# Patient Record
Sex: FEMALE | Race: BLACK OR AFRICAN AMERICAN | NOT HISPANIC OR LATINO | Employment: UNEMPLOYED | URBAN - METROPOLITAN AREA
[De-identification: names, ages, dates, MRNs, and addresses within clinical notes are randomized per-mention and may not be internally consistent; named-entity substitution may affect disease eponyms.]

---

## 2017-09-13 ENCOUNTER — HOSPITAL ENCOUNTER (OUTPATIENT)
Dept: RADIOLOGY | Facility: HOSPITAL | Age: 67
Discharge: HOME | End: 2017-09-13
Attending: PAIN MEDICINE
Payer: MEDICARE

## 2017-09-13 DIAGNOSIS — M17.9 OSTEOARTHRITIS, KNEE: ICD-10-CM

## 2017-09-13 DIAGNOSIS — M17.9 OSTEOARTHRITIS, KNEE: Primary | ICD-10-CM

## 2017-09-13 PROCEDURE — 73565 X-RAY EXAM OF KNEES: CPT | Mod: TC

## 2017-09-13 PROCEDURE — 73565 X-RAY EXAM OF KNEES: CPT | Mod: 26,,, | Performed by: RADIOLOGY

## 2017-09-20 DIAGNOSIS — M25.561 KNEE PAIN, RIGHT: Primary | ICD-10-CM

## 2017-09-26 ENCOUNTER — HOSPITAL ENCOUNTER (OUTPATIENT)
Dept: RADIOLOGY | Facility: HOSPITAL | Age: 67
Discharge: HOME | End: 2017-09-26
Attending: PAIN MEDICINE
Payer: MEDICARE

## 2017-09-26 DIAGNOSIS — M25.561 KNEE PAIN, RIGHT: ICD-10-CM

## 2017-09-26 PROCEDURE — 73721 MRI JNT OF LWR EXTRE W/O DYE: CPT | Mod: TC,RT

## 2017-09-26 PROCEDURE — 73721 MRI JNT OF LWR EXTRE W/O DYE: CPT | Mod: 26,RT,, | Performed by: RADIOLOGY

## 2018-03-01 ENCOUNTER — HOSPITAL ENCOUNTER (OUTPATIENT)
Dept: RADIOLOGY | Facility: HOSPITAL | Age: 68
Discharge: HOME | End: 2018-03-01
Attending: ORTHOPAEDIC SURGERY
Payer: MEDICARE

## 2018-03-01 ENCOUNTER — HOSPITAL ENCOUNTER (OUTPATIENT)
Dept: PREADMISSION TESTING | Facility: HOSPITAL | Age: 68
Discharge: HOME | End: 2018-03-01
Attending: ORTHOPAEDIC SURGERY
Payer: MEDICARE

## 2018-03-01 VITALS
BODY MASS INDEX: 38.15 KG/M2 | WEIGHT: 229 LBS | DIASTOLIC BLOOD PRESSURE: 73 MMHG | HEART RATE: 70 BPM | OXYGEN SATURATION: 96 % | SYSTOLIC BLOOD PRESSURE: 136 MMHG | HEIGHT: 65 IN | TEMPERATURE: 97 F | RESPIRATION RATE: 18 BRPM

## 2018-03-01 DIAGNOSIS — Z01.818 PRE-OP TESTING: Primary | ICD-10-CM

## 2018-03-01 LAB
ALBUMIN SERPL BCP-MCNC: 3.9 G/DL
ALP SERPL-CCNC: 80 U/L
ALT SERPL W/O P-5'-P-CCNC: 20 U/L
ANION GAP SERPL CALC-SCNC: 8 MMOL/L
APTT BLDCRRT: 29.9 SEC
AST SERPL-CCNC: 16 U/L
BASOPHILS # BLD AUTO: 0.02 K/UL
BASOPHILS NFR BLD: 0.3 %
BILIRUB SERPL-MCNC: 0.4 MG/DL
BILIRUB UR QL STRIP: NEGATIVE
BUN SERPL-MCNC: 14 MG/DL
CALCIUM SERPL-MCNC: 9.3 MG/DL
CHLORIDE SERPL-SCNC: 104 MMOL/L
CLARITY UR: CLEAR
CO2 SERPL-SCNC: 25 MMOL/L
COLOR UR: YELLOW
CREAT SERPL-MCNC: 0.7 MG/DL
DIFFERENTIAL METHOD: ABNORMAL
EOSINOPHIL # BLD AUTO: 0.3 K/UL
EOSINOPHIL NFR BLD: 4.7 %
ERYTHROCYTE [DISTWIDTH] IN BLOOD BY AUTOMATED COUNT: 15 %
EST. GFR  (AFRICAN AMERICAN): >60 ML/MIN/1.73 M^2
EST. GFR  (NON AFRICAN AMERICAN): >60 ML/MIN/1.73 M^2
ESTIMATED AVG GLUCOSE: 111 MG/DL
GLUCOSE SERPL-MCNC: 109 MG/DL
GLUCOSE UR QL STRIP: NEGATIVE
HBA1C MFR BLD HPLC: 5.5 %
HCT VFR BLD AUTO: 36.7 %
HGB BLD-MCNC: 12.5 G/DL
HGB UR QL STRIP: ABNORMAL
INR PPP: 1
KETONES UR QL STRIP: NEGATIVE
LEUKOCYTE ESTERASE UR QL STRIP: NEGATIVE
LYMPHOCYTES # BLD AUTO: 2.7 K/UL
LYMPHOCYTES NFR BLD: 37.1 %
MCH RBC QN AUTO: 28.6 PG
MCHC RBC AUTO-ENTMCNC: 34.1 G/DL
MCV RBC AUTO: 84 FL
MICROSCOPIC COMMENT: NORMAL
MONOCYTES # BLD AUTO: 0.5 K/UL
MONOCYTES NFR BLD: 6.7 %
NEUTROPHILS # BLD AUTO: 3.7 K/UL
NEUTROPHILS NFR BLD: 51.1 %
NITRITE UR QL STRIP: NEGATIVE
PH UR STRIP: 5 [PH] (ref 5–8)
PLATELET # BLD AUTO: 248 K/UL
PMV BLD AUTO: 11.1 FL
POTASSIUM SERPL-SCNC: 4.1 MMOL/L
PROT SERPL-MCNC: 7.7 G/DL
PROT UR QL STRIP: NEGATIVE
PROTHROMBIN TIME: 10.6 SEC
RBC # BLD AUTO: 4.37 M/UL
RBC #/AREA URNS HPF: 0 /HPF (ref 0–4)
SODIUM SERPL-SCNC: 137 MMOL/L
SP GR UR STRIP: 1.01 (ref 1–1.03)
SQUAMOUS #/AREA URNS HPF: NORMAL /HPF
URN SPEC COLLECT METH UR: ABNORMAL
UROBILINOGEN UR STRIP-ACNC: NEGATIVE EU/DL
WBC # BLD AUTO: 7.2 K/UL

## 2018-03-01 PROCEDURE — 93010 ELECTROCARDIOGRAM REPORT: CPT | Mod: ,,, | Performed by: INTERNAL MEDICINE

## 2018-03-01 PROCEDURE — 83036 HEMOGLOBIN GLYCOSYLATED A1C: CPT

## 2018-03-01 PROCEDURE — 93005 ELECTROCARDIOGRAM TRACING: CPT

## 2018-03-01 PROCEDURE — 81000 URINALYSIS NONAUTO W/SCOPE: CPT

## 2018-03-01 PROCEDURE — 85025 COMPLETE CBC W/AUTO DIFF WBC: CPT

## 2018-03-01 PROCEDURE — 80053 COMPREHEN METABOLIC PANEL: CPT

## 2018-03-01 PROCEDURE — 85730 THROMBOPLASTIN TIME PARTIAL: CPT

## 2018-03-01 PROCEDURE — 71046 X-RAY EXAM CHEST 2 VIEWS: CPT | Mod: 26,,, | Performed by: RADIOLOGY

## 2018-03-01 PROCEDURE — 85610 PROTHROMBIN TIME: CPT

## 2018-03-01 PROCEDURE — 36415 COLL VENOUS BLD VENIPUNCTURE: CPT

## 2018-03-01 PROCEDURE — 71046 X-RAY EXAM CHEST 2 VIEWS: CPT | Mod: TC,FY

## 2018-03-01 RX ORDER — ALBUTEROL SULFATE 90 UG/1
2 AEROSOL, METERED RESPIRATORY (INHALATION) EVERY 6 HOURS PRN
COMMUNITY

## 2018-03-01 RX ORDER — LEVOTHYROXINE SODIUM 50 UG/1
50 TABLET ORAL DAILY
COMMUNITY

## 2018-03-01 RX ORDER — ASPIRIN 81 MG/1
81 TABLET ORAL DAILY
Status: ON HOLD | COMMUNITY
End: 2018-03-14 | Stop reason: HOSPADM

## 2018-03-01 RX ORDER — MELOXICAM 15 MG/1
15 TABLET ORAL DAILY
Status: ON HOLD | COMMUNITY
End: 2018-03-14 | Stop reason: HOSPADM

## 2018-03-01 RX ORDER — FAMOTIDINE 40 MG/1
40 TABLET, FILM COATED ORAL NIGHTLY
COMMUNITY

## 2018-03-01 RX ORDER — OXYBUTYNIN CHLORIDE 10 MG/1
10 TABLET, EXTENDED RELEASE ORAL DAILY
COMMUNITY

## 2018-03-01 RX ORDER — QUETIAPINE FUMARATE 300 MG/1
300 TABLET, FILM COATED ORAL NIGHTLY
Status: ON HOLD | COMMUNITY
End: 2023-01-05 | Stop reason: HOSPADM

## 2018-03-01 RX ORDER — AMLODIPINE, VALSARTAN AND HYDROCHLOROTHIAZIDE 10; 160; 25 MG/1; MG/1; MG/1
1 TABLET ORAL DAILY
Status: ON HOLD | COMMUNITY
End: 2023-01-05 | Stop reason: HOSPADM

## 2018-03-01 RX ORDER — CARVEDILOL 12.5 MG/1
12.5 TABLET ORAL 2 TIMES DAILY WITH MEALS
Status: ON HOLD | COMMUNITY
End: 2023-01-05 | Stop reason: HOSPADM

## 2018-03-01 RX ORDER — METOCLOPRAMIDE 10 MG/1
10 TABLET ORAL
COMMUNITY

## 2018-03-01 RX ORDER — FLUOXETINE HYDROCHLORIDE 20 MG/1
20 CAPSULE ORAL DAILY
COMMUNITY

## 2018-03-01 RX ORDER — ATORVASTATIN CALCIUM 10 MG/1
10 TABLET, FILM COATED ORAL DAILY
Status: ON HOLD | COMMUNITY
End: 2023-01-05 | Stop reason: HOSPADM

## 2018-03-01 NOTE — DISCHARGE INSTRUCTIONS
Your surgery is scheduled for___3/12/2018______________.    Call [phone] between 2 pm and 5 pm __3/9/2018__________ to find out your arrival time for the day of surgery.    Report to SAME DAY SURGERY UNIT at _______am on the 2nd floor of the hospital.  Use the front entrance of the hospital before 6 am.  If you need wheelchair assistance, call [phone] from your cell phone,  or call 0 from the courtesy phone in the hospital lobby.    Important instructions:   Do not eat or drink after 12 midnight, including water.  It is okay to brush your teeth.  Do not have gum, candy or mints.     Take only these medications with a small swallow of water on the morning of your surgery.__inhalers, carvedilol, prozac, levothyroxine___________    Do not take any diabetic medication on the morning of surgery unless instructed to do so by your doctor or pre op nurse.    Stop taking Aspirin, Ibuprofen, Motrin and Aleve , Fish oil, and Vitamin E for at least 7 days before your surgery. You may use Tylenol unless otherwise instructed by your doctor.          Return to the hospital lab on __3/10/2018 or 3/11/2018________for additional blood test.         Please shower the night before and the morning of your surgery.        Use Hibiclens soap to your surgery site if instructed by your pre op nurse.   If your surgery is on your abdomen, be sure to wash your naval.  Be sure to rinse off Hibiclens after it is on your skin for several minutes.  Do not use Hibiclens on your face or genitals. Please place clean linens on your bed the night before surgery. Please wear fresh clean clothing after each shower.     No shaving of procedural area at least 4-5 days before surgery due to increased risk of skin irritation and/or possible infection.      You may be asked to take a third shower on arrival to Same Day Surgery depending on the type of surgery you are having.     Female patients may be asked for a urine specimen on the morning of  the surgery.  Please check with your nurse before using the restroom.     Do not wear make- up, including mascara.     You may wear deodorant only.      Do not wear powder, body lotion or cologne.     Do not wear any jewelry or have any metal on your body.     Please bring any documents given to you by your doctor.     If you are going home on the same day of surgery, you must have arrangements for a ride home.  You will not be able to drive home if you were given anesthesia or sedation.     Wear loose fitting clothes allowing for bandages.     Please leave money and valuables home.       You may bring your cell phone.     Call the doctor if fever or illness should occur before your surgery.    Call [phone] to contact us here at [facility] if needed.

## 2018-03-12 ENCOUNTER — HOSPITAL ENCOUNTER (INPATIENT)
Facility: HOSPITAL | Age: 68
LOS: 2 days | Discharge: HOME HEALTH | DRG: 470 | End: 2018-03-14
Attending: ORTHOPAEDIC SURGERY | Admitting: ORTHOPAEDIC SURGERY
Payer: MEDICARE

## 2018-03-12 ENCOUNTER — ANESTHESIA EVENT (OUTPATIENT)
Dept: SURGERY | Facility: HOSPITAL | Age: 68
DRG: 470 | End: 2018-03-12
Payer: MEDICARE

## 2018-03-12 ENCOUNTER — ANESTHESIA (OUTPATIENT)
Dept: SURGERY | Facility: HOSPITAL | Age: 68
DRG: 470 | End: 2018-03-12
Payer: MEDICARE

## 2018-03-12 DIAGNOSIS — M17.11 PRIMARY OSTEOARTHRITIS OF RIGHT KNEE: Primary | ICD-10-CM

## 2018-03-12 PROBLEM — J44.1 COPD EXACERBATION: Status: ACTIVE | Noted: 2018-03-12

## 2018-03-12 LAB
BACTERIA #/AREA URNS HPF: ABNORMAL /HPF
BASOPHILS # BLD AUTO: 0.02 K/UL
BASOPHILS NFR BLD: 0.2 %
BILIRUB UR QL STRIP: NEGATIVE
CLARITY UR: CLEAR
COLOR UR: YELLOW
DIFFERENTIAL METHOD: ABNORMAL
EOSINOPHIL # BLD AUTO: 0.2 K/UL
EOSINOPHIL NFR BLD: 1.5 %
ERYTHROCYTE [DISTWIDTH] IN BLOOD BY AUTOMATED COUNT: 14.9 %
GLUCOSE UR QL STRIP: NEGATIVE
GRAN CASTS #/AREA URNS LPF: 2 /LPF
HCT VFR BLD AUTO: 34.9 %
HGB BLD-MCNC: 11.9 G/DL
HGB UR QL STRIP: NEGATIVE
HYALINE CASTS #/AREA URNS LPF: 1 /LPF
KETONES UR QL STRIP: NEGATIVE
LEUKOCYTE ESTERASE UR QL STRIP: NEGATIVE
LYMPHOCYTES # BLD AUTO: 2.9 K/UL
LYMPHOCYTES NFR BLD: 28.7 %
MCH RBC QN AUTO: 29.4 PG
MCHC RBC AUTO-ENTMCNC: 34.1 G/DL
MCV RBC AUTO: 86 FL
MICROSCOPIC COMMENT: ABNORMAL
MONOCYTES # BLD AUTO: 1 K/UL
MONOCYTES NFR BLD: 9.7 %
NEUTROPHILS # BLD AUTO: 6 K/UL
NEUTROPHILS NFR BLD: 59.3 %
NITRITE UR QL STRIP: NEGATIVE
PH UR STRIP: 5 [PH] (ref 5–8)
PLATELET # BLD AUTO: 211 K/UL
PMV BLD AUTO: 11 FL
POCT GLUCOSE: 122 MG/DL (ref 70–110)
POCT GLUCOSE: 123 MG/DL (ref 70–110)
POCT GLUCOSE: 132 MG/DL (ref 70–110)
POCT GLUCOSE: 139 MG/DL (ref 70–110)
PROT UR QL STRIP: ABNORMAL
RBC # BLD AUTO: 4.05 M/UL
RBC #/AREA URNS HPF: 2 /HPF (ref 0–4)
SP GR UR STRIP: 1.03 (ref 1–1.03)
SQUAMOUS #/AREA URNS HPF: 2 /HPF
URN SPEC COLLECT METH UR: ABNORMAL
UROBILINOGEN UR STRIP-ACNC: NEGATIVE EU/DL
WBC # BLD AUTO: 10.16 K/UL
WBC #/AREA URNS HPF: 6 /HPF (ref 0–5)

## 2018-03-12 PROCEDURE — 36415 COLL VENOUS BLD VENIPUNCTURE: CPT

## 2018-03-12 PROCEDURE — 25000003 PHARM REV CODE 250: Performed by: NURSE ANESTHETIST, CERTIFIED REGISTERED

## 2018-03-12 PROCEDURE — 71000039 HC RECOVERY, EACH ADD'L HOUR: Performed by: ORTHOPAEDIC SURGERY

## 2018-03-12 PROCEDURE — 11000001 HC ACUTE MED/SURG PRIVATE ROOM

## 2018-03-12 PROCEDURE — 25000003 PHARM REV CODE 250: Performed by: ORTHOPAEDIC SURGERY

## 2018-03-12 PROCEDURE — 37000008 HC ANESTHESIA 1ST 15 MINUTES: Performed by: ORTHOPAEDIC SURGERY

## 2018-03-12 PROCEDURE — C1776 JOINT DEVICE (IMPLANTABLE): HCPCS | Performed by: ORTHOPAEDIC SURGERY

## 2018-03-12 PROCEDURE — 81000 URINALYSIS NONAUTO W/SCOPE: CPT

## 2018-03-12 PROCEDURE — 36000710: Performed by: ORTHOPAEDIC SURGERY

## 2018-03-12 PROCEDURE — S0020 INJECTION, BUPIVICAINE HYDRO: HCPCS | Performed by: ORTHOPAEDIC SURGERY

## 2018-03-12 PROCEDURE — 63600175 PHARM REV CODE 636 W HCPCS: Performed by: ORTHOPAEDIC SURGERY

## 2018-03-12 PROCEDURE — C9290 INJ, BUPIVACAINE LIPOSOME: HCPCS | Performed by: ORTHOPAEDIC SURGERY

## 2018-03-12 PROCEDURE — 87086 URINE CULTURE/COLONY COUNT: CPT

## 2018-03-12 PROCEDURE — 27201423 OPTIME MED/SURG SUP & DEVICES STERILE SUPPLY: Performed by: ORTHOPAEDIC SURGERY

## 2018-03-12 PROCEDURE — 25000003 PHARM REV CODE 250: Performed by: ANESTHESIOLOGY

## 2018-03-12 PROCEDURE — 27200688 HC TRAY, SPINAL-HYPER/ ISOBARIC: Performed by: NURSE ANESTHETIST, CERTIFIED REGISTERED

## 2018-03-12 PROCEDURE — C1713 ANCHOR/SCREW BN/BN,TIS/BN: HCPCS | Performed by: ORTHOPAEDIC SURGERY

## 2018-03-12 PROCEDURE — 36000711: Performed by: ORTHOPAEDIC SURGERY

## 2018-03-12 PROCEDURE — 0SRC0J9 REPLACEMENT OF RIGHT KNEE JOINT WITH SYNTHETIC SUBSTITUTE, CEMENTED, OPEN APPROACH: ICD-10-PCS | Performed by: ORTHOPAEDIC SURGERY

## 2018-03-12 PROCEDURE — D9220A PRA ANESTHESIA: Mod: CRNA,,, | Performed by: NURSE ANESTHETIST, CERTIFIED REGISTERED

## 2018-03-12 PROCEDURE — 85025 COMPLETE CBC W/AUTO DIFF WBC: CPT

## 2018-03-12 PROCEDURE — D9220A PRA ANESTHESIA: Mod: ANES,,, | Performed by: ANESTHESIOLOGY

## 2018-03-12 PROCEDURE — 63600175 PHARM REV CODE 636 W HCPCS: Performed by: NURSE ANESTHETIST, CERTIFIED REGISTERED

## 2018-03-12 PROCEDURE — G8978 MOBILITY CURRENT STATUS: HCPCS | Mod: CL

## 2018-03-12 PROCEDURE — G8979 MOBILITY GOAL STATUS: HCPCS | Mod: CJ

## 2018-03-12 PROCEDURE — 71000033 HC RECOVERY, INTIAL HOUR: Performed by: ORTHOPAEDIC SURGERY

## 2018-03-12 PROCEDURE — 97161 PT EVAL LOW COMPLEX 20 MIN: CPT

## 2018-03-12 PROCEDURE — 97530 THERAPEUTIC ACTIVITIES: CPT

## 2018-03-12 PROCEDURE — 37000009 HC ANESTHESIA EA ADD 15 MINS: Performed by: ORTHOPAEDIC SURGERY

## 2018-03-12 DEVICE — PATELLA OVAL DOME 32MM: Type: IMPLANTABLE DEVICE | Site: KNEE | Status: FUNCTIONAL

## 2018-03-12 DEVICE — TRAY TIBIAL CEMENT SZ 3 COBAL: Type: IMPLANTABLE DEVICE | Site: KNEE | Status: FUNCTIONAL

## 2018-03-12 DEVICE — COMPONENT FEM POST SZ 3 RIGHT: Type: IMPLANTABLE DEVICE | Site: KNEE | Status: FUNCTIONAL

## 2018-03-12 DEVICE — CEMENT BONE SMARTSET HV 40 GR.: Type: IMPLANTABLE DEVICE | Site: KNEE | Status: FUNCTIONAL

## 2018-03-12 DEVICE — IMPLANTABLE DEVICE: Type: IMPLANTABLE DEVICE | Site: KNEE | Status: FUNCTIONAL

## 2018-03-12 RX ORDER — MUPIROCIN 20 MG/G
1 OINTMENT TOPICAL 2 TIMES DAILY
Status: DISCONTINUED | OUTPATIENT
Start: 2018-03-12 | End: 2018-03-14 | Stop reason: HOSPADM

## 2018-03-12 RX ORDER — OXYBUTYNIN CHLORIDE 5 MG/1
10 TABLET, EXTENDED RELEASE ORAL DAILY
Status: DISCONTINUED | OUTPATIENT
Start: 2018-03-12 | End: 2018-03-14 | Stop reason: HOSPADM

## 2018-03-12 RX ORDER — DOCUSATE SODIUM 100 MG/1
100 CAPSULE, LIQUID FILLED ORAL EVERY 12 HOURS
Status: DISCONTINUED | OUTPATIENT
Start: 2018-03-12 | End: 2018-03-14 | Stop reason: HOSPADM

## 2018-03-12 RX ORDER — METOCLOPRAMIDE 10 MG/1
10 TABLET ORAL
Status: DISCONTINUED | OUTPATIENT
Start: 2018-03-12 | End: 2018-03-14 | Stop reason: HOSPADM

## 2018-03-12 RX ORDER — FENTANYL CITRATE 50 UG/ML
INJECTION, SOLUTION INTRAMUSCULAR; INTRAVENOUS
Status: DISCONTINUED | OUTPATIENT
Start: 2018-03-12 | End: 2018-03-12

## 2018-03-12 RX ORDER — LEVOTHYROXINE SODIUM 50 UG/1
50 TABLET ORAL DAILY
Status: DISCONTINUED | OUTPATIENT
Start: 2018-03-12 | End: 2018-03-14 | Stop reason: HOSPADM

## 2018-03-12 RX ORDER — FLUOXETINE HYDROCHLORIDE 20 MG/1
20 CAPSULE ORAL DAILY
Status: DISCONTINUED | OUTPATIENT
Start: 2018-03-12 | End: 2018-03-14 | Stop reason: HOSPADM

## 2018-03-12 RX ORDER — HYDROCHLOROTHIAZIDE 25 MG/1
25 TABLET ORAL DAILY
Status: DISCONTINUED | OUTPATIENT
Start: 2018-03-12 | End: 2018-03-14 | Stop reason: HOSPADM

## 2018-03-12 RX ORDER — QUETIAPINE FUMARATE 100 MG/1
300 TABLET, FILM COATED ORAL NIGHTLY
Status: DISCONTINUED | OUTPATIENT
Start: 2018-03-12 | End: 2018-03-14 | Stop reason: HOSPADM

## 2018-03-12 RX ORDER — MORPHINE SULFATE 4 MG/ML
2 INJECTION, SOLUTION INTRAMUSCULAR; INTRAVENOUS
Status: DISCONTINUED | OUTPATIENT
Start: 2018-03-12 | End: 2018-03-14 | Stop reason: HOSPADM

## 2018-03-12 RX ORDER — ASPIRIN 81 MG/1
81 TABLET ORAL 2 TIMES DAILY
Status: DISCONTINUED | OUTPATIENT
Start: 2018-03-12 | End: 2018-03-14 | Stop reason: HOSPADM

## 2018-03-12 RX ORDER — CEFUROXIME SODIUM 750 MG/1
INJECTION, POWDER, FOR SOLUTION INTRAMUSCULAR; INTRAVENOUS
Status: DISCONTINUED | OUTPATIENT
Start: 2018-03-12 | End: 2018-03-12 | Stop reason: HOSPADM

## 2018-03-12 RX ORDER — SODIUM CHLORIDE 0.9 % (FLUSH) 0.9 %
5 SYRINGE (ML) INJECTION
Status: DISCONTINUED | OUTPATIENT
Start: 2018-03-12 | End: 2018-03-14 | Stop reason: HOSPADM

## 2018-03-12 RX ORDER — CARVEDILOL 6.25 MG/1
12.5 TABLET ORAL ONCE
Status: COMPLETED | OUTPATIENT
Start: 2018-03-12 | End: 2018-03-12

## 2018-03-12 RX ORDER — FAMOTIDINE 20 MG/1
40 TABLET, FILM COATED ORAL NIGHTLY
Status: DISCONTINUED | OUTPATIENT
Start: 2018-03-12 | End: 2018-03-14 | Stop reason: HOSPADM

## 2018-03-12 RX ORDER — HYDROMORPHONE HYDROCHLORIDE 2 MG/ML
0.2 INJECTION, SOLUTION INTRAMUSCULAR; INTRAVENOUS; SUBCUTANEOUS EVERY 5 MIN PRN
Status: DISCONTINUED | OUTPATIENT
Start: 2018-03-12 | End: 2018-03-12 | Stop reason: HOSPADM

## 2018-03-12 RX ORDER — ACETAMINOPHEN 10 MG/ML
1000 INJECTION, SOLUTION INTRAVENOUS EVERY 8 HOURS
Status: DISCONTINUED | OUTPATIENT
Start: 2018-03-12 | End: 2018-03-12

## 2018-03-12 RX ORDER — AMLODIPINE, VALSARTAN AND HYDROCHLOROTHIAZIDE 10; 160; 25 MG/1; MG/1; MG/1
1 TABLET ORAL DAILY
Status: DISCONTINUED | OUTPATIENT
Start: 2018-03-12 | End: 2018-03-12

## 2018-03-12 RX ORDER — BUPIVACAINE HYDROCHLORIDE 2.5 MG/ML
INJECTION, SOLUTION INFILTRATION; PERINEURAL
Status: DISCONTINUED | OUTPATIENT
Start: 2018-03-12 | End: 2018-03-12 | Stop reason: HOSPADM

## 2018-03-12 RX ORDER — CEFAZOLIN SODIUM 1 G/3ML
INJECTION, POWDER, FOR SOLUTION INTRAMUSCULAR; INTRAVENOUS
Status: DISCONTINUED | OUTPATIENT
Start: 2018-03-12 | End: 2018-03-12

## 2018-03-12 RX ORDER — METOCLOPRAMIDE HYDROCHLORIDE 5 MG/ML
INJECTION INTRAMUSCULAR; INTRAVENOUS
Status: DISCONTINUED | OUTPATIENT
Start: 2018-03-12 | End: 2018-03-12

## 2018-03-12 RX ORDER — MIDAZOLAM HYDROCHLORIDE 1 MG/ML
INJECTION, SOLUTION INTRAMUSCULAR; INTRAVENOUS
Status: DISCONTINUED | OUTPATIENT
Start: 2018-03-12 | End: 2018-03-12

## 2018-03-12 RX ORDER — AMLODIPINE BESYLATE 5 MG/1
10 TABLET ORAL DAILY
Status: DISCONTINUED | OUTPATIENT
Start: 2018-03-12 | End: 2018-03-14 | Stop reason: HOSPADM

## 2018-03-12 RX ORDER — PROPOFOL 10 MG/ML
VIAL (ML) INTRAVENOUS CONTINUOUS PRN
Status: DISCONTINUED | OUTPATIENT
Start: 2018-03-12 | End: 2018-03-12

## 2018-03-12 RX ORDER — METHYLPREDNISOLONE ACETATE 40 MG/ML
INJECTION, SUSPENSION INTRA-ARTICULAR; INTRALESIONAL; INTRAMUSCULAR; SOFT TISSUE
Status: DISCONTINUED | OUTPATIENT
Start: 2018-03-12 | End: 2018-03-12 | Stop reason: HOSPADM

## 2018-03-12 RX ORDER — GENTAMICIN SULFATE 40 MG/ML
INJECTION, SOLUTION INTRAMUSCULAR; INTRAVENOUS
Status: DISCONTINUED | OUTPATIENT
Start: 2018-03-12 | End: 2018-03-12 | Stop reason: HOSPADM

## 2018-03-12 RX ORDER — CEFAZOLIN SODIUM 2 G/50ML
2 SOLUTION INTRAVENOUS
Status: COMPLETED | OUTPATIENT
Start: 2018-03-12 | End: 2018-03-13

## 2018-03-12 RX ORDER — SODIUM CHLORIDE, SODIUM LACTATE, POTASSIUM CHLORIDE, CALCIUM CHLORIDE 600; 310; 30; 20 MG/100ML; MG/100ML; MG/100ML; MG/100ML
INJECTION, SOLUTION INTRAVENOUS CONTINUOUS
Status: DISCONTINUED | OUTPATIENT
Start: 2018-03-12 | End: 2018-03-13

## 2018-03-12 RX ORDER — CARVEDILOL 6.25 MG/1
12.5 TABLET ORAL 2 TIMES DAILY WITH MEALS
Status: DISCONTINUED | OUTPATIENT
Start: 2018-03-12 | End: 2018-03-14 | Stop reason: HOSPADM

## 2018-03-12 RX ORDER — OXYCODONE HYDROCHLORIDE 5 MG/1
10 TABLET ORAL EVERY 4 HOURS PRN
Status: DISCONTINUED | OUTPATIENT
Start: 2018-03-12 | End: 2018-03-14 | Stop reason: HOSPADM

## 2018-03-12 RX ORDER — ATORVASTATIN CALCIUM 10 MG/1
10 TABLET, FILM COATED ORAL DAILY
Status: DISCONTINUED | OUTPATIENT
Start: 2018-03-12 | End: 2018-03-14 | Stop reason: HOSPADM

## 2018-03-12 RX ORDER — TRANEXAMIC ACID 100 MG/ML
2000 INJECTION, SOLUTION INTRAVENOUS ONCE
Status: DISCONTINUED | OUTPATIENT
Start: 2018-03-12 | End: 2018-03-12 | Stop reason: CLARIF

## 2018-03-12 RX ORDER — METOCLOPRAMIDE HYDROCHLORIDE 5 MG/ML
10 INJECTION INTRAMUSCULAR; INTRAVENOUS EVERY 10 MIN PRN
Status: DISCONTINUED | OUTPATIENT
Start: 2018-03-12 | End: 2018-03-12 | Stop reason: HOSPADM

## 2018-03-12 RX ORDER — ONDANSETRON 2 MG/ML
4 INJECTION INTRAMUSCULAR; INTRAVENOUS EVERY 12 HOURS PRN
Status: DISCONTINUED | OUTPATIENT
Start: 2018-03-12 | End: 2018-03-14 | Stop reason: HOSPADM

## 2018-03-12 RX ORDER — SODIUM CHLORIDE, SODIUM LACTATE, POTASSIUM CHLORIDE, CALCIUM CHLORIDE 600; 310; 30; 20 MG/100ML; MG/100ML; MG/100ML; MG/100ML
INJECTION, SOLUTION INTRAVENOUS CONTINUOUS PRN
Status: DISCONTINUED | OUTPATIENT
Start: 2018-03-12 | End: 2018-03-12

## 2018-03-12 RX ORDER — MEPERIDINE HYDROCHLORIDE 50 MG/ML
12.5 INJECTION INTRAMUSCULAR; INTRAVENOUS; SUBCUTANEOUS ONCE AS NEEDED
Status: DISCONTINUED | OUTPATIENT
Start: 2018-03-12 | End: 2018-03-12 | Stop reason: HOSPADM

## 2018-03-12 RX ORDER — ALBUTEROL SULFATE 90 UG/1
2 AEROSOL, METERED RESPIRATORY (INHALATION) EVERY 6 HOURS PRN
Status: DISCONTINUED | OUTPATIENT
Start: 2018-03-12 | End: 2018-03-14 | Stop reason: HOSPADM

## 2018-03-12 RX ORDER — GABAPENTIN 400 MG/1
1200 CAPSULE ORAL
Status: COMPLETED | OUTPATIENT
Start: 2018-03-12 | End: 2018-03-12

## 2018-03-12 RX ORDER — LIDOCAINE HCL/PF 100 MG/5ML
SYRINGE (ML) INTRAVENOUS
Status: DISCONTINUED | OUTPATIENT
Start: 2018-03-12 | End: 2018-03-12

## 2018-03-12 RX ORDER — VALSARTAN 80 MG/1
160 TABLET ORAL DAILY
Status: DISCONTINUED | OUTPATIENT
Start: 2018-03-12 | End: 2018-03-14 | Stop reason: HOSPADM

## 2018-03-12 RX ORDER — OXYCODONE AND ACETAMINOPHEN 5; 325 MG/1; MG/1
1 TABLET ORAL
Status: DISCONTINUED | OUTPATIENT
Start: 2018-03-12 | End: 2018-03-12 | Stop reason: HOSPADM

## 2018-03-12 RX ORDER — METOPROLOL TARTRATE 1 MG/ML
5 INJECTION, SOLUTION INTRAVENOUS ONCE
Status: COMPLETED | OUTPATIENT
Start: 2018-03-12 | End: 2018-03-12

## 2018-03-12 RX ORDER — ACETAMINOPHEN 10 MG/ML
1000 INJECTION, SOLUTION INTRAVENOUS EVERY 8 HOURS
Status: COMPLETED | OUTPATIENT
Start: 2018-03-12 | End: 2018-03-13

## 2018-03-12 RX ORDER — CELECOXIB 100 MG/1
400 CAPSULE ORAL
Status: COMPLETED | OUTPATIENT
Start: 2018-03-12 | End: 2018-03-12

## 2018-03-12 RX ORDER — BISACODYL 10 MG
10 SUPPOSITORY, RECTAL RECTAL DAILY PRN
Status: DISCONTINUED | OUTPATIENT
Start: 2018-03-12 | End: 2018-03-14 | Stop reason: HOSPADM

## 2018-03-12 RX ORDER — SODIUM CHLORIDE 0.9 % (FLUSH) 0.9 %
3 SYRINGE (ML) INJECTION
Status: DISCONTINUED | OUTPATIENT
Start: 2018-03-12 | End: 2018-03-14 | Stop reason: HOSPADM

## 2018-03-12 RX ORDER — GLYCOPYRROLATE 0.2 MG/ML
INJECTION INTRAMUSCULAR; INTRAVENOUS
Status: DISCONTINUED | OUTPATIENT
Start: 2018-03-12 | End: 2018-03-12

## 2018-03-12 RX ORDER — ZOLPIDEM TARTRATE 5 MG/1
5 TABLET ORAL NIGHTLY PRN
Status: DISCONTINUED | OUTPATIENT
Start: 2018-03-12 | End: 2018-03-14 | Stop reason: HOSPADM

## 2018-03-12 RX ADMIN — FENTANYL CITRATE 25 MCG: 50 INJECTION INTRAMUSCULAR; INTRAVENOUS at 07:03

## 2018-03-12 RX ADMIN — METOCLOPRAMIDE 10 MG: 10 TABLET ORAL at 12:03

## 2018-03-12 RX ADMIN — CELECOXIB 400 MG: 100 CAPSULE ORAL at 06:03

## 2018-03-12 RX ADMIN — ASPIRIN 81 MG: 81 TABLET, COATED ORAL at 03:03

## 2018-03-12 RX ADMIN — GLYCOPYRROLATE 0.2 MG: 0.2 INJECTION, SOLUTION INTRAMUSCULAR; INTRAVENOUS at 07:03

## 2018-03-12 RX ADMIN — DOCUSATE SODIUM 100 MG: 100 CAPSULE, LIQUID FILLED ORAL at 08:03

## 2018-03-12 RX ADMIN — HYDROCHLOROTHIAZIDE 25 MG: 25 TABLET ORAL at 12:03

## 2018-03-12 RX ADMIN — TRANEXAMIC ACID 2 G: 100 INJECTION, SOLUTION INTRAVENOUS at 08:03

## 2018-03-12 RX ADMIN — LEVOTHYROXINE SODIUM 50 MCG: 50 TABLET ORAL at 12:03

## 2018-03-12 RX ADMIN — AMLODIPINE BESYLATE 10 MG: 5 TABLET ORAL at 12:03

## 2018-03-12 RX ADMIN — METOCLOPRAMIDE 10 MG: 10 TABLET ORAL at 05:03

## 2018-03-12 RX ADMIN — CEFAZOLIN SODIUM 2 G: 2 SOLUTION INTRAVENOUS at 06:03

## 2018-03-12 RX ADMIN — QUETIAPINE FUMARATE 300 MG: 100 TABLET ORAL at 08:03

## 2018-03-12 RX ADMIN — MUPIROCIN 1 G: 20 OINTMENT TOPICAL at 12:03

## 2018-03-12 RX ADMIN — OXYCODONE HYDROCHLORIDE 10 MG: 5 TABLET ORAL at 03:03

## 2018-03-12 RX ADMIN — CARVEDILOL 12.5 MG: 6.25 TABLET, FILM COATED ORAL at 06:03

## 2018-03-12 RX ADMIN — VALSARTAN 160 MG: 80 TABLET, FILM COATED ORAL at 12:03

## 2018-03-12 RX ADMIN — FENTANYL CITRATE 25 MCG: 50 INJECTION INTRAMUSCULAR; INTRAVENOUS at 08:03

## 2018-03-12 RX ADMIN — SODIUM CHLORIDE, SODIUM LACTATE, POTASSIUM CHLORIDE, AND CALCIUM CHLORIDE: .6; .31; .03; .02 INJECTION, SOLUTION INTRAVENOUS at 03:03

## 2018-03-12 RX ADMIN — PROPOFOL 25 MCG/KG/MIN: 10 INJECTION, EMULSION INTRAVENOUS at 07:03

## 2018-03-12 RX ADMIN — SODIUM CHLORIDE, SODIUM LACTATE, POTASSIUM CHLORIDE, AND CALCIUM CHLORIDE: .6; .31; .03; .02 INJECTION, SOLUTION INTRAVENOUS at 09:03

## 2018-03-12 RX ADMIN — FAMOTIDINE 40 MG: 20 TABLET, FILM COATED ORAL at 08:03

## 2018-03-12 RX ADMIN — MIDAZOLAM HYDROCHLORIDE 2 MG: 1 INJECTION, SOLUTION INTRAMUSCULAR; INTRAVENOUS at 07:03

## 2018-03-12 RX ADMIN — OXYBUTYNIN CHLORIDE 10 MG: 5 TABLET, EXTENDED RELEASE ORAL at 12:03

## 2018-03-12 RX ADMIN — LIDOCAINE HYDROCHLORIDE 40 MG: 20 INJECTION, SOLUTION INTRAVENOUS at 07:03

## 2018-03-12 RX ADMIN — METOCLOPRAMIDE 10 MG: 5 INJECTION, SOLUTION INTRAMUSCULAR; INTRAVENOUS at 07:03

## 2018-03-12 RX ADMIN — ACETAMINOPHEN 1000 MG: 10 INJECTION, SOLUTION INTRAVENOUS at 10:03

## 2018-03-12 RX ADMIN — METOPROLOL TARTRATE 5 MG: 5 INJECTION, SOLUTION INTRAVENOUS at 10:03

## 2018-03-12 RX ADMIN — FLUOXETINE 20 MG: 20 CAPSULE ORAL at 12:03

## 2018-03-12 RX ADMIN — ATORVASTATIN CALCIUM 10 MG: 10 TABLET, FILM COATED ORAL at 12:03

## 2018-03-12 RX ADMIN — GABAPENTIN 1200 MG: 400 CAPSULE ORAL at 06:03

## 2018-03-12 RX ADMIN — MUPIROCIN 1 G: 20 OINTMENT TOPICAL at 08:03

## 2018-03-12 RX ADMIN — MORPHINE SULFATE 2 MG: 4 INJECTION INTRAVENOUS at 12:03

## 2018-03-12 RX ADMIN — SODIUM CHLORIDE, SODIUM LACTATE, POTASSIUM CHLORIDE, AND CALCIUM CHLORIDE: .6; .31; .03; .02 INJECTION, SOLUTION INTRAVENOUS at 07:03

## 2018-03-12 RX ADMIN — DOCUSATE SODIUM 100 MG: 100 CAPSULE, LIQUID FILLED ORAL at 12:03

## 2018-03-12 RX ADMIN — CARVEDILOL 12.5 MG: 6.25 TABLET, FILM COATED ORAL at 05:03

## 2018-03-12 RX ADMIN — CEFAZOLIN SODIUM 3 G: 1 POWDER, FOR SOLUTION INTRAMUSCULAR; INTRAVENOUS at 07:03

## 2018-03-12 RX ADMIN — OXYCODONE HYDROCHLORIDE 10 MG: 5 TABLET ORAL at 08:03

## 2018-03-12 RX ADMIN — CEFAZOLIN SODIUM 2 G: 2 SOLUTION INTRAVENOUS at 12:03

## 2018-03-12 NOTE — NURSING
Received patient to room 437 via bed after Total Right Knee replacement. Awake upon arrival. Alert and oriented. Oriented to new surrounding. Verbalizes understanding.call light within reach. Instructed to call as needed,

## 2018-03-12 NOTE — PROGRESS NOTES
Physical Therapy   CPM placement     Nicolás Khan   MRN: [id number]     PT orders received for CPM placement s/p R TKA. Patient sleeping with no complaint of pain. CPM placed to right knee and set at 0-80 degrees. CPM placed at 10:30 and nurse notified to remove at 12:30. PT will continue to follow patient.     Nia Rios, PT

## 2018-03-12 NOTE — BRIEF OP NOTE
Operative Note         SUMMARY     Surgery Date: 3/12/2018     Surgeon(s) and Role:     * Yao Queen MD - Primary    Pre-op Diagnosis:  Primary osteoarthritis of right knee [M17.11]    Post-op Diagnosis: same    Procedure(s) (LRB):  ARTHROPLASTY-KNEE (Right)    Anesthesia: Spinal    Findings/Key Components:        Specimen:    No specimen to Pathology orders placed during procedure(s).     Specimen (12h ago through future)    None            Estimated Blood Loss: * No values recorded between 3/12/2018  7:57 AM and 3/12/2018  9:16 AM *

## 2018-03-12 NOTE — ANESTHESIA POSTPROCEDURE EVALUATION
"Anesthesia Post Evaluation    Patient: Nicolás Khan    Procedure(s) Performed: Procedure(s) (LRB):  ARTHROPLASTY-KNEE (Right)    Final Anesthesia Type: spinal  Patient location during evaluation: PACU  Patient participation: Yes- Able to Participate  Level of consciousness: awake and alert and oriented  Post-procedure vital signs: reviewed and stable  Pain management: adequate  Airway patency: patent  PONV status at discharge: No PONV  Anesthetic complications: no      Cardiovascular status: blood pressure returned to baseline and hemodynamically stable  Respiratory status: unassisted, spontaneous ventilation and room air  Hydration status: euvolemic  Follow-up not needed.        Visit Vitals  BP (!) 179/93 (BP Location: Right arm)   Pulse 80   Temp 36.5 °C (97.7 °F) (Oral)   Resp 17   Ht 5' 5" (1.651 m)   Wt 103.9 kg (229 lb)   SpO2 95%   Breastfeeding? No   BMI 38.11 kg/m²       Pain/Sonya Score: Pain Assessment Performed: Yes (3/12/2018  9:17 AM)  Presence of Pain: denies (3/12/2018  9:17 AM)  Pain Rating Prior to Med Admin: 0 (3/12/2018 10:48 AM)  Sonya Score: 7 (3/12/2018  9:17 AM)      "

## 2018-03-12 NOTE — PT/OT/SLP EVAL
Physical Therapy Evaluation    Patient Name:  Nicolás Khan   MRN:  [id number]    Recommendations:     Discharge Recommendations:  home health PT   Discharge Equipment Recommendations: bedside commode, walker, rolling   Barriers to discharge: unable to stand or ambulate today due to pain in right knee    Assessment:     Nicolás Khan is a 67 y.o. female admitted with a medical diagnosis of Primary osteoarthritis of right knee.  She presents with the following impairments/functional limitations:  weakness, impaired functional mobilty, gait instability, impaired balance, pain, impaired skin, decreased ROM, edema, impaired joint extensibility, orthopedic precautions, decreased lower extremity function, decreased safety awareness.    Rehab Prognosis:  good; patient would benefit from acute skilled PT services to address these deficits and reach maximum level of function.      Recent Surgery: Procedure(s) (LRB):  ARTHROPLASTY-KNEE (Right) Day of Surgery    Plan:     During this hospitalization, patient to be seen BID to address the above listed problems via gait training, therapeutic activities, therapeutic exercises  · Plan of Care Expires:  03/19/18   Plan of Care Reviewed with: patient    Subjective     Communicated with nurse Russo prior to session.  Patient found supine in bed upon PT entry to room, agreeable to evaluation.      Chief Complaint: Right knee pain.   Patient comments/goals: To walk.   Pain/Comfort:  · Pain Rating 1: 7/10  · Location - Side 1: Right  · Location 1: knee  · Pain Addressed 1: Pre-medicate for activity, Distraction, Cessation of Activity, Nurse notified  · Pain Rating Post-Intervention 1: 10/10    Living Environment:  Patient lives at home with her son and her sister. Her sister is in a wheelchair but her son can assist her. There are 2 steps in the front of the house with no handrail, 2 steps in the back of the house with 2 handrails, and a ramp that her sister uses. Prior to admission,  patients level of function was independent. Patient has the following equipment:  (sister has a shower chair in the walk in shower). Upon discharge, patient will have assistance from son.    Objective:     Patient found with: peripheral IV, oxygen, SCD     General Precautions: Standard, fall   Orthopedic Precautions:RLE weight bearing as tolerated   Braces: N/A     Exams:  · Cognitive Exam:  Patient is oriented to Person, Place, Time and Situation and follows 100% of two step commands   · Gross Motor Coordination:  WFL  · Postural Exam:  Patient presented with the following abnormalities:    · -       Rounded shoulders  · -       Forward head  · Sensation:    · -       Intact  · Skin Integrity/Edema:      · -       Skin integrity: Visible skin intact with surgical dressing intact  · RLE ROM: WFL except limited at knee due to pain  · RLE Strength: WFL except limited at knee due to pain  · LLE ROM: WFL  · LLE Strength: 4+/5    Functional Mobility:  · Bed Mobility:     · Scooting: stand by assistance along edge of bed while seated in upright position  · Supine to Sit: minimum assistance  · Sit to Supine: minimum assistance  · Transfers:     · Sit to Stand:  moderate assistance with rolling walker x2 trials with patient unable to tolerate standing due to right knee pain    AM-PAC 6 CLICK MOBILITY  Total Score:11     Patient left supine with all lines intact, call button in reach, bed alarm on and nurse Rafaela notified.    GOALS:    Physical Therapy Goals        Problem: Physical Therapy Goal    Goal Priority Disciplines Outcome Goal Variances Interventions   Physical Therapy Goal     PT/OT, PT      Description:  Goals to be met by: 3/19/18    Patient will increase functional independence with mobility by performin. Supine to sit with Stand-by Assistance  2. Sit to stand transfer with Stand-by Assistance  3. Gait assess when able  4. Lower extremity exercise program x30 reps per handout, with supervision                       History:     Past Medical History:   Diagnosis Date    COPD (chronic obstructive pulmonary disease)     Diabetes mellitus     GERD (gastroesophageal reflux disease)     Hyperlipemia, mixed     Hypertension     Major depression     Obesity     Osteoarthritis     Thyroid disease     Tobacco abuse     Urinary incontinence, functional     Vitamin D deficiency        Past Surgical History:   Procedure Laterality Date    HYSTERECTOMY       Time Tracking:     PT Received On: 03/12/18  PT Start Time: 1619     PT Stop Time: 1635  PT Total Time (min): 16 min     Billable Minutes: Evaluation  16     Nia Rios, PT  03/12/2018

## 2018-03-12 NOTE — ANESTHESIA PROCEDURE NOTES
Spinal    Diagnosis: right knee arthropathy  Patient location during procedure: holding area  Start time: 3/12/2018 7:17 AM  Timeout: 3/12/2018 7:17 AM  End time: 3/12/2018 7:18 AM  Staffing  Anesthesiologist: NEENA YOUNGBLOOD  Performed: anesthesiologist   Preanesthetic Checklist  Completed: patient identified, site marked, surgical consent, pre-op evaluation, timeout performed, IV checked, risks and benefits discussed and monitors and equipment checked  Spinal Block  Patient position: sitting  Prep: ChloraPrep  Patient monitoring: heart rate, cardiac monitor and continuous pulse ox  Approach: midline  Location: L4-5  Injection technique: single shot  CSF Fluid: clear free-flowing CSF  Needle  Needle type: pencil-tip   Needle gauge: 25 G  Needle length: 3.5 in  Additional Documentation: no paresthesia on injection and incremental injection  Needle localization: anatomical landmarks  Assessment  Ease of block: easy  Patient's tolerance of the procedure: comfortable throughout block  Medications:  Bolus administered: 2.6 mL of 0.5 bupivacaine

## 2018-03-12 NOTE — PLAN OF CARE
Problem: Physical Therapy Goal  Goal: Physical Therapy Goal  Goals to be met by: 3/19/18    Patient will increase functional independence with mobility by performin. Supine to sit with Stand-by Assistance  2. Sit to stand transfer with Stand-by Assistance  3. Gait assess when able  4. Lower extremity exercise program x30 reps per handout, with supervision      Patient would continue to benefit from PT while in the hospital.

## 2018-03-12 NOTE — OP NOTE
DATE OF PROCEDURE:  03/12/2018    SURGEON:  Yao Queen M.D.    ASSISTANT:  YASH Crawford PA-C.    PREOPERATIVE DIAGNOSES:  Osteoarthritis, right knee, morbid obesity, chronic   obstructive pulmonary disease.    POSTOPERATIVE DIAGNOSES:  Osteoarthritis, right knee, morbid obesity, chronic   obstructive pulmonary disease.    PROCEDURES:  Right total knee replacement with DePuy sigma cruciate substituting   cemented prosthesis size 3 femur, size 3 tibia, 32 mm patellar button.    ANESTHESIA:  General.    COMPLICATIONS:  None.    BLOOD LOSS:  150 mL.    BLOOD GIVEN:  None.    INDICATIONS FOR SURGERY:  The patient had a dry scaly skin lesion on the lateral   aspect of the calf approximately 5 inches from 1the incision.  She reports her   dermatologist, Dr. Jiang has treated this and after 2 months ago she no longer   needs any retreatment or medication for the skin lesion.  She is not sure what   it is.  It appears to be somewhat eczema.  There is no sign of infection.     PROCEDURE IN DETAIL:  Following obtaining proper informed consent from the   patient, she was taken to Operating Room and per endotracheal tube.  The patient   was given IV antibiotics and tranexamic acid.  The patient's right leg was   prepped and draped in the usual sterile fashion.  Tourniquet inflated to 300   mmHg during the case.  The tourniquet was inflated for 40 minutes during the   case.  After prepping and draping, the incision was made approximately 5 inches   from the dry scaly eczema type lesion on the calf.  A medial parapatellar   arthrotomy was performed.  The patient had a great deal of synovitis, partial   synovectomy was performed.  Osteophytes were removed.  Medial and lateral   meniscus were excised.  The ACL was removed.  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    Retractors were  placed on the proximal tibia to protect soft tissue.  An   extramedullary tibial guide was used to cut the tibia perpendicular to the shaft   of the tibia.    I used to enter the intramedullary canal of the femur.  A long intramedullary   guidewire was used.  The distal cutting block set at 5 degrees valgus and 11 mm   distal resection.  Distal cut was made.  PCL was assessed and it was elected to   perform a cruciate substituting knee due to the tibial eminence nonunion.  The   box cut was made.  The trials were placed and after ligament balancing, a 10 mm   posterior-stabilized insert provided excellent stability and full range of   motion with good stability.  The patella was arthritic and was resurfaced with   32 mm, all polyethylene, 3-hole button patella.    All 3 components were cemented in place using third generation cement technique.    Tourniquet was released and hemostasis was obtained using electrocautery.    Capsule was injected with Marcaine, Duramorph, Depo-Medrol, and Exparel.  The   wound was irrigated again and closed with #1 interrupted Ethibond suture to   deep, and #1 running Vicryl suture deep and 2-0 Vicryl sutures subcutaneous with   staples to the skin.  Sterile dressing was applied and the patient was taken to   the Recovery Room in stable condition.      SURINDER/IN  dd: 03/12/2018 09:20:10 (CDLUIS)  td: 03/12/2018 13:47:03 (ISAAC)  Doc ID   #[id number]  Job ID #[id number]    CC:

## 2018-03-12 NOTE — ANESTHESIA PREPROCEDURE EVALUATION
03/12/2018  Nicolás Khan is a 67 y.o., female.    Anesthesia Evaluation    I have reviewed the Patient Summary Reports.     I have reviewed the Medications.     Review of Systems  Anesthesia Hx:  No problems with previous Anesthesia    Social:  Smoker, Alcohol Use    Cardiovascular:   Hypertension hyperlipidemia    Pulmonary:   COPD    Hepatic/GI:   GERD    Musculoskeletal:   Arthritis     Endocrine:   Diabetes Hypothyroidism    Psych:   Psychiatric History          Physical Exam  General:  Well nourished, Obesity    Airway/Jaw/Neck:  Airway Findings: Mouth Opening: Normal Tongue: Normal  General Airway Assessment: Adult  Mallampati: II  TM Distance: Normal, at least 6 cm  Jaw/Neck Findings:  Neck ROM: Normal ROM      Dental:  Dental Findings: In tact   Chest/Lungs:  Chest/Lungs Findings: Clear to auscultation, Normal Respiratory Rate     Heart/Vascular:  Heart Findings: Rate: Normal        Mental Status:  Mental Status Findings:  Cooperative, Alert and Oriented         Anesthesia Plan  Type of Anesthesia, risks & benefits discussed:  Anesthesia Type:  spinal  Patient's Preference:   Intra-op Monitoring Plan: standard ASA monitors  Intra-op Monitoring Plan Comments:   Post Op Pain Control Plan: multimodal analgesia, IV/PO Opioids PRN and per primary service following discharge from PACU  Post Op Pain Control Plan Comments:   Induction:   IV  Beta Blocker:  Patient is on a Beta-Blocker and has received one dose within the past 24 hours (No further documentation required).       Informed Consent: Patient understands risks and agrees with Anesthesia plan.  Questions answered. Anesthesia consent signed with patient.  ASA Score: 3     Day of Surgery Review of History & Physical:    H&P update referred to the surgeon.         Ready For Surgery From Anesthesia Perspective.

## 2018-03-12 NOTE — H&P
No changes to H&P related to this admission.    Pt has had progressively worsening right knee pain which now limits the patients ability to walk or stand for extended periods. The pain is limiting stair climbing and interfering with normal activities of daily living.  The pain has been non-responsive to NSAID therapy. The patients functional level has not improved with an exercise program designed to strengthen the LE muscles and improve ambulation.  Radiographs show arthritis in the knee joint with significant loss of cartilage joint space and development of osteophytes and subchondral cysts.  The patient understands risks associated with joint replacement after all questions have been answered.

## 2018-03-12 NOTE — TRANSFER OF CARE
"Anesthesia Transfer of Care Note    Patient: Nicolás Khan    Procedure(s) Performed: Procedure(s) (LRB):  ARTHROPLASTY-KNEE (Right)    Patient location: PACU    Anesthesia Type: spinal and MAC    Transport from OR: Transported from OR on room air with adequate spontaneous ventilation    Post pain: adequate analgesia    Post assessment: tolerated procedure well and no apparent anesthetic complications    Post vital signs: stable    Level of consciousness: sedated and responds to stimulation    Nausea/Vomiting: no nausea/vomiting    Complications: none    Transfer of care protocol was followed      Last vitals:   Visit Vitals  BP (!) 179/93 (BP Location: Right arm)   Pulse 80   Temp 36.5 °C (97.7 °F) (Oral)   Resp 17   Ht 5' 5" (1.651 m)   Wt 103.9 kg (229 lb)   SpO2 95%   Breastfeeding? No   BMI 38.11 kg/m²     "

## 2018-03-13 LAB
BASOPHILS # BLD AUTO: 0.01 K/UL
BASOPHILS NFR BLD: 0.1 %
DIFFERENTIAL METHOD: ABNORMAL
EOSINOPHIL # BLD AUTO: 0.1 K/UL
EOSINOPHIL NFR BLD: 0.8 %
ERYTHROCYTE [DISTWIDTH] IN BLOOD BY AUTOMATED COUNT: 15.1 %
HCT VFR BLD AUTO: 33.7 %
HGB BLD-MCNC: 11 G/DL
LYMPHOCYTES # BLD AUTO: 1.9 K/UL
LYMPHOCYTES NFR BLD: 20.3 %
MCH RBC QN AUTO: 28.7 PG
MCHC RBC AUTO-ENTMCNC: 32.6 G/DL
MCV RBC AUTO: 88 FL
MONOCYTES # BLD AUTO: 0.9 K/UL
MONOCYTES NFR BLD: 9.7 %
NEUTROPHILS # BLD AUTO: 6.6 K/UL
NEUTROPHILS NFR BLD: 69 %
PLATELET # BLD AUTO: 230 K/UL
PMV BLD AUTO: 11.9 FL
RBC # BLD AUTO: 3.83 M/UL
WBC # BLD AUTO: 9.51 K/UL

## 2018-03-13 PROCEDURE — 94761 N-INVAS EAR/PLS OXIMETRY MLT: CPT

## 2018-03-13 PROCEDURE — 97116 GAIT TRAINING THERAPY: CPT

## 2018-03-13 PROCEDURE — G8987 SELF CARE CURRENT STATUS: HCPCS | Mod: CJ

## 2018-03-13 PROCEDURE — G8988 SELF CARE GOAL STATUS: HCPCS | Mod: CI

## 2018-03-13 PROCEDURE — 36415 COLL VENOUS BLD VENIPUNCTURE: CPT

## 2018-03-13 PROCEDURE — 97165 OT EVAL LOW COMPLEX 30 MIN: CPT

## 2018-03-13 PROCEDURE — 97110 THERAPEUTIC EXERCISES: CPT

## 2018-03-13 PROCEDURE — 25000003 PHARM REV CODE 250: Performed by: ORTHOPAEDIC SURGERY

## 2018-03-13 PROCEDURE — 63600175 PHARM REV CODE 636 W HCPCS: Performed by: ORTHOPAEDIC SURGERY

## 2018-03-13 PROCEDURE — 85025 COMPLETE CBC W/AUTO DIFF WBC: CPT

## 2018-03-13 PROCEDURE — 11000001 HC ACUTE MED/SURG PRIVATE ROOM

## 2018-03-13 RX ADMIN — SODIUM CHLORIDE, SODIUM LACTATE, POTASSIUM CHLORIDE, AND CALCIUM CHLORIDE: .6; .31; .03; .02 INJECTION, SOLUTION INTRAVENOUS at 10:03

## 2018-03-13 RX ADMIN — FAMOTIDINE 40 MG: 20 TABLET, FILM COATED ORAL at 08:03

## 2018-03-13 RX ADMIN — DOCUSATE SODIUM 100 MG: 100 CAPSULE, LIQUID FILLED ORAL at 08:03

## 2018-03-13 RX ADMIN — QUETIAPINE FUMARATE 300 MG: 100 TABLET ORAL at 08:03

## 2018-03-13 RX ADMIN — METOCLOPRAMIDE 10 MG: 10 TABLET ORAL at 06:03

## 2018-03-13 RX ADMIN — MUPIROCIN 1 G: 20 OINTMENT TOPICAL at 11:03

## 2018-03-13 RX ADMIN — HYDROCHLOROTHIAZIDE 25 MG: 25 TABLET ORAL at 11:03

## 2018-03-13 RX ADMIN — DOCUSATE SODIUM 100 MG: 100 CAPSULE, LIQUID FILLED ORAL at 11:03

## 2018-03-13 RX ADMIN — LEVOTHYROXINE SODIUM 50 MCG: 50 TABLET ORAL at 06:03

## 2018-03-13 RX ADMIN — ASPIRIN 81 MG: 81 TABLET, COATED ORAL at 08:03

## 2018-03-13 RX ADMIN — FLUOXETINE 20 MG: 20 CAPSULE ORAL at 11:03

## 2018-03-13 RX ADMIN — ACETAMINOPHEN 1000 MG: 10 INJECTION, SOLUTION INTRAVENOUS at 01:03

## 2018-03-13 RX ADMIN — OXYCODONE HYDROCHLORIDE 10 MG: 5 TABLET ORAL at 06:03

## 2018-03-13 RX ADMIN — CEFAZOLIN SODIUM 2 G: 2 SOLUTION INTRAVENOUS at 01:03

## 2018-03-13 RX ADMIN — METOCLOPRAMIDE 10 MG: 10 TABLET ORAL at 11:03

## 2018-03-13 RX ADMIN — VALSARTAN 160 MG: 80 TABLET, FILM COATED ORAL at 11:03

## 2018-03-13 RX ADMIN — OXYBUTYNIN CHLORIDE 10 MG: 5 TABLET, EXTENDED RELEASE ORAL at 11:03

## 2018-03-13 RX ADMIN — MUPIROCIN 1 G: 20 OINTMENT TOPICAL at 09:03

## 2018-03-13 RX ADMIN — CARVEDILOL 12.5 MG: 6.25 TABLET, FILM COATED ORAL at 09:03

## 2018-03-13 RX ADMIN — AMLODIPINE BESYLATE 10 MG: 5 TABLET ORAL at 11:03

## 2018-03-13 RX ADMIN — ATORVASTATIN CALCIUM 10 MG: 10 TABLET, FILM COATED ORAL at 11:03

## 2018-03-13 RX ADMIN — METOCLOPRAMIDE 10 MG: 10 TABLET ORAL at 04:03

## 2018-03-13 RX ADMIN — OXYCODONE HYDROCHLORIDE 10 MG: 5 TABLET ORAL at 08:03

## 2018-03-13 RX ADMIN — CARVEDILOL 12.5 MG: 6.25 TABLET, FILM COATED ORAL at 04:03

## 2018-03-13 RX ADMIN — SODIUM CHLORIDE, SODIUM LACTATE, POTASSIUM CHLORIDE, AND CALCIUM CHLORIDE 1000 ML: .6; .31; .03; .02 INJECTION, SOLUTION INTRAVENOUS at 04:03

## 2018-03-13 RX ADMIN — ACETAMINOPHEN 1000 MG: 10 INJECTION, SOLUTION INTRAVENOUS at 06:03

## 2018-03-13 RX ADMIN — ASPIRIN 81 MG: 81 TABLET, COATED ORAL at 11:03

## 2018-03-13 NOTE — PLAN OF CARE
Problem: Physical Therapy Goal  Goal: Physical Therapy Goal  Goals to be met by: 3/19/18    Patient will increase functional independence with mobility by performin. Supine to sit with Stand-by Assistance  2. Sit to stand transfer with Stand-by Assistance  3. Gait 250ft with stand by assistance and rolling walker  4. Lower extremity exercise program x30 reps per handout, with supervision     Outcome: Ongoing (interventions implemented as appropriate)    Patient ambulated 132ft with RW and SBA in PM session. Pain 7/10 in right knee after session. Nurse notified. Full report to follow.

## 2018-03-13 NOTE — PT/OT/SLP PROGRESS
Physical Therapy Treatment    Patient Name:  Nicolás Khan   MRN:  [id number]    Recommendations:     Discharge Recommendations:  home health PT   Discharge Equipment Recommendations: walker, rolling, bedside commode   Barriers to discharge: None    Assessment:     Nicolás Khan is a 67 y.o. female admitted with a medical diagnosis of Primary osteoarthritis of right knee.  She presents with the following impairments/functional limitations:  weakness, gait instability, impaired balance, impaired functional mobilty, pain, impaired skin, impaired joint extensibility, decreased ROM, orthopedic precautions, decreased safety awareness, decreased lower extremity function.    Rehab Prognosis:  good; patient would benefit from acute skilled PT services to address these deficits and reach maximum level of function.      Recent Surgery: Procedure(s) (LRB):  ARTHROPLASTY-KNEE (Right) 1 Day Post-Op    Plan:     During this hospitalization, patient to be seen BID to address the above listed problems via therapeutic activities, therapeutic exercises  · Plan of Care Expires:  03/19/18   Plan of Care Reviewed with: patient    Subjective     Communicated with nurse Russo prior to session.  Patient found supine in bed upon PT entry to room, agreeable to treatment.      Chief Complaint: Ready to walk.   Patient comments/goals: To walk.   Pain/Comfort:  · Pain Rating 1: 3/10  · Location - Side 1: Right  · Location 1: knee  · Pain Addressed 1: Pre-medicate for activity, Distraction, Cessation of Activity, Nurse notified  · Pain Rating Post-Intervention 1: 5/10    Objective:     Patient found with: peripheral IV, cryotherapy, FCD     General Precautions: Standard, fall   Orthopedic Precautions:RLE weight bearing as tolerated   Braces: N/A     Functional Mobility:  · Bed Mobility:     · Supine to Sit: stand by assistance  · Transfers:     · Sit to Stand:  minimum assistance with rolling walker with verbal cues for safe technique to stand    · Gait:  ~120ft with stand by assistance and rolling walker.     AM-PAC 6 CLICK MOBILITY  Turning over in bed (including adjusting bedclothes, sheets and blankets)?: 4  Sitting down on and standing up from a chair with arms (e.g., wheelchair, bedside commode, etc.): 3  Moving from lying on back to sitting on the side of the bed?: 3  Moving to and from a bed to a chair (including a wheelchair)?: 3  Need to walk in hospital room?: 3  Climbing 3-5 steps with a railing?: 1  Total Score: 17     Therapeutic Activities and Exercises:   Patient performed B LE seated therex in bedside chair x10 reps for A/P, LAQ, heel slides, hip flex, and pillow squeezes.     Patient left up in chair with all lines intact, call button in reach, nurse Rafaela notified and PCT present.    GOALS:    Physical Therapy Goals        Problem: Physical Therapy Goal    Goal Priority Disciplines Outcome Goal Variances Interventions   Physical Therapy Goal     PT/OT, PT      Description:  Goals to be met by: 3/19/18    Patient will increase functional independence with mobility by performin. Supine to sit with Stand-by Assistance  2. Sit to stand transfer with Stand-by Assistance  3. Gait assess when able  4. Lower extremity exercise program x30 reps per handout, with supervision                      Time Tracking:     PT Received On: 18  PT Start Time: 1012     PT Stop Time: 1036  PT Total Time (min): 24 min     Billable Minutes: Gait Training  12 and Therapeutic Exercise 12    Treatment Type: Treatment  PT/PTA: PT     PTA Visit Number: 0     Nia Rios, PT  2018

## 2018-03-13 NOTE — PLAN OF CARE
Problem: Patient Care Overview  Goal: Plan of Care Review  Outcome: Ongoing (interventions implemented as appropriate)   03/13/18 0637   Coping/Psychosocial   Plan Of Care Reviewed With patient

## 2018-03-13 NOTE — PLAN OF CARE
Problem: Patient Care Overview  Goal: Plan of Care Review  Outcome: Ongoing (interventions implemented as appropriate)   03/13/18 0638   Coping/Psychosocial   Plan Of Care Reviewed With patient   F/c removed with tip intact, 10 cc of water from intact balloon, 1300 cc of yellow urine noted. nguyen well. Drssg to R knee cdi, with ace bandage intact.

## 2018-03-13 NOTE — PROGRESS NOTES
Progress Note  Orthopedics    Admit Date: 3/12/2018  Post-operative Day: 1 Day Post-Op  Hospital Day: 2    Follow-up For: Procedure(s) (LRB):  ARTHROPLASTY-KNEE (Right)    SUBJECTIVE:     Nicolás Khan is 67 y.o. and is now 1 day post surgery. Pain is controlled with current analgesics.. She  She is ambulating.  Weight Bearing: WBAT    A&O. AFVSS. NVI R LE.  Dressing R knee C/D/I.  H&H 11.0/33.7.  Ambulating 120 feet with PT. Lying in bed comfortably in CPM.    OBJECTIVE:     Vital Signs (Most Recent)  Temp: 98.1 °F (36.7 °C) (03/13/18 1213)  Pulse: (!) 58 (03/13/18 1213)  Resp: 17 (03/13/18 1213)  BP: (!) 150/80 (03/13/18 1213)  SpO2: 97 % (03/13/18 1213)      Physical Exam:  Dressing: clean, dry and intact  Incision: not examined  DVT Evaluation: No evidence of DVT seen on physical exam.  Neurovascular: 5/5 motor strength, sensation intact, palpable pulses    Laboratory:  Recent Results (from the past 24 hour(s))   POCT glucose    Collection Time: 03/12/18  4:47 PM   Result Value Ref Range    POCT Glucose 123 (H) 70 - 110 mg/dL   CBC auto differential    Collection Time: 03/13/18  4:40 AM   Result Value Ref Range    WBC 9.51 3.90 - 12.70 K/uL    RBC 3.83 (L) 4.00 - 5.40 M/uL    Hemoglobin 11.0 (L) 12.0 - 16.0 g/dL    Hematocrit 33.7 (L) 37.0 - 48.5 %    MCV 88 82 - 98 fL    MCH 28.7 27.0 - 31.0 pg    MCHC 32.6 32.0 - 36.0 g/dL    RDW 15.1 (H) 11.5 - 14.5 %    Platelets 230 150 - 350 K/uL    MPV 11.9 9.2 - 12.9 fL    Gran # (ANC) 6.6 1.8 - 7.7 K/uL    Lymph # 1.9 1.0 - 4.8 K/uL    Mono # 0.9 0.3 - 1.0 K/uL    Eos # 0.1 0.0 - 0.5 K/uL    Baso # 0.01 0.00 - 0.20 K/uL    Gran% 69.0 38.0 - 73.0 %    Lymph% 20.3 18.0 - 48.0 %    Mono% 9.7 4.0 - 15.0 %    Eosinophil% 0.8 0.0 - 8.0 %    Basophil% 0.1 0.0 - 1.9 %    Differential Method Automated          ASSESSMENT/PLAN:     Assessment: orthopedic stable, condition improving  Status Post: R TKA    Plan: con't with PT and DVT prophy.  D/C planning for DME and HH PT.  Plan  d/c home with  PT tomorrow PM.  D/C IVF.

## 2018-03-13 NOTE — PLAN OF CARE
Problem: Occupational Therapy Goal  Goal: Occupational Therapy Goal  Goals to be met by: 3/17/18    Patient will increase functional independence with ADLs by performing:    LE Dressing with Modified [place] and Supervision.  Grooming while standing at sink with Supervision.  Supine to sit with Stand-by Assistance.  Stand pivot transfers with Stand-by Assistance.  Toilet transfer to toilet with Stand-by Assistance.    Outcome: Ongoing (interventions implemented as appropriate)  Patient will benefit from OT to address functional deficits.

## 2018-03-13 NOTE — PT/OT/SLP EVAL
Occupational Therapy   Evaluation    Name: Nicolás Khan  MRN: [id number]  Admitting Diagnosis:  Primary osteoarthritis of right knee 1 Day Post-Op    Recommendations:     Discharge Recommendations: home (with family)  Discharge Equipment Recommendations:  bedside commode, walker, rolling  Barriers to discharge:  None    History:     Occupational Profile:  Living Environment: The patient lives with her son and sister in a SS house with 2 JORGE and a ramp. The house is W/C accessible for her sister who has a walk in shower with a built in bench.  Previous level of function: (I) with self care and amb, without use of AE.    Equipment Owned:  none  Assistance upon Discharge: son and sister (sister uses a W/C)    Past Medical History:   Diagnosis Date    COPD (chronic obstructive pulmonary disease)     Diabetes mellitus     GERD (gastroesophageal reflux disease)     Hyperlipemia, mixed     Hypertension     Major depression     Obesity     Osteoarthritis     Thyroid disease     Tobacco abuse     Urinary incontinence, functional     Vitamin D deficiency        Past Surgical History:   Procedure Laterality Date    HYSTERECTOMY         Subjective     Chief Complaint: right knee is stiff  Patient/Family stated goals: return to (I) living    Pain/Comfort:  · Pain Rating 1: 6/10 (No pain at rest, inreased with amb)  · Location - Side 1: Right  · Location 1: knee  · Pain Addressed 1: Pre-medicate for activity, Distraction, Cessation of Activity, Nurse notified    Patients cultural, spiritual, Congregational conflicts given the current situation: none    Objective:     Patient found with: peripheral IV, cryotherapy    General Precautions: Standard, fall   Orthopedic Precautions:RLE weight bearing as tolerated   Braces: N/A     Occupational Performance:    Bed Mobility:    · Patient completed Sit to Supine with contact guard assistance and with side rail    Functional Mobility/Transfers:  · Patient completed Sit <> Stand  Transfer with minimum assistance  with  rolling walker   · Patient completed Toilet Transfer Stand Pivot technique with contact guard assistance with  grab bars, rolling walker and and verbal cues  · Functional Mobility: The patient amb using a RW from the chair to the bed with SBA.    Activities of Daily Living:  · Grooming: modified independence to wipe hands while seated  · UB Dressing: contact guard assistance    · Toileting: modified independence while seatedn the toilet    Cognitive/Visual Perceptual:  Cognitive/Psychosocial Skills:     -       Oriented to: Person, Place, Time and Situation   -       Follows Commands/attention:Follows two-step commands  -       Communication: clear/fluent  -       Memory: No Deficits noted  -       Safety awareness/insight to disability: impaired   -       Mood/Affect/Coping skills/emotional control: Appropriate to situation    Physical Exam:  Balance: -       good  Postural examination/scapula alignment:    -       No postural abnormalities identified  Skin integrity: right knee incision was cover by dressing, Polar Ice and Ace wrap  Upper Extremity Range of Motion:     -       Right Upper Extremity: WFL  -       Left Upper Extremity: WFL  Upper Extremity Strength:    -       Right Upper Extremity: WFL  -       Left Upper Extremity: WFL    Patient left HOB elevated with all lines intact, call button in reach and nurseRafaela notified    [facility] 6 Click:  [facility] Total Score: 21    Treatment & Education:  The patient tolerated OTeval. The patient was educated re: OT role and POC.  Education:    Assessment:     Nicolás Khan is a 67 y.o. female with a medical diagnosis of Primary osteoarthritis of right knee. The patient will benefit from OT to addresss functional deficits.  Performance deficits affecting function are impaired self care skills, impaired balance, impaired functional mobilty, pain, orthopedic precautions, edema, decreased ROM, decreased lower extremity function,  "impaired skin, weakness, gait instability.      Rehab Prognosis:  good; patient would benefit from acute skilled OT services to address these deficits and reach maximum level of function.         Clinical Decision Makin.  OT Low:  "Pt evaluation falls under low complexity for evaluation coding due to performance deficits noted in 1-3 areas as stated above and 0 co-morbities affecting current functional status. Data obtained from problem focused assessments. No modifications or assistance was required for completion of evaluation. Only brief occupational profile and history review completed."     Plan:     Patient to be seen 3 x/week to address the above listed problems via self-care/home management, therapeutic activities  · Plan of Care Expires: 18  · Plan of Care Reviewed with: patient    This Plan of care has been discussed with the patient who was involved in its development and understands and is in agreement with the identified goals and treatment plan    GOALS:    Occupational Therapy Goals        Problem: Occupational Therapy Goal    Goal Priority Disciplines Outcome Interventions   Occupational Therapy Goal     OT, PT/OT Ongoing (interventions implemented as appropriate)    Description:  Goals to be met by: 3/17/18    Patient will increase functional independence with ADLs by performing:    LE Dressing with Modified [place] and Supervision.  Grooming while standing at sink with Supervision.  Supine to sit with Stand-by Assistance.  Stand pivot transfers with Stand-by Assistance.  Toilet transfer to toilet with Stand-by Assistance.                      Time Tracking:     OT Date of Treatment: 18  OT Start Time: 1225  OT Stop Time: 1243  OT Total Time (min): 18 min    Billable Minutes:Evaluation 18    Gabby Martinez OT  3/13/2018    "

## 2018-03-14 VITALS
TEMPERATURE: 97 F | BODY MASS INDEX: 38.15 KG/M2 | HEART RATE: 72 BPM | SYSTOLIC BLOOD PRESSURE: 174 MMHG | HEIGHT: 65 IN | RESPIRATION RATE: 18 BRPM | DIASTOLIC BLOOD PRESSURE: 79 MMHG | WEIGHT: 229 LBS | OXYGEN SATURATION: 91 %

## 2018-03-14 LAB
BACTERIA UR CULT: NO GROWTH
BASOPHILS # BLD AUTO: 0.01 K/UL
BASOPHILS NFR BLD: 0.1 %
DIFFERENTIAL METHOD: ABNORMAL
EOSINOPHIL # BLD AUTO: 0.2 K/UL
EOSINOPHIL NFR BLD: 2.1 %
ERYTHROCYTE [DISTWIDTH] IN BLOOD BY AUTOMATED COUNT: 15 %
HCT VFR BLD AUTO: 33.4 %
HGB BLD-MCNC: 11 G/DL
LYMPHOCYTES # BLD AUTO: 2.3 K/UL
LYMPHOCYTES NFR BLD: 22.9 %
MCH RBC QN AUTO: 28.7 PG
MCHC RBC AUTO-ENTMCNC: 32.9 G/DL
MCV RBC AUTO: 87 FL
MONOCYTES # BLD AUTO: 0.8 K/UL
MONOCYTES NFR BLD: 7.8 %
NEUTROPHILS # BLD AUTO: 6.7 K/UL
NEUTROPHILS NFR BLD: 67.1 %
PLATELET # BLD AUTO: 229 K/UL
PMV BLD AUTO: 12.8 FL
POCT GLUCOSE: 108 MG/DL (ref 70–110)
POCT GLUCOSE: 92 MG/DL (ref 70–110)
RBC # BLD AUTO: 3.83 M/UL
WBC # BLD AUTO: 9.97 K/UL

## 2018-03-14 PROCEDURE — 25000003 PHARM REV CODE 250: Performed by: ORTHOPAEDIC SURGERY

## 2018-03-14 PROCEDURE — G8980 MOBILITY D/C STATUS: HCPCS | Mod: CJ

## 2018-03-14 PROCEDURE — G8987 SELF CARE CURRENT STATUS: HCPCS | Mod: CJ

## 2018-03-14 PROCEDURE — G8978 MOBILITY CURRENT STATUS: HCPCS | Mod: CJ

## 2018-03-14 PROCEDURE — 97116 GAIT TRAINING THERAPY: CPT

## 2018-03-14 PROCEDURE — G8988 SELF CARE GOAL STATUS: HCPCS | Mod: CI

## 2018-03-14 PROCEDURE — G8979 MOBILITY GOAL STATUS: HCPCS | Mod: CJ

## 2018-03-14 PROCEDURE — 97110 THERAPEUTIC EXERCISES: CPT

## 2018-03-14 PROCEDURE — G8989 SELF CARE D/C STATUS: HCPCS | Mod: CJ

## 2018-03-14 PROCEDURE — 97535 SELF CARE MNGMENT TRAINING: CPT

## 2018-03-14 PROCEDURE — 63600175 PHARM REV CODE 636 W HCPCS: Performed by: ORTHOPAEDIC SURGERY

## 2018-03-14 PROCEDURE — 63600175 PHARM REV CODE 636 W HCPCS: Performed by: HOSPITALIST

## 2018-03-14 PROCEDURE — 36415 COLL VENOUS BLD VENIPUNCTURE: CPT

## 2018-03-14 PROCEDURE — 85025 COMPLETE CBC W/AUTO DIFF WBC: CPT

## 2018-03-14 RX ORDER — HYDRALAZINE HYDROCHLORIDE 20 MG/ML
10 INJECTION INTRAMUSCULAR; INTRAVENOUS ONCE
Status: COMPLETED | OUTPATIENT
Start: 2018-03-14 | End: 2018-03-14

## 2018-03-14 RX ORDER — ASPIRIN 81 MG/1
81 TABLET ORAL 2 TIMES DAILY
Refills: 0
Start: 2018-03-14 | End: 2018-04-13

## 2018-03-14 RX ADMIN — HYDROCHLOROTHIAZIDE 25 MG: 25 TABLET ORAL at 09:03

## 2018-03-14 RX ADMIN — MORPHINE SULFATE 2 MG: 4 INJECTION INTRAVENOUS at 11:03

## 2018-03-14 RX ADMIN — LEVOTHYROXINE SODIUM 50 MCG: 50 TABLET ORAL at 05:03

## 2018-03-14 RX ADMIN — CARVEDILOL 12.5 MG: 6.25 TABLET, FILM COATED ORAL at 04:03

## 2018-03-14 RX ADMIN — ATORVASTATIN CALCIUM 10 MG: 10 TABLET, FILM COATED ORAL at 09:03

## 2018-03-14 RX ADMIN — OXYBUTYNIN CHLORIDE 10 MG: 5 TABLET, EXTENDED RELEASE ORAL at 09:03

## 2018-03-14 RX ADMIN — HYDRALAZINE HYDROCHLORIDE 10 MG: 20 INJECTION INTRAMUSCULAR; INTRAVENOUS at 01:03

## 2018-03-14 RX ADMIN — METOCLOPRAMIDE 10 MG: 10 TABLET ORAL at 04:03

## 2018-03-14 RX ADMIN — AMLODIPINE BESYLATE 10 MG: 5 TABLET ORAL at 09:03

## 2018-03-14 RX ADMIN — DOCUSATE SODIUM 100 MG: 100 CAPSULE, LIQUID FILLED ORAL at 09:03

## 2018-03-14 RX ADMIN — ASPIRIN 81 MG: 81 TABLET, COATED ORAL at 09:03

## 2018-03-14 RX ADMIN — METOCLOPRAMIDE 10 MG: 10 TABLET ORAL at 11:03

## 2018-03-14 RX ADMIN — OXYCODONE HYDROCHLORIDE 10 MG: 5 TABLET ORAL at 08:03

## 2018-03-14 RX ADMIN — CARVEDILOL 12.5 MG: 6.25 TABLET, FILM COATED ORAL at 09:03

## 2018-03-14 RX ADMIN — VALSARTAN 160 MG: 80 TABLET, FILM COATED ORAL at 09:03

## 2018-03-14 RX ADMIN — MORPHINE SULFATE 2 MG: 4 INJECTION INTRAVENOUS at 01:03

## 2018-03-14 RX ADMIN — OXYCODONE HYDROCHLORIDE 10 MG: 5 TABLET ORAL at 04:03

## 2018-03-14 RX ADMIN — MORPHINE SULFATE 2 MG: 4 INJECTION INTRAVENOUS at 05:03

## 2018-03-14 RX ADMIN — FLUOXETINE 20 MG: 20 CAPSULE ORAL at 09:03

## 2018-03-14 NOTE — PROGRESS NOTES
Pt of Dr. Mazin Fernando admitted to Dr. Queen for R knee osteoarthritis    She is scheduled for discharge today (3/14)  However, bp is elevated 216/96    She is already on norvasc 10  Coreg 12.5 bid  hct 25  And diovan 160    Hospitalist was called;  Hydralazine 10mg IV x 1 ordered  Then it was learned that pt sees Dr. Fernando  I was called.  Told to give hydralazine as ordered and notify me of bp in 2 hrs.  If bp down will be able to go home with close office followup

## 2018-03-14 NOTE — PT/OT/SLP PROGRESS
Physical Therapy Treatment    Patient Name:  Nicolás Khan   MRN:  [id number]    Recommendations:     Discharge Recommendations:  home health PT   Discharge Equipment Recommendations: bedside commode, walker, rolling   Barriers to discharge: None    Assessment:     Nicolás Khan is a 67 y.o. female admitted with a medical diagnosis of Primary osteoarthritis of right knee.  She presents with the following impairments/functional limitations:  weakness, impaired endurance, impaired functional mobilty, gait instability, impaired balance, decreased lower extremity function, decreased safety awareness, pain, decreased ROM, edema, impaired skin, orthopedic precautions, impaired joint extensibility.    Rehab Prognosis:  good; patient would benefit from acute skilled PT services to address these deficits and reach maximum level of function.      Recent Surgery: Procedure(s) (LRB):  ARTHROPLASTY-KNEE (Right) 2 Days Post-Op    Plan:     During this hospitalization, patient to be seen BID to address the above listed problems via gait training, therapeutic activities, therapeutic exercises  · Plan of Care Expires:  03/19/18   Plan of Care Reviewed with: patient, friend    Subjective     Communicated with nurse Gooden prior to session.  Patient found supine in bed upon PT entry to room, agreeable to treatment.      Chief Complaint: Pain in right knee.   Patient comments/goals: To go home.   Pain/Comfort:  · Pain Rating 1: 6/10  · Location - Side 1: Right  · Location 1: knee  · Pain Addressed 1: Pre-medicate for activity, Distraction, Cessation of Activity, Nurse notified  · Pain Rating Post-Intervention 1: 6/10    Objective:     Patient found with: peripheral IV, cryotherapy     General Precautions: Standard, fall   Orthopedic Precautions:RLE weight bearing as tolerated   Braces: N/A     Functional Mobility:  · Bed Mobility:     · Supine to Sit: stand by assistance  · Transfers:     · Sit to Stand:  stand by assistance with  rolling walker  · Toilet Transfer: contact guard assistance with bedside commode using Stand Pivot  · Gait:  ~160ft with stand by assistance and rolling walker. She needed 2 standing rest breaks due to pain in her right knee.     AM-PAC 6 CLICK MOBILITY  Turning over in bed (including adjusting bedclothes, sheets and blankets)?: 4  Sitting down on and standing up from a chair with arms (e.g., wheelchair, bedside commode, etc.): 4  Moving from lying on back to sitting on the side of the bed?: 4  Moving to and from a bed to a chair (including a wheelchair)?: 3  Need to walk in hospital room?: 3  Climbing 3-5 steps with a railing?: 2  Total Score: 20     Therapeutic Activities and Exercises:  Patient performed B LE seated therex on edge of bed x15 reps for A/P, LAQ, heel slides, hip flex, and hip abd/add.   Patient provided with written HEP to perform at home 2x15 and she verbalized understanding.     Patient left seated on edge of bed with all lines intact, call button in reach, nurse notified and friend present.    GOALS:    Physical Therapy Goals        Problem: Physical Therapy Goal    Goal Priority Disciplines Outcome Goal Variances Interventions   Physical Therapy Goal     PT/OT, PT Ongoing (interventions implemented as appropriate)     Description:  Goals to be met by: 3/19/18    Patient will increase functional independence with mobility by performin. Supine to sit with Stand-by Assistance  2. Sit to stand transfer with Stand-by Assistance  3. Gait 250ft with stand by assistance and rolling walker  4. Lower extremity exercise program x30 reps per handout, with supervision              Problem: Physical Therapy Goal    Goal Priority Disciplines Outcome Goal Variances Interventions   Physical Therapy Goal     PT/OT, PT                      Time Tracking:     PT Received On: 18  PT Start Time: 1453     PT Stop Time: 1516  PT Total Time (min): 23 min     Billable Minutes: Gait Training 13 and  Therapeutic Exercise 10    Treatment Type: Treatment  PT/PTA: PT     PTA Visit Number: 0     Nia Rios, PT  03/14/2018

## 2018-03-14 NOTE — PROGRESS NOTES
Home health orders and DME orders for rolling walker and BSC. [facility]  E  Via fax for Rolling walker and bedside commode.    [facility] accepted pt via [facility].

## 2018-03-14 NOTE — PLAN OF CARE
Problem: Occupational Therapy Goal  Goal: Occupational Therapy Goal  Goals to be met by: 3/17/18    Patient will increase functional independence with ADLs by performing:    LE Dressing with Modified [place] and Supervision.  Grooming while standing at sink with Supervision.  Supine to sit with Stand-by Assistance.  Stand pivot transfers with Stand-by Assistance.  Toilet transfer to toilet with Stand-by Assistance.     Outcome: Unable to achieve outcome(s) by discharge  The patient is being D/C to home today with family assist and home health services. The family and patient was educated re: LB dressing s/p TKA and Polar Ice use. The patient has not met her goals prior to D/C.

## 2018-03-14 NOTE — PT/OT/SLP PROGRESS
Occupational Therapy   Treatment    Name: Nicolás Khan  MRN: [id number]  Admitting Diagnosis:  Primary osteoarthritis of right knee  2 Days Post-Op    Recommendations:     Discharge Recommendations: home with home health (with family)  Discharge Equipment Recommendations:  bedside commode, walker, rolling  Barriers to discharge:  None    Subjective       Pain/Comfort:  · Pain Rating 1: 6/10  · Location - Side 1: Right  · Location 1: knee  · Pain Addressed 1: Pre-medicate for activity, Distraction, Cessation of Activity, Nurse notified    Patients cultural, spiritual, Buddhist conflicts given the current situation: none    Objective:     Patient found with: peripheral IV, cryotherapy    General Precautions: Standard, fall   Orthopedic Precautions:RLE weight bearing as tolerated   Braces: N/A     Occupational Performance:    Bed Mobility:    · N/T     Functional Mobility/Transfers:  · Patient completed Sit <> Stand Transfer with contact guard assistance  with  rolling walker and and VC for hand palcement/safety   · Functional Mobility: The patient c/o feeling tired from not sleeping and refused to amb    Activities of Daily Living:  · The patient was educated re: Polar Ice use.     Patient left up in chair with all lines intact, call button in reach, nurse notified and nusing student present    [facility] 6 Click:  [facility] Total Score: 21    Treatment & Education:  OT treatment was limited 2* the patient c/o fatigue and knee pain.  Education:    Assessment:     Nicolás Khan is a 67 y.o. female with a medical diagnosis of Primary osteoarthritis of right knee.  Performance deficits affecting function are impaired self care skills, impaired balance, gait instability, impaired functional mobilty, weakness, decreased safety awareness, pain, decreased upper extremity function, decreased lower extremity function, orthopedic precautions, impaired skin.      Rehab Prognosis:  good; patient would benefit from acute skilled OT  services to address these deficits and reach maximum level of function.       Plan:     Patient to be seen 3 x/week to address the above listed problems via self-care/home management, therapeutic exercises, therapeutic activities  · Plan of Care Expires: 03/17/18  · Plan of Care Reviewed with: patient, family    This Plan of care has been discussed with the patient who was involved in its development and understands and is in agreement with the identified goals and treatment plan    GOALS:    Occupational Therapy Goals        Problem: Occupational Therapy Goal    Goal Priority Disciplines Outcome Interventions   Occupational Therapy Goal     OT, PT/OT Unable to achieve outcome(s) by discharge    Description:  Goals to be met by: 3/17/18    Patient will increase functional independence with ADLs by performing:    LE Dressing with Modified [place] and Supervision.  Grooming while standing at sink with Supervision.  Supine to sit with Stand-by Assistance.  Stand pivot transfers with Stand-by Assistance.  Toilet transfer to toilet with Stand-by Assistance.                      Time Tracking:     OT Date of Treatment: 03/14/18  OT Start Time: 1000  OT Stop Time: 1009  OT Total Time (min): 9 min    Billable Minutes:Self Care/Home Management 9    Gabby ZACHARIAH Martinez  3/14/2018    [id number]   ( 19 min 1 SC)    The patient was seen for LB dressing. The patient required  max assist for placement of feet in pants and then CGA to stand using a RW to pull pants over the hips, The patient required VC for safety and hand placement while standing.  The patient was educated re: LB dressing techniques for S/P TKA. The patient and family verbalized understanding.   The patient was also educated re: UE therex using red theraband. The patient was given red theraband and written HEP for home use. The patient verbalized understanding of HEP as demonstrated.  The patient was left with the nurse, preparing for D/C to home.  Gabby  LUKE Martinez

## 2018-03-14 NOTE — PLAN OF CARE
Problem: Physical Therapy Goal  Goal: Physical Therapy Goal  Goals to be met by: 3/19/18    Patient will increase functional independence with mobility by performin. Supine to sit with Stand-by Assistance  2. Sit to stand transfer with Stand-by Assistance  3. Gait 250ft with stand by assistance and rolling walker  4. Lower extremity exercise program x30 reps per handout, with supervision      Outcome: Ongoing (interventions implemented as appropriate)    Patient with increased pain during PT session today.

## 2018-03-14 NOTE — NURSING
Notified  at [facility] of pt's elevated BP /96. Stated Dr. Queen wasn't in office yet and will leave message.

## 2018-03-14 NOTE — PT/OT/SLP PROGRESS
Physical Therapy Treatment    Patient Name:  Nicolás Khan   MRN:  [id number]    Recommendations:     Discharge Recommendations:  home health PT   Discharge Equipment Recommendations: bedside commode, walker, rolling   Barriers to discharge: None    Assessment:     Nicolás Khan is a 67 y.o. female admitted with a medical diagnosis of Primary osteoarthritis of right knee.  She presents with the following impairments/functional limitations:  weakness, impaired functional mobilty, gait instability, impaired balance, decreased lower extremity function, decreased safety awareness, pain, edema, impaired skin, orthopedic precautions, impaired joint extensibility, decreased ROM.    Rehab Prognosis:  good; patient would benefit from acute skilled PT services to address these deficits and reach maximum level of function.      Recent Surgery: Procedure(s) (LRB):  ARTHROPLASTY-KNEE (Right) 2 Days Post-Op    Plan:     During this hospitalization, patient to be seen BID to address the above listed problems via gait training, therapeutic activities, therapeutic exercises  · Plan of Care Expires:  03/19/18   Plan of Care Reviewed with: patient    Subjective     Communicated with nurse Russo prior to session.  Patient found supine in bed upon PT entry to room, agreeable to treatment.      Chief Complaint: Pain in right knee.   Patient comments/goals: To walk and go home.   Pain/Comfort:  · Pain Rating 1: 5/10  · Location - Side 1: Right  · Location 1: knee  · Pain Addressed 1: Pre-medicate for activity, Distraction, Cessation of Activity, Nurse notified  · Pain Rating Post-Intervention 1: 7/10    Objective:     Patient found with: peripheral IV, cryotherapy     General Precautions: Standard, fall   Orthopedic Precautions:RLE weight bearing as tolerated   Braces: N/A     Functional Mobility:  · Bed Mobility:     · Supine to Sit: stand by assistance  · Transfers:     · Sit to Stand:  contact guard assistance with rolling  walker  · Gait:  ~132ft with stand by assistance and rolling walker    AM-PAC 6 CLICK MOBILITY  Turning over in bed (including adjusting bedclothes, sheets and blankets)?: 4  Sitting down on and standing up from a chair with arms (e.g., wheelchair, bedside commode, etc.): 3  Moving from lying on back to sitting on the side of the bed?: 4  Moving to and from a bed to a chair (including a wheelchair)?: 3  Need to walk in hospital room?: 3  Climbing 3-5 steps with a railing?: 2  Total Score: 19     Therapeutic Activities and Exercises:  Patient performed B LE seated therex on edge of bed x15 reps for A/P, LAQ, heel slides, hip flex, and hip abd/add.     Patient left seated on edge of bed  with all lines intact, call button in reach, nurse notified and student nurse and family present.    GOALS:    Physical Therapy Goals        Problem: Physical Therapy Goal    Goal Priority Disciplines Outcome Goal Variances Interventions   Physical Therapy Goal     PT/OT, PT Ongoing (interventions implemented as appropriate)     Description:  Goals to be met by: 3/19/18    Patient will increase functional independence with mobility by performin. Supine to sit with Stand-by Assistance  2. Sit to stand transfer with Stand-by Assistance  3. Gait 250ft with stand by assistance and rolling walker  4. Lower extremity exercise program x30 reps per handout, with supervision              Problem: Physical Therapy Goal    Goal Priority Disciplines Outcome Goal Variances Interventions   Physical Therapy Goal     PT/OT, PT                      Time Tracking:     PT Received On: 18  PT Start Time: 1510     PT Stop Time: 1534  PT Total Time (min): 24 min     Billable Minutes: Gait Training  12 and Therapeutic Exercise 12    Treatment Type: Treatment  PT/PTA: PT     PTA Visit Number: 0     Nia Rios, PT  2018

## 2018-03-14 NOTE — PROGRESS NOTES
Progress Note  Orthopedics    Admit Date: 3/12/2018  Post-operative Day: 2 Days Post-Op  Hospital Day: 3    Follow-up For: Procedure(s) (LRB):  ARTHROPLASTY-KNEE (Right)    SUBJECTIVE:     Nicolás Khan is 67 y.o. and is now 2 days post surgery. Pain is controlled with current analgesics.. She  She is ambulating.  Weight Bearing: WBAT    A&O. AFVSS. NVI R LE.  Dressing R knee C/D/I.  Ambulating 132 feet with PT.  Pain controlled. H&H - 11.0/33.4.    OBJECTIVE:     Vital Signs (Most Recent)  Temp: 96.7 °F (35.9 °C) (03/14/18 1151)  Pulse: 70 (03/14/18 1151)  Resp: 18 (03/14/18 1151)  BP: (!) 216/96 (03/14/18 1151)  SpO2: (!) 91 % (03/14/18 1151)      Physical Exam:  Dressing: clean, dry and intact  Incision: not examined  DVT Evaluation: No evidence of DVT seen on physical exam.  Negative Doug's sign.  No cords or calf tenderness.  No significant calf/ankle edema.  Neurovascular: 5/5 motor strength, sensation intact, palpable pulses    Laboratory:  Recent Results (from the past 24 hour(s))   CBC auto differential    Collection Time: 03/14/18  4:07 AM   Result Value Ref Range    WBC 9.97 3.90 - 12.70 K/uL    RBC 3.83 (L) 4.00 - 5.40 M/uL    Hemoglobin 11.0 (L) 12.0 - 16.0 g/dL    Hematocrit 33.4 (L) 37.0 - 48.5 %    MCV 87 82 - 98 fL    MCH 28.7 27.0 - 31.0 pg    MCHC 32.9 32.0 - 36.0 g/dL    RDW 15.0 (H) 11.5 - 14.5 %    Platelets 229 150 - 350 K/uL    MPV 12.8 9.2 - 12.9 fL    Gran # (ANC) 6.7 1.8 - 7.7 K/uL    Lymph # 2.3 1.0 - 4.8 K/uL    Mono # 0.8 0.3 - 1.0 K/uL    Eos # 0.2 0.0 - 0.5 K/uL    Baso # 0.01 0.00 - 0.20 K/uL    Gran% 67.1 38.0 - 73.0 %    Lymph% 22.9 18.0 - 48.0 %    Mono% 7.8 4.0 - 15.0 %    Eosinophil% 2.1 0.0 - 8.0 %    Basophil% 0.1 0.0 - 1.9 %    Differential Method Automated    POCT glucose    Collection Time: 03/14/18  8:02 AM   Result Value Ref Range    POCT Glucose 92 70 - 110 mg/dL   POCT glucose    Collection Time: 03/14/18 11:48 AM   Result Value Ref Range    POCT Glucose 108 70 - 110  mg/dL         ASSESSMENT/PLAN:     Assessment: orthopedic stable, condition improving  Status Post: R TKA    Plan: d/c home with HH PT this PM after PT.  Con't ASA 81mg po BID for 30 days.  Con't Teds B/L for 3 weeks.  WBAT. OK to shower once home.  RTC 2 weeks.    D/C if OK with hospitalist regarding hypertension

## 2018-03-14 NOTE — PLAN OF CARE
Problem: Fall Risk (Adult)  Goal: Absence of Falls  Patient will demonstrate the desired outcomes by discharge/transition of care.   Outcome: Ongoing (interventions implemented as appropriate)   03/14/18 0656   Fall Risk (Adult)   Absence of Falls making progress toward outcome   Pt has remained free of falls this shift

## 2018-03-14 NOTE — PLAN OF CARE
03/14/18 1514   Final Note   Assessment Type Final Discharge Note   Discharge Disposition Home-[facility]   Hospital Follow Up  Appt(s) scheduled? Yes   Discharge plans and expectations educations in teach back method with documentation complete? Yes   Right Care Referral Info   Post Acute Recommendation Home-care

## 2018-03-14 NOTE — NURSING
Dr Katz stated it's ok for pt to be discharged. Stated to have home health call office tomorrow with pt's BP reading

## 2018-03-14 NOTE — PT/OT/SLP PROGRESS
Physical Therapy Treatment    Patient Name:  Nicolás Khan   MRN:  [id number]    Recommendations:     Discharge Recommendations:  home health PT   Discharge Equipment Recommendations: bedside commode, walker, rolling   Barriers to discharge: None    Assessment:     Nicolás Khan is a 67 y.o. female admitted with a medical diagnosis of Primary osteoarthritis of right knee.  She presents with the following impairments/functional limitations:  weakness, impaired functional mobilty, gait instability, impaired balance, decreased lower extremity function, decreased safety awareness, pain, decreased ROM, impaired skin, edema, impaired joint extensibility, orthopedic precautions.    Rehab Prognosis:  good; patient would benefit from acute skilled PT services to address these deficits and reach maximum level of function.      Recent Surgery: Procedure(s) (LRB):  ARTHROPLASTY-KNEE (Right) 2 Days Post-Op    Plan:     During this hospitalization, patient to be seen BID to address the above listed problems via gait training, therapeutic activities, therapeutic exercises  · Plan of Care Expires:  03/19/18   Plan of Care Reviewed with: patient    Subjective     Communicated with nurse Gooden prior to session.  Patient found reclined in bedside chair upon PT entry to room, agreeable to treatment.      Chief Complaint: Did not sleep overnight due to her pain being too high.   Patient comments/goals: To walk without pain.   Pain/Comfort:  · Pain Rating 1: 5/10 (at rest)  · Location - Side 1: Right  · Location 1: knee  · Pain Addressed 1: Pre-medicate for activity, Distraction, Cessation of Activity, Nurse notified  · Pain Rating Post-Intervention 1: 8/10 (with ambulation)    Objective:     Patient found with: peripheral IV, cryotherapy     General Precautions: Standard, fall   Orthopedic Precautions:RLE weight bearing as tolerated   Braces: N/A     Functional Mobility:  · Bed Mobility:     · Sit to Supine: stand by  assistance  · Transfers:     · Sit to Stand:  contact guard assistance with rolling walker  · Gait:  ~120ft with multiple standing rest breaks due to pain in right knee.      AM-PAC 6 CLICK MOBILITY  Turning over in bed (including adjusting bedclothes, sheets and blankets)?: 4  Sitting down on and standing up from a chair with arms (e.g., wheelchair, bedside commode, etc.): 3  Moving from lying on back to sitting on the side of the bed?: 4  Moving to and from a bed to a chair (including a wheelchair)?: 3  Need to walk in hospital room?: 3  Climbing 3-5 steps with a railing?: 2  Total Score: 19     Therapeutic Activities and Exercises:  Patient performed B LE seated therex on edge of bed x15 reps for A/P, LAQ, heel slides, hip flex, and hip abd/add.   CPM placed to right knee and set at 0-85 degrees. CPM placed at 10:35 and nurse notified to remove at 12:35.     Patient left supine with all lines intact, call button in reach, nurse Berkley notified, and CPM on.    GOALS:    Physical Therapy Goals        Problem: Physical Therapy Goal    Goal Priority Disciplines Outcome Goal Variances Interventions   Physical Therapy Goal     PT/OT, PT Ongoing (interventions implemented as appropriate)     Description:  Goals to be met by: 3/19/18    Patient will increase functional independence with mobility by performin. Supine to sit with Stand-by Assistance  2. Sit to stand transfer with Stand-by Assistance  3. Gait 250ft with stand by assistance and rolling walker  4. Lower extremity exercise program x30 reps per handout, with supervision              Problem: Physical Therapy Goal    Goal Priority Disciplines Outcome Goal Variances Interventions   Physical Therapy Goal     PT/OT, PT                      Time Tracking:     PT Received On: 18  PT Start Time: 1013     PT Stop Time: 1039  PT Total Time (min): 26 min     Billable Minutes: Gait Training  13 and Therapeutic Exercise 13    Treatment Type:  Treatment  PT/PTA: PT     PTA Visit Number: 0     Nia Rios, PT  03/14/2018

## 2018-03-14 NOTE — PT/OT/SLP DISCHARGE
Physical Therapy Discharge Summary    Name: Nicolás Khan  MRN: [id number]   Principal Problem: Primary osteoarthritis of right knee     Patient Discharged from acute Physical Therapy on 3/14/18.  Please refer to prior PT noted date on 3/14/18 for functional status.     Assessment:     Goals partially met. Patient appropriate for care in another setting.    Objective:     GOALS:    Physical Therapy Goals        Problem: Physical Therapy Goal    Goal Priority Disciplines Outcome Goal Variances Interventions   Physical Therapy Goal     PT/OT, PT Ongoing (interventions implemented as appropriate)     Description:  Goals to be met by: 3/19/18    Patient will increase functional independence with mobility by performin. Supine to sit with Stand-by Assistance  2. Sit to stand transfer with Stand-by Assistance  3. Gait 250ft with stand by assistance and rolling walker  4. Lower extremity exercise program x30 reps per handout, with supervision              Problem: Physical Therapy Goal    Goal Priority Disciplines Outcome Goal Variances Interventions   Physical Therapy Goal     PT/OT, PT                      Reasons for Discontinuation of Therapy Services  Transfer to alternate level of care.      Plan:     Patient Discharged to: Home with Home Health Service.    Nia Rios, PT  3/14/2018

## 2018-03-14 NOTE — PLAN OF CARE
03/14/18 1010   Medicare Message   Important Message from Medicare regarding Discharge Appeal Rights Given to patient/caregiver;Explained to patient/caregiver;Signed/date by patient/caregiver   Date IMM was signed 03/14/18   Time IMM was signed 1010

## 2018-03-14 NOTE — PROGRESS NOTES
Rolling walker and bedside commode order sent to [facility] via fax to get approval to pull from DME closet    1450 call received from [place] with [facility] stating that the equipment can be pulled from the closet for pt. [place] advised that we will pull rolling walker and BSC can be delivered to the home.    1510- notified Ameena BURDICK that [place] gave approval to pull equipment from DME closet.  Ameena to pull and deliver to the pt

## 2018-03-14 NOTE — NURSING
Pt's discharge instructions, prescriptions and follow up appts given. Verbalized understanding. IV discontinued. Belongings and walker at bedside. Transport requested

## 2018-03-14 NOTE — PROGRESS NOTES
WRITTEN HEALTHCARE DISCHARGE INFORMATION      Things that YOU are responsible for to Manage Your Care At Home:  1. Getting your prescriptions filled.  2. Taking you medications as directed. DO NOT MISS ANY DOSES!  3. Going to your follow-up doctor appointments. This is important because it allows the doctor to monitor your progress and to determine if any changes need to be made to your treatment plan.     If you are unable to make your follow up appointments, please call the number listed and reschedule this appointment.      After discharge, if you need assistance, you can call [facility] On Call Nurse Care Line for 24/7 assistance at [phone]     If you are experience any signs or symptoms, Call your doctor or Call 911 and come to your nearest Emergency Room.     Thank you for choosing [facility] and allowing us to care for you.        You should receive a call from [facility] Discharge Department within 48-72 hours to help manage your care after discharge. Please try to make sure that you answer your phone for this important phone call.     Follow-up Information     Yao Queen MD On 3/26/2018.    Specialty:  Orthopedic Surgery  Why:  at 10am. Follow up appointment.  Contact information:  [address]  [facility]  [place] [address]  [phone]             [facility] [place].    Specialty:  Home Health Services  Contact information:  [address]  [place], located within [facility] [id number] [phone]             [facility].    Specialty:  DME Provider  Why:  DME- provider of rolling walker and bedside commode  Contact information:  [address]  [facility] [place] [address]  [phone]

## 2018-03-14 NOTE — DISCHARGE SUMMARY
".Physician Discharge Summary     Patient ID:  Nicolás Khan  [id number]  67 y.o.  1950    Admit date: 3/12/2018    Discharge date and time: No discharge date for patient encounter.     Admitting Physician: Yao Queen MD     Admission Diagnoses: Primary osteoarthritis of right knee [M17.11]  Primary osteoarthritis of right knee [M17.11]  Primary osteoarthritis of right knee [M17.11]    Discharge Diagnoses: R knee OA    Admission Condition: good    Discharged Condition: good    Discharge Exam:    BP (!) 216/96 (BP Location: Left arm, Patient Position: Lying)   Pulse 70   Temp 96.7 °F (35.9 °C) (Oral)   Resp 18   Ht 5' 5" (1.651 m)   Wt 103.9 kg (229 lb)   SpO2 (!) 91%   Breastfeeding? No   BMI 38.11 kg/m²     General Appearance:    Alert, cooperative, no distress, appears stated age   Head:    Normocephalic, without obvious abnormality, atraumatic   Eyes:    PERRL, conjunctiva/corneas clear, EOM's intact, fundi     benign, both eyes   Ears:    Normal TM's and external ear canals, both ears   Nose:   Nares normal, septum midline, mucosa normal, no drainage    or sinus tenderness   Throat:   Lips, mucosa, and tongue normal; teeth and gums normal   Neck:   Supple, symmetrical, trachea midline, no adenopathy;     thyroid:  no enlargement/tenderness/nodules; no carotid    bruit or JVD   Back:     Symmetric, no curvature, ROM normal, no CVA tenderness   Lungs:     Clear to auscultation bilaterally, respirations unlabored   Chest Wall:    No tenderness or deformity    Heart:    Regular rate and rhythm, S1 and S2 normal, no murmur, rub   or gallop   Breast Exam:    No tenderness, masses, or nipple abnormality   Abdomen:     Soft, non-tender, bowel sounds active all four quadrants,     no masses, no organomegaly   Genitalia:    Normal female without lesion, discharge or tenderness   Rectal:    Normal tone, no masses or tenderness;    guaiac negative stool   Extremities:   Extremities normal, atraumatic, " no cyanosis or edema   Pulses:   2+ and symmetric all extremities   Skin:   Skin color, texture, turgor normal, no rashes or lesions   Lymph nodes:   Cervical, supraclavicular, and axillary nodes normal   Neurologic:   CNII-XII intact, normal strength, sensation and reflexes     throughout       Disposition: Final discharge disposition not confirmed    Patient Instructions:   Current Discharge Medication List      CONTINUE these medications which have CHANGED    Details   aspirin (ECOTRIN) 81 MG EC tablet Take 1 tablet (81 mg total) by mouth 2 (two) times daily.  Refills: 0         CONTINUE these medications which have NOT CHANGED    Details   carvedilol (COREG) 12.5 MG tablet Take 12.5 mg by mouth 2 (two) times daily with meals.      albuterol (VENTOLIN HFA) 90 mcg/actuation inhaler Inhale 2 puffs into the lungs every 6 (six) hours as needed for Wheezing. Rescue      amLODIPine-valsartan-hcthiazid -25 mg Tab Take 1 tablet by mouth once daily.      atorvastatin (LIPITOR) 10 MG tablet Take 10 mg by mouth once daily.      famotidine (PEPCID) 40 MG tablet Take 40 mg by mouth nightly.      FLUoxetine (PROZAC) 20 MG capsule Take 20 mg by mouth once daily.      levothyroxine (SYNTHROID) 50 MCG tablet Take 50 mcg by mouth once daily.      metoclopramide HCl (REGLAN) 10 MG tablet Take 10 mg by mouth 3 (three) times daily before meals.      oxybutynin (DITROPAN XL) 10 MG 24 hr tablet Take 10 mg by mouth once daily.      QUEtiapine (SEROQUEL) 300 MG Tab Take 300 mg by mouth nightly.      umeclidinium-vilanterol 62.5-25 mcg/actuation DsDv Inhale 1 puff into the lungs once daily. Controller         STOP taking these medications       meloxicam (MOBIC) 15 MG tablet            Activity: activity as tolerated  Diet: regular diet  Wound Care: keep wound clean and dry, reinforce dressing PRN, ice to area for comfort and do not remove dressing    Follow-up with Dr Queen in 2 weeks.    Signed:  Yao CHRISTIAN  Bret  3/14/2018  12:57 PM

## 2018-03-15 NOTE — PROGRESS NOTES
1400 upon investigating if patient was using own MDI, patient stated she did not bring her Anoro to the hospital upon admission.

## 2021-06-17 ENCOUNTER — HOSPITAL ENCOUNTER (EMERGENCY)
Facility: HOSPITAL | Age: 71
Discharge: HOME | End: 2021-06-18
Attending: EMERGENCY MEDICINE
Payer: MEDICARE

## 2021-06-17 DIAGNOSIS — M25.512 ACUTE PAIN OF LEFT SHOULDER: ICD-10-CM

## 2021-06-17 DIAGNOSIS — F10.921 ALCOHOL INTOXICATION WITH DELIRIUM: ICD-10-CM

## 2021-06-17 DIAGNOSIS — S42.292A HUMERAL HEAD FRACTURE, LEFT, CLOSED, INITIAL ENCOUNTER: ICD-10-CM
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
## 2021-06-17 DIAGNOSIS — R41.82 ALTERED MENTAL STATUS: Primary | ICD-10-CM

## 2021-06-17 DIAGNOSIS — I10 ESSENTIAL HYPERTENSION: ICD-10-CM

## 2021-06-17 DIAGNOSIS — R03.0 ELEVATED BLOOD PRESSURE READING: ICD-10-CM

## 2021-06-17 LAB
ALBUMIN SERPL BCP-MCNC: 3.8 G/DL (ref 3.5–5.2)
ALP SERPL-CCNC: 80 U/L (ref 55–135)
ALT SERPL W/O P-5'-P-CCNC: 27 U/L (ref 10–44)
ANION GAP SERPL CALC-SCNC: 11 MMOL/L (ref 8–16)
AST SERPL-CCNC: 25 U/L (ref 10–40)
BASOPHILS # BLD AUTO: 0.04 K/UL (ref 0–0.2)
BASOPHILS NFR BLD: 0.4 % (ref 0–1.9)
BILIRUB SERPL-MCNC: 0.2 MG/DL (ref 0.1–1)
BUN SERPL-MCNC: 9 MG/DL (ref 8–23)
CALCIUM SERPL-MCNC: 8.5 MG/DL (ref 8.7–10.5)
CHLORIDE SERPL-SCNC: 107 MMOL/L (ref 95–110)
CO2 SERPL-SCNC: 26 MMOL/L (ref 23–29)
CREAT SERPL-MCNC: 0.7 MG/DL (ref 0.5–1.4)
DIFFERENTIAL METHOD: ABNORMAL
EOSINOPHIL # BLD AUTO: 0.2 K/UL (ref 0–0.5)
EOSINOPHIL NFR BLD: 2 % (ref 0–8)
ERYTHROCYTE [DISTWIDTH] IN BLOOD BY AUTOMATED COUNT: 15.7 % (ref 11.5–14.5)
EST. GFR  (AFRICAN AMERICAN): >60 ML/MIN/1.73 M^2
EST. GFR  (NON AFRICAN AMERICAN): >60 ML/MIN/1.73 M^2
GLUCOSE SERPL-MCNC: 107 MG/DL (ref 70–110)
HCT VFR BLD AUTO: 40.9 % (ref 37–48.5)
HGB BLD-MCNC: 13.2 G/DL (ref 12–16)
IMM GRANULOCYTES # BLD AUTO: 0.03 K/UL (ref 0–0.04)
IMM GRANULOCYTES NFR BLD AUTO: 0.3 % (ref 0–0.5)
LYMPHOCYTES # BLD AUTO: 3.3 K/UL (ref 1–4.8)
LYMPHOCYTES NFR BLD: 35.5 % (ref 18–48)
MCH RBC QN AUTO: 30.1 PG (ref 27–31)
MCHC RBC AUTO-ENTMCNC: 32.3 G/DL (ref 32–36)
MCV RBC AUTO: 93 FL (ref 82–98)
MONOCYTES # BLD AUTO: 0.6 K/UL (ref 0.3–1)
MONOCYTES NFR BLD: 6.2 % (ref 4–15)
NEUTROPHILS # BLD AUTO: 5.2 K/UL (ref 1.8–7.7)
NEUTROPHILS NFR BLD: 55.6 % (ref 38–73)
NRBC BLD-RTO: 0 /100 WBC
PLATELET # BLD AUTO: ABNORMAL K/UL (ref 150–450)
PMV BLD AUTO: ABNORMAL FL (ref 9.2–12.9)
POTASSIUM SERPL-SCNC: 3.5 MMOL/L (ref 3.5–5.1)
PROT SERPL-MCNC: 7.7 G/DL (ref 6–8.4)
RBC # BLD AUTO: 4.39 M/UL (ref 4–5.4)
SODIUM SERPL-SCNC: 144 MMOL/L (ref 136–145)
TROPONIN I SERPL DL<=0.01 NG/ML-MCNC: 0.01 NG/ML (ref 0–0.03)
WBC # BLD AUTO: 9.29 K/UL (ref 3.9–12.7)

## 2021-06-17 PROCEDURE — 96374 THER/PROPH/DIAG INJ IV PUSH: CPT

## 2021-06-17 PROCEDURE — 93005 ELECTROCARDIOGRAM TRACING: CPT

## 2021-06-17 PROCEDURE — 85025 COMPLETE CBC W/AUTO DIFF WBC: CPT | Performed by: EMERGENCY MEDICINE

## 2021-06-17 PROCEDURE — 99285 EMERGENCY DEPT VISIT HI MDM: CPT | Mod: 25

## 2021-06-17 PROCEDURE — 82077 ASSAY SPEC XCP UR&BREATH IA: CPT | Performed by: EMERGENCY MEDICINE

## 2021-06-17 PROCEDURE — 80053 COMPREHEN METABOLIC PANEL: CPT | Performed by: EMERGENCY MEDICINE

## 2021-06-17 PROCEDURE — 84443 ASSAY THYROID STIM HORMONE: CPT | Performed by: EMERGENCY MEDICINE

## 2021-06-17 PROCEDURE — 93010 EKG 12-LEAD: ICD-10-PCS | Mod: ,,, | Performed by: INTERNAL MEDICINE

## 2021-06-17 PROCEDURE — 84484 ASSAY OF TROPONIN QUANT: CPT | Performed by: EMERGENCY MEDICINE

## 2021-06-17 PROCEDURE — 93010 ELECTROCARDIOGRAM REPORT: CPT | Mod: ,,, | Performed by: INTERNAL MEDICINE

## 2021-06-17 RX ORDER — KETOROLAC TROMETHAMINE 30 MG/ML
15 INJECTION, SOLUTION INTRAMUSCULAR; INTRAVENOUS
Status: COMPLETED | OUTPATIENT
Start: 2021-06-17 | End: 2021-06-18

## 2021-06-18 VITALS
TEMPERATURE: 98 F | RESPIRATION RATE: 22 BRPM | DIASTOLIC BLOOD PRESSURE: 82 MMHG | HEIGHT: 65 IN | BODY MASS INDEX: 38.15 KG/M2 | HEART RATE: 78 BPM | WEIGHT: 229 LBS | SYSTOLIC BLOOD PRESSURE: 188 MMHG | OXYGEN SATURATION: 100 %

## 2021-06-18 LAB
AMPHET+METHAMPHET UR QL: NEGATIVE
BARBITURATES UR QL SCN>200 NG/ML: NEGATIVE
BENZODIAZ UR QL SCN>200 NG/ML: NEGATIVE
BILIRUB UR QL STRIP: NEGATIVE
BZE UR QL SCN: NEGATIVE
CANNABINOIDS UR QL SCN: NEGATIVE
CK SERPL-CCNC: 194 U/L (ref 20–180)
CLARITY UR: CLEAR
COLOR UR: COLORLESS
CREAT UR-MCNC: 37.7 MG/DL (ref 15–325)
ETHANOL SERPL-MCNC: 195 MG/DL
GLUCOSE UR QL STRIP: NEGATIVE
HGB UR QL STRIP: NEGATIVE
KETONES UR QL STRIP: NEGATIVE
LEUKOCYTE ESTERASE UR QL STRIP: NEGATIVE
METHADONE UR QL SCN>300 NG/ML: NEGATIVE
NITRITE UR QL STRIP: NEGATIVE
OPIATES UR QL SCN: NEGATIVE
PCP UR QL SCN>25 NG/ML: NEGATIVE
PH UR STRIP: 7 [PH] (ref 5–8)
PROT UR QL STRIP: NEGATIVE
SP GR UR STRIP: 1.01 (ref 1–1.03)
TOXICOLOGY INFORMATION: NORMAL
TSH SERPL DL<=0.005 MIU/L-ACNC: 0.91 UIU/ML (ref 0.4–4)
URN SPEC COLLECT METH UR: ABNORMAL
UROBILINOGEN UR STRIP-ACNC: NEGATIVE EU/DL

## 2021-06-18 PROCEDURE — 81003 URINALYSIS AUTO W/O SCOPE: CPT | Mod: 59 | Performed by: EMERGENCY MEDICINE

## 2021-06-18 PROCEDURE — 63600175 PHARM REV CODE 636 W HCPCS: Performed by: EMERGENCY MEDICINE

## 2021-06-18 PROCEDURE — 25000003 PHARM REV CODE 250: Performed by: EMERGENCY MEDICINE

## 2021-06-18 PROCEDURE — 80307 DRUG TEST PRSMV CHEM ANLYZR: CPT | Performed by: EMERGENCY MEDICINE

## 2021-06-18 PROCEDURE — 82550 ASSAY OF CK (CPK): CPT | Performed by: EMERGENCY MEDICINE

## 2021-06-18 RX ORDER — HYDROCHLOROTHIAZIDE 25 MG/1
25 TABLET ORAL
Status: COMPLETED | OUTPATIENT
Start: 2021-06-18 | End: 2021-06-18

## 2021-06-18 RX ORDER — CARVEDILOL 12.5 MG/1
12.5 TABLET ORAL
Status: COMPLETED | OUTPATIENT
Start: 2021-06-18 | End: 2021-06-18

## 2021-06-18 RX ORDER — AMLODIPINE BESYLATE 5 MG/1
10 TABLET ORAL
Status: COMPLETED | OUTPATIENT
Start: 2021-06-18 | End: 2021-06-18

## 2021-06-18 RX ORDER — VALSARTAN 80 MG/1
160 TABLET ORAL 2 TIMES DAILY
Status: DISCONTINUED | OUTPATIENT
Start: 2021-06-18 | End: 2021-06-18 | Stop reason: HOSPADM

## 2021-06-18 RX ORDER — KETOROLAC TROMETHAMINE 10 MG/1
10 TABLET, FILM COATED ORAL EVERY 6 HOURS PRN
Qty: 20 TABLET | Refills: 1 | Status: ON HOLD | OUTPATIENT
Start: 2021-06-18 | End: 2023-01-05 | Stop reason: HOSPADM

## 2021-06-18 RX ORDER — OXYBUTYNIN CHLORIDE 5 MG/1
10 TABLET, EXTENDED RELEASE ORAL
Status: COMPLETED | OUTPATIENT
Start: 2021-06-18 | End: 2021-06-18

## 2021-06-18 RX ADMIN — KETOROLAC TROMETHAMINE 15 MG: 30 INJECTION, SOLUTION INTRAMUSCULAR; INTRAVENOUS at 02:06

## 2021-06-18 RX ADMIN — AMLODIPINE BESYLATE 10 MG: 5 TABLET ORAL at 03:06

## 2021-06-18 RX ADMIN — CARVEDILOL 12.5 MG: 12.5 TABLET, FILM COATED ORAL at 03:06

## 2021-06-18 RX ADMIN — HYDROCHLOROTHIAZIDE 25 MG: 25 TABLET ORAL at 03:06

## 2021-06-18 RX ADMIN — OXYBUTYNIN CHLORIDE 10 MG: 5 TABLET, EXTENDED RELEASE ORAL at 03:06

## 2022-12-30 ENCOUNTER — HOSPITAL ENCOUNTER (INPATIENT)
Facility: HOSPITAL | Age: 72
LOS: 7 days | Discharge: NURSING, REHAB OR LONG-TERM CARE | DRG: 065 | End: 2023-01-06
Attending: STUDENT IN AN ORGANIZED HEALTH CARE EDUCATION/TRAINING PROGRAM | Admitting: PSYCHIATRY & NEUROLOGY
Payer: MEDICARE

## 2022-12-30 DIAGNOSIS — I67.848 OTHER CEREBROVASCULAR VASOSPASM AND VASOCONSTRICTION: ICD-10-CM

## 2022-12-30 DIAGNOSIS — F17.210 CIGARETTE NICOTINE DEPENDENCE WITHOUT COMPLICATION: ICD-10-CM

## 2022-12-30 DIAGNOSIS — I63.412 CEREBRAL INFARCTION DUE TO EMBOLISM OF LEFT MIDDLE CEREBRAL ARTERY: ICD-10-CM

## 2022-12-30 DIAGNOSIS — R94.31 QT PROLONGATION: ICD-10-CM

## 2022-12-30 DIAGNOSIS — I63.9 STROKE: Primary | ICD-10-CM

## 2022-12-30 PROBLEM — I10 ESSENTIAL HYPERTENSION: Status: ACTIVE | Noted: 2022-12-30

## 2022-12-30 PROBLEM — G89.29 CHRONIC BACK PAIN: Status: ACTIVE | Noted: 2022-12-30

## 2022-12-30 PROBLEM — Z72.0 TOBACCO ABUSE: Status: ACTIVE | Noted: 2022-12-30

## 2022-12-30 PROBLEM — E66.01 MORBID OBESITY: Status: ACTIVE | Noted: 2021-11-04

## 2022-12-30 PROBLEM — F33.1 MODERATE RECURRENT MAJOR DEPRESSION: Status: ACTIVE | Noted: 2022-12-30

## 2022-12-30 PROBLEM — E11.9 TYPE 2 DIABETES MELLITUS: Status: ACTIVE | Noted: 2022-12-30

## 2022-12-30 PROBLEM — Z91.148 HISTORY OF MEDICATION NONCOMPLIANCE: Status: ACTIVE | Noted: 2022-12-30

## 2022-12-30 PROBLEM — I25.10 CORONARY ARTERY CALCIFICATION SEEN ON CT SCAN: Status: ACTIVE | Noted: 2021-11-04

## 2022-12-30 PROBLEM — E03.9 ADULT HYPOTHYROIDISM: Status: ACTIVE | Noted: 2022-12-30

## 2022-12-30 PROBLEM — M17.0 ARTHRITIS OF BOTH KNEES: Status: ACTIVE | Noted: 2022-12-30

## 2022-12-30 PROBLEM — E78.2 MIXED HYPERLIPIDEMIA: Status: ACTIVE | Noted: 2022-12-30

## 2022-12-30 PROBLEM — M54.9 CHRONIC BACK PAIN: Status: ACTIVE | Noted: 2022-12-30

## 2022-12-30 PROBLEM — I70.0 AORTIC ATHEROSCLEROSIS: Status: ACTIVE | Noted: 2021-11-04

## 2022-12-30 PROBLEM — R80.9 MICROALBUMINURIA: Status: ACTIVE | Noted: 2022-11-07

## 2022-12-30 LAB
ALBUMIN SERPL BCP-MCNC: 3.9 G/DL (ref 3.5–5.2)
ALP SERPL-CCNC: 90 U/L (ref 55–135)
ALT SERPL W/O P-5'-P-CCNC: 15 U/L (ref 10–44)
ANION GAP SERPL CALC-SCNC: 15 MMOL/L (ref 8–16)
ANION GAP SERPL CALC-SCNC: 18 MMOL/L (ref 8–16)
AST SERPL-CCNC: 16 U/L (ref 10–40)
BASOPHILS # BLD AUTO: 0.02 K/UL (ref 0–0.2)
BASOPHILS NFR BLD: 0.3 % (ref 0–1.9)
BILIRUB SERPL-MCNC: 0.2 MG/DL (ref 0.1–1)
BUN SERPL-MCNC: 10 MG/DL (ref 6–30)
BUN SERPL-MCNC: 9 MG/DL (ref 8–23)
CALCIUM SERPL-MCNC: 9.3 MG/DL (ref 8.7–10.5)
CHLORIDE SERPL-SCNC: 104 MMOL/L (ref 95–110)
CHLORIDE SERPL-SCNC: 106 MMOL/L (ref 95–110)
CHOLEST SERPL-MCNC: 246 MG/DL (ref 120–199)
CHOLEST/HDLC SERPL: 4.2 {RATIO} (ref 2–5)
CO2 SERPL-SCNC: 21 MMOL/L (ref 23–29)
CREAT SERPL-MCNC: 0.8 MG/DL (ref 0.5–1.4)
CREAT SERPL-MCNC: 0.9 MG/DL (ref 0.5–1.4)
CTP QC/QA: YES
DIFFERENTIAL METHOD: ABNORMAL
EOSINOPHIL # BLD AUTO: 0.2 K/UL (ref 0–0.5)
EOSINOPHIL NFR BLD: 3.4 % (ref 0–8)
ERYTHROCYTE [DISTWIDTH] IN BLOOD BY AUTOMATED COUNT: 14.8 % (ref 11.5–14.5)
EST. GFR  (NO RACE VARIABLE): >60 ML/MIN/1.73 M^2
ESTIMATED AVG GLUCOSE: 126 MG/DL (ref 68–131)
GLUCOSE SERPL-MCNC: 92 MG/DL (ref 70–110)
GLUCOSE SERPL-MCNC: 92 MG/DL (ref 70–110)
HBA1C MFR BLD: 6 % (ref 4–5.6)
HCT VFR BLD AUTO: 40.4 % (ref 37–48.5)
HCT VFR BLD CALC: 45 %PCV (ref 36–54)
HDLC SERPL-MCNC: 59 MG/DL (ref 40–75)
HDLC SERPL: 24 % (ref 20–50)
HGB BLD-MCNC: 13.4 G/DL (ref 12–16)
IMM GRANULOCYTES # BLD AUTO: 0.02 K/UL (ref 0–0.04)
IMM GRANULOCYTES NFR BLD AUTO: 0.3 % (ref 0–0.5)
INR PPP: 1 (ref 0.8–1.2)
LDLC SERPL CALC-MCNC: 170 MG/DL (ref 63–159)
LYMPHOCYTES # BLD AUTO: 2.7 K/UL (ref 1–4.8)
LYMPHOCYTES NFR BLD: 41.4 % (ref 18–48)
MAGNESIUM SERPL-MCNC: 2.2 MG/DL (ref 1.6–2.6)
MCH RBC QN AUTO: 29.1 PG (ref 27–31)
MCHC RBC AUTO-ENTMCNC: 33.2 G/DL (ref 32–36)
MCV RBC AUTO: 88 FL (ref 82–98)
MONOCYTES # BLD AUTO: 0.4 K/UL (ref 0.3–1)
MONOCYTES NFR BLD: 5.5 % (ref 4–15)
NEUTROPHILS # BLD AUTO: 3.2 K/UL (ref 1.8–7.7)
NEUTROPHILS NFR BLD: 49.1 % (ref 38–73)
NONHDLC SERPL-MCNC: 187 MG/DL
NRBC BLD-RTO: 0 /100 WBC
PHOSPHATE SERPL-MCNC: 4 MG/DL (ref 2.7–4.5)
PLATELET # BLD AUTO: 248 K/UL (ref 150–450)
PMV BLD AUTO: 10.4 FL (ref 9.2–12.9)
POC IONIZED CALCIUM: 1.18 MMOL/L (ref 1.06–1.42)
POC PTINR: 1.2 (ref 0.9–1.2)
POC PTWBT: 14 SEC (ref 9.7–14.3)
POC TCO2 (MEASURED): 25 MMOL/L (ref 23–29)
POCT GLUCOSE: 87 MG/DL (ref 70–110)
POTASSIUM BLD-SCNC: 3.1 MMOL/L (ref 3.5–5.1)
POTASSIUM SERPL-SCNC: 3.2 MMOL/L (ref 3.5–5.1)
PROT SERPL-MCNC: 8.4 G/DL (ref 6–8.4)
PROTHROMBIN TIME: 10.9 SEC (ref 9–12.5)
RBC # BLD AUTO: 4.61 M/UL (ref 4–5.4)
SAMPLE: ABNORMAL
SAMPLE: NORMAL
SARS-COV-2 RDRP RESP QL NAA+PROBE: NEGATIVE
SODIUM BLD-SCNC: 144 MMOL/L (ref 136–145)
SODIUM SERPL-SCNC: 142 MMOL/L (ref 136–145)
TRIGL SERPL-MCNC: 85 MG/DL (ref 30–150)
TSH SERPL DL<=0.005 MIU/L-ACNC: 1.9 UIU/ML (ref 0.4–4)
WBC # BLD AUTO: 6.49 K/UL (ref 3.9–12.7)

## 2022-12-30 PROCEDURE — 63600175 PHARM REV CODE 636 W HCPCS: Performed by: PHYSICIAN ASSISTANT

## 2022-12-30 PROCEDURE — 80053 COMPREHEN METABOLIC PANEL: CPT | Performed by: STUDENT IN AN ORGANIZED HEALTH CARE EDUCATION/TRAINING PROGRAM

## 2022-12-30 PROCEDURE — 83735 ASSAY OF MAGNESIUM: CPT | Performed by: STUDENT IN AN ORGANIZED HEALTH CARE EDUCATION/TRAINING PROGRAM

## 2022-12-30 PROCEDURE — 99223 PR INITIAL HOSPITAL CARE,LEVL III: ICD-10-PCS | Mod: AI,,, | Performed by: PSYCHIATRY & NEUROLOGY

## 2022-12-30 PROCEDURE — 93010 EKG 12-LEAD: ICD-10-PCS | Mod: ,,, | Performed by: INTERNAL MEDICINE

## 2022-12-30 PROCEDURE — 63600175 PHARM REV CODE 636 W HCPCS: Performed by: STUDENT IN AN ORGANIZED HEALTH CARE EDUCATION/TRAINING PROGRAM

## 2022-12-30 PROCEDURE — 82962 GLUCOSE BLOOD TEST: CPT

## 2022-12-30 PROCEDURE — 94761 N-INVAS EAR/PLS OXIMETRY MLT: CPT

## 2022-12-30 PROCEDURE — 82803 BLOOD GASES ANY COMBINATION: CPT

## 2022-12-30 PROCEDURE — 84100 ASSAY OF PHOSPHORUS: CPT | Performed by: STUDENT IN AN ORGANIZED HEALTH CARE EDUCATION/TRAINING PROGRAM

## 2022-12-30 PROCEDURE — 84295 ASSAY OF SERUM SODIUM: CPT

## 2022-12-30 PROCEDURE — G0425 PR INPT TELEHEALTH CONSULT 30M: ICD-10-PCS | Mod: 95,,, | Performed by: STUDENT IN AN ORGANIZED HEALTH CARE EDUCATION/TRAINING PROGRAM

## 2022-12-30 PROCEDURE — 87635 SARS-COV-2 COVID-19 AMP PRB: CPT | Performed by: STUDENT IN AN ORGANIZED HEALTH CARE EDUCATION/TRAINING PROGRAM

## 2022-12-30 PROCEDURE — 84443 ASSAY THYROID STIM HORMONE: CPT | Performed by: STUDENT IN AN ORGANIZED HEALTH CARE EDUCATION/TRAINING PROGRAM

## 2022-12-30 PROCEDURE — 93010 ELECTROCARDIOGRAM REPORT: CPT | Mod: ,,, | Performed by: INTERNAL MEDICINE

## 2022-12-30 PROCEDURE — 25000003 PHARM REV CODE 250: Performed by: STUDENT IN AN ORGANIZED HEALTH CARE EDUCATION/TRAINING PROGRAM

## 2022-12-30 PROCEDURE — 25500020 PHARM REV CODE 255: Performed by: STUDENT IN AN ORGANIZED HEALTH CARE EDUCATION/TRAINING PROGRAM

## 2022-12-30 PROCEDURE — 85610 PROTHROMBIN TIME: CPT | Performed by: STUDENT IN AN ORGANIZED HEALTH CARE EDUCATION/TRAINING PROGRAM

## 2022-12-30 PROCEDURE — 99223 1ST HOSP IP/OBS HIGH 75: CPT | Mod: AI,,, | Performed by: PSYCHIATRY & NEUROLOGY

## 2022-12-30 PROCEDURE — G0425 INPT/ED TELECONSULT30: HCPCS | Mod: 95,,, | Performed by: STUDENT IN AN ORGANIZED HEALTH CARE EDUCATION/TRAINING PROGRAM

## 2022-12-30 PROCEDURE — 12000002 HC ACUTE/MED SURGE SEMI-PRIVATE ROOM

## 2022-12-30 PROCEDURE — 96365 THER/PROPH/DIAG IV INF INIT: CPT

## 2022-12-30 PROCEDURE — 83036 HEMOGLOBIN GLYCOSYLATED A1C: CPT | Performed by: PHYSICIAN ASSISTANT

## 2022-12-30 PROCEDURE — 96375 TX/PRO/DX INJ NEW DRUG ADDON: CPT

## 2022-12-30 PROCEDURE — 85025 COMPLETE CBC W/AUTO DIFF WBC: CPT | Performed by: STUDENT IN AN ORGANIZED HEALTH CARE EDUCATION/TRAINING PROGRAM

## 2022-12-30 PROCEDURE — 80048 BASIC METABOLIC PNL TOTAL CA: CPT | Mod: XB

## 2022-12-30 PROCEDURE — 84132 ASSAY OF SERUM POTASSIUM: CPT

## 2022-12-30 PROCEDURE — 25000003 PHARM REV CODE 250: Performed by: NURSE PRACTITIONER

## 2022-12-30 PROCEDURE — 99900035 HC TECH TIME PER 15 MIN (STAT)

## 2022-12-30 PROCEDURE — 82565 ASSAY OF CREATININE: CPT

## 2022-12-30 PROCEDURE — 83605 ASSAY OF LACTIC ACID: CPT

## 2022-12-30 PROCEDURE — 80061 LIPID PANEL: CPT | Performed by: STUDENT IN AN ORGANIZED HEALTH CARE EDUCATION/TRAINING PROGRAM

## 2022-12-30 PROCEDURE — 93005 ELECTROCARDIOGRAM TRACING: CPT

## 2022-12-30 PROCEDURE — 82330 ASSAY OF CALCIUM: CPT

## 2022-12-30 PROCEDURE — 99285 EMERGENCY DEPT VISIT HI MDM: CPT | Mod: 25

## 2022-12-30 PROCEDURE — 27000221 HC OXYGEN, UP TO 24 HOURS

## 2022-12-30 RX ORDER — ALBUTEROL SULFATE 90 UG/1
2 POWDER, METERED RESPIRATORY (INHALATION) EVERY 6 HOURS PRN
Status: ON HOLD | COMMUNITY
Start: 2022-03-03 | End: 2023-01-05 | Stop reason: HOSPADM

## 2022-12-30 RX ORDER — LABETALOL HCL 20 MG/4 ML
10 SYRINGE (ML) INTRAVENOUS EVERY 4 HOURS PRN
Status: DISCONTINUED | OUTPATIENT
Start: 2022-12-30 | End: 2023-01-06 | Stop reason: HOSPADM

## 2022-12-30 RX ORDER — HEPARIN SODIUM 5000 [USP'U]/ML
5000 INJECTION, SOLUTION INTRAVENOUS; SUBCUTANEOUS EVERY 8 HOURS
Status: DISCONTINUED | OUTPATIENT
Start: 2022-12-30 | End: 2023-01-06 | Stop reason: HOSPADM

## 2022-12-30 RX ORDER — VALSARTAN 160 MG/1
160 TABLET ORAL DAILY
COMMUNITY
Start: 2022-10-20

## 2022-12-30 RX ORDER — SODIUM CHLORIDE 0.9 % (FLUSH) 0.9 %
10 SYRINGE (ML) INJECTION
Status: DISCONTINUED | OUTPATIENT
Start: 2022-12-30 | End: 2023-01-06 | Stop reason: HOSPADM

## 2022-12-30 RX ORDER — ALBUTEROL SULFATE 90 UG/1
2 AEROSOL, METERED RESPIRATORY (INHALATION) EVERY 6 HOURS PRN
Status: DISCONTINUED | OUTPATIENT
Start: 2022-12-30 | End: 2023-01-06 | Stop reason: HOSPADM

## 2022-12-30 RX ORDER — HYDROMORPHONE HYDROCHLORIDE 1 MG/ML
0.5 INJECTION, SOLUTION INTRAMUSCULAR; INTRAVENOUS; SUBCUTANEOUS
Status: COMPLETED | OUTPATIENT
Start: 2022-12-30 | End: 2022-12-30

## 2022-12-30 RX ORDER — HYDROCHLOROTHIAZIDE 25 MG/1
25 TABLET ORAL DAILY
COMMUNITY
Start: 2022-11-11 | End: 2023-02-09

## 2022-12-30 RX ORDER — ASPIRIN 81 MG/1
81 TABLET ORAL DAILY
COMMUNITY

## 2022-12-30 RX ORDER — ASPIRIN 325 MG
325 TABLET ORAL
Status: COMPLETED | OUTPATIENT
Start: 2022-12-30 | End: 2022-12-30

## 2022-12-30 RX ORDER — CLOPIDOGREL 300 MG/1
600 TABLET, FILM COATED ORAL
Status: DISCONTINUED | OUTPATIENT
Start: 2022-12-30 | End: 2022-12-30

## 2022-12-30 RX ORDER — CLOPIDOGREL BISULFATE 75 MG/1
75 TABLET ORAL DAILY
Status: DISCONTINUED | OUTPATIENT
Start: 2022-12-31 | End: 2023-01-06 | Stop reason: HOSPADM

## 2022-12-30 RX ORDER — TALC
6 POWDER (GRAM) TOPICAL NIGHTLY PRN
Status: DISCONTINUED | OUTPATIENT
Start: 2022-12-30 | End: 2023-01-06 | Stop reason: HOSPADM

## 2022-12-30 RX ORDER — ERGOCALCIFEROL 1.25 MG/1
50000 CAPSULE ORAL WEEKLY
COMMUNITY
Start: 2022-03-03

## 2022-12-30 RX ORDER — POTASSIUM CHLORIDE 7.45 MG/ML
10 INJECTION INTRAVENOUS ONCE
Status: COMPLETED | OUTPATIENT
Start: 2022-12-30 | End: 2022-12-30

## 2022-12-30 RX ORDER — CLOPIDOGREL 300 MG/1
300 TABLET, FILM COATED ORAL
Status: COMPLETED | OUTPATIENT
Start: 2022-12-30 | End: 2022-12-30

## 2022-12-30 RX ORDER — LEVOTHYROXINE SODIUM 50 UG/1
50 TABLET ORAL
Status: DISCONTINUED | OUTPATIENT
Start: 2022-12-31 | End: 2023-01-06 | Stop reason: HOSPADM

## 2022-12-30 RX ORDER — FLUOXETINE HYDROCHLORIDE 20 MG/1
20 CAPSULE ORAL DAILY
Status: DISCONTINUED | OUTPATIENT
Start: 2022-12-31 | End: 2023-01-06 | Stop reason: HOSPADM

## 2022-12-30 RX ORDER — ATORVASTATIN CALCIUM 20 MG/1
40 TABLET, FILM COATED ORAL DAILY
Status: DISCONTINUED | OUTPATIENT
Start: 2022-12-31 | End: 2023-01-06 | Stop reason: HOSPADM

## 2022-12-30 RX ORDER — MELOXICAM 15 MG/1
15 TABLET ORAL DAILY
Status: ON HOLD | COMMUNITY
Start: 2022-03-03 | End: 2023-01-05 | Stop reason: HOSPADM

## 2022-12-30 RX ORDER — HYDRALAZINE HYDROCHLORIDE 20 MG/ML
10 INJECTION INTRAMUSCULAR; INTRAVENOUS ONCE
Status: COMPLETED | OUTPATIENT
Start: 2022-12-31 | End: 2022-12-31

## 2022-12-30 RX ORDER — ASPIRIN 81 MG/1
81 TABLET ORAL DAILY
Status: DISCONTINUED | OUTPATIENT
Start: 2022-12-31 | End: 2023-01-06 | Stop reason: HOSPADM

## 2022-12-30 RX ORDER — LABETALOL HCL 20 MG/4 ML
10 SYRINGE (ML) INTRAVENOUS
Status: DISCONTINUED | OUTPATIENT
Start: 2022-12-30 | End: 2022-12-30

## 2022-12-30 RX ORDER — ASPIRIN 81 MG/1
81 TABLET ORAL 2 TIMES DAILY
Status: DISCONTINUED | OUTPATIENT
Start: 2022-12-30 | End: 2022-12-30

## 2022-12-30 RX ADMIN — IOHEXOL 85 ML: 350 INJECTION, SOLUTION INTRAVENOUS at 05:12

## 2022-12-30 RX ADMIN — HEPARIN SODIUM 5000 UNITS: 5000 INJECTION INTRAVENOUS; SUBCUTANEOUS at 10:12

## 2022-12-30 RX ADMIN — ASPIRIN 325 MG ORAL TABLET 325 MG: 325 PILL ORAL at 06:12

## 2022-12-30 RX ADMIN — LABETALOL HYDROCHLORIDE 10 MG: 5 INJECTION, SOLUTION INTRAVENOUS at 10:12

## 2022-12-30 RX ADMIN — CLOPIDOGREL BISULFATE 300 MG: 300 TABLET, FILM COATED ORAL at 06:12

## 2022-12-30 RX ADMIN — POTASSIUM CHLORIDE 10 MEQ: 7.46 INJECTION, SOLUTION INTRAVENOUS at 06:12

## 2022-12-30 RX ADMIN — HYDROMORPHONE HYDROCHLORIDE 0.5 MG: 1 INJECTION, SOLUTION INTRAMUSCULAR; INTRAVENOUS; SUBCUTANEOUS at 07:12

## 2022-12-30 NOTE — CONSULTS
"      [facility]  Vascular Neurology  Comprehensive Stroke Center  TeleVascular Neurology Acute Consultation Note      Consult to Telemedicine - Acute Stroke  Consult performed by: Mariaa Bower MD  Consult ordered by: Ernestine Jiang DO        Consulting Provider: ERNESTINE JIANG  Current Providers  No providers found    Patient Location:  [facility] EMERGENCY DEPARTMENT Emergency Department  Spoke hospital nurse at bedside with patient assisting consultant.     Patient information was obtained from patient, EMS personnel, past medical records, and ER records.         Assessment/Plan:       Diagnoses:   * Cerebral infarction due to embolism of left middle cerebral artery  73 yo female w/ PMHx of HTN (medication  non-adherence), DM II (medication non-adherence), Obesity  who presents w/ abnormal behavior  starting at 13:30 as per EMS.   She later developed chest and abdominal pain and HA, and this prompted her family to call the EMS.   Patient reportedly was not able to hold the conversation well at home and was "confused" as per EMS, but no family at bedside to comment more about symptoms a    NIHSS 5. Hypodensity in the L insurla cortex noted on CTH.   Not a TNK candidate based on CTH findings.     Agree with CTA H/N once IV access is established to ensure there is not a proximal occlusion although suspect a distal vessel thrombosis based on CTH findings.     Antithrombotics for secondary stroke prevention: Antiplatelets: Aspirin: 325 mg daily  Clopidogrel: 300 mg loading dose x 1, now   On Dec 31 start ASA 81 mg and Plavix 75 mg daily x 30 days total, w/ ASA 81 mg monotherapy thereafter unless AC is indicated once stroke w/u is completed.     Statins for secondary stroke prevention and hyperlipidemia, if present:   Statins: Atorvastatin- 40 mg daily, LDL goal <70 long term     Aggressive risk factor modification: HTN, DM, HLD, Diet, Exercise, Obesity     Rehab " efforts: The patient has been evaluated by a stroke team provider and the therapy needs have been fully considered based off the presenting complaints and exam findings. The following therapy evaluations are needed: PT evaluate and treat, OT evaluate and treat, SLP evaluate and treat, PM&R evaluate for appropriate placement    Diagnostics ordered/pending: CTA Head to assess vasculature , CTA Neck/Arch to assess vasculature, HgbA1C to assess blood glucose levels, Lipid Profile to assess cholesterol levels, MRI head without contrast to assess brain parenchyma, TTE to assess cardiac function/status , TSH to assess thyroid function    VTE prophylaxis: Heparin 5000 units SQ every 8 hours    BP parameters: Infarct: No intervention, SBP <220            STROKE DOCUMENTATION     Acute Stroke Times:   Acute Stroke Times   Last Known Normal Date: 12/30/22  Last Known Normal Time: 1330  Symptom Onset Date: 12/30/22  Symptom Onset Time: 1330  Stroke Team Called Date: 12/30/22  Stroke Team Called Time: 1526  Stroke Team Arrival Date: 12/30/22  Stroke Team Arrival Time: 1528 (Patient not in room, spoke w/ ER physician and EMS, requested call back when patient is in the room. Received call ball at 16:00 that patient back in room)  CT Interpretation Time: 1538  Thrombolytic Therapy Recommended: No  CTA Interpretation Time:  (Pending, patient lost access on initial attempt)    NIH Scale:  1a. Level of Consciousness: 1-->Not alert, but arousable by minor stimulation to obey, answer, or respond  1b. LOC Questions: 1-->Answers one question correctly  1c. LOC Commands: 0-->Performs both tasks correctly  2. Best Gaze: 0-->Normal  3. Visual: 0-->No visual loss  4. Facial Palsy: 1-->Minor paralysis (flattened nasolabial fold, asymmetry on smiling)  5a. Motor Arm, Left: 0-->No drift, limb holds 90 (or 45) degrees for full 10 secs  5b. Motor Arm, Right: 1-->Drift, limb holds 90 (or 45) degrees, but drifts down before full 10 secs, does not  "hit bed or other support  6a. Motor Leg, Left: 0-->No drift, leg holds 30 degree position for full 5 secs  6b. Motor Leg, Right: 0-->No drift, leg holds 30 degree position for full 5 secs  7. Limb Ataxia: 0-->Absent  8. Sensory: 0-->Normal, no sensory loss  9. Best Language: 1-->Mild-to-moderate aphasia, some obvious loss of fluency or facility of comprehension, without significant limitation on ideas expressed or form of expression. Reduction of speech and/or comprehension, however, makes conversation. . . (see row details)  10. Dysarthria: 0-->Normal  11. Extinction and Inattention (formerly Neglect): 0-->No abnormality  Total (NIH Stroke Scale): 5     Modified Meeker Score: 2  [place] Coma Scale:    ABCD2 Score:    [id number] Score:   HAS -BLED Score:   ICH Score:   Hunt & Ulloa Classification:       Blood pressure (!) 186/86, pulse 83, temperature 97.7 °F (36.5 °C), temperature source Oral, resp. rate 16, height 5' 10" (1.778 m), weight 108.9 kg (240 lb), SpO2 98 %.  Eligible for thrombolytic therapy?: Yes  Thrombolytic therapy recomended: Thrombolytic therapy not recommended due to Hypodensity on CT   Possible Interventional Revascularization Candidate? Awaiting CTA results from Spoke for determination     Disposition Recommendation: pending further studies    Subjective:     History of Present Illness:  71 yo female w/ PMHx of HTN (medication  non-adherence), DM II (medication non-adherence), Obesity  who presents w/ abnormal behavior  starting at 13:30 as per EMS.   She later developed chest and abdominal pain and HA, and this prompted her family to call the EMS.   Patient reportedly was not able to hold the conversation well at home and was "confused" as per EMS, but no family at bedside to comment more about symptoms and symptom onset.   BP on arrival 201/95  BG  as per EMS  - 108              Woke up with symptoms?: yes    Recent bleeding noted: no  Does the patient take any Blood Thinners? " "no  Medications: No relevant medications      Past Medical History: hypertension, diabetes and Obesity    Past Surgical History: no relevant surgical history    Family History: no relevant history    Social History: no smoking, no drinking, no drugs    Allergies: No Known Allergies No relevant allergies    Review of Systems   Constitutional: Positive for activity change.   HENT: Positive for voice change. Negative for trouble swallowing.    Eyes: Negative for visual disturbance.   Respiratory: Positive for chest tightness. Negative for shortness of breath.    Cardiovascular: Positive for chest pain.   Gastrointestinal: Positive for abdominal pain. Negative for nausea and vomiting.   Endocrine: Negative.    Genitourinary: Negative.    Musculoskeletal: Negative.  Negative for gait problem.   Skin: Negative.    Neurological: Positive for facial asymmetry, speech difficulty, weakness and headaches. Negative for dizziness and numbness.   Psychiatric/Behavioral: Positive for confusion and decreased concentration.     Objective:   Vitals: Blood pressure (!) 186/86, pulse 83, temperature 97.7 °F (36.5 °C), temperature source Oral, resp. rate 16, height 5' 10" (1.778 m), weight 108.9 kg (240 lb), SpO2 98 %.     CT READ: Yes  Abnormal CT L insular hypodensity noted consistent w/ acute infarct .     Physical Exam  Constitutional:       Appearance: She is obese. She is ill-appearing.   HENT:      Head: Atraumatic.      Comments: RFD noted   Eyes:      Extraocular Movements: Extraocular movements intact.   Cardiovascular:      Rate and Rhythm: Normal rate and regular rhythm.   Pulmonary:      Effort: Pulmonary effort is normal.   Musculoskeletal:         General: Normal range of motion.   Neurological:      Mental Status: She is disoriented.      Cranial Nerves: Cranial nerve deficit present.      Sensory: No sensory deficit.      Motor: Weakness present.      Comments: RFD noted  RUE drift   No RLE drift noted  No sensory " deficits  Able to name objects on the NIHSS cards, but communicates in fragmented sentences  Unable to state her age              Recommended the emergency room physician to have a brief discussion with the patient and/or family if available regarding the  risks and benefits of treatment, and to briefly document the occurrence of that discussion in his clinical encounter note.     The attending portion of this evaluation, treatment, and documentation was performed per Mariaa Bower MD via audiovisual.    Billing code:  (moderate to severe stroke, large areas of edema, some mimics)      This patient has a critical neurological condition/illness, with high morbidity and mortality.  There is a high probability for acute neurological change leading to clinical and possibly life-threatening deterioration requiring highest level of physician preparedness for urgent intervention.  Care was coordinated with other physicians involved in the patient's care.  Radiologic studies and laboratory data were reviewed and interpreted, and plan of care was re-assessed based on the results.  Diagnosis, treatment options and prognosis may have been discussed with the patient and/or family members or caregiver.  Further advanced medical management and further evaluation is warranted for his care.    In your opinion, this was a: Tier 1 Van Positive    Consult End Time: 4:34 PM     Mariaa Bower MD  Comprehensive Stroke Center  Vascular Neurology   [facility]

## 2022-12-30 NOTE — HPI
Ms. Khan is a 73 yo F with PMH of HTN, DM, obesity, and noncompliance with medications. Patient was noted to have abnormal behavior around 1:30pm today. This was followed by chest pain, abdominal pain, and HA. Family called EMS, and patient was taken to [facility] ED. SBP on arrival 201. Patient was seen via Telestroke by Dr. Bower. NIHSS at that 5. CTH with hypodensity in L insular cortex. Not eligible for TNK. CTA H/N with L KEITH mid to distal A2 occlusion. Patient transferred to [facility] for possible intervention and higher level of care. On arrival patient with NIHSS of 8. Mild dysarthria and RLE weakness noted (new from WB exam). Of note patient received 0.5mg of Dilaudid at OSH prior to arrival.

## 2022-12-30 NOTE — ED PROVIDER NOTES
Encounter Date: 12/30/2022    SCRIBE #1 NOTE: I, Holley Mitchell, am scribing for, and in the presence of,  Ernestine Stevenson DO. I have scribed the following portions of the note - Other sections scribed: HPI, ROS, PE, MDM.     History     Chief Complaint   Patient presents with    Cerebrovascular Accident     Pt reports to the ED with C/O AMS and aphasia that started at 1330 today. Pt is confused but converse. Pt stroke activated on arrival and sent to CT. Pt also reports chest and abdominal pain.      Nicolás Khan is a 72 y.o. female with a PMHx of HTN, DM, COPD, GERD, HLD, who presents to the ED c/o sudden onset of aphasia that began at 1330. Per EMS, pt's family called EMS due to the pt's chest pain abdominal pain. On arrival, pt was at at GCS 14. Per family, pt's baseline is GCS 15. No weakness on initial EMS exam. Per EMS, pt's last vitals include bp 201/95, hr 83, bg 108, spO2 96% on room air. Pt is on medications for DM and HTN, but has been non-compliant. No other exacerbating or alleviating factors.         The history is provided by the EMS personnel.   Review of patient's allergies indicates:  No Known Allergies  Past Medical History:   Diagnosis Date    COPD (chronic obstructive pulmonary disease)     Diabetes mellitus     GERD (gastroesophageal reflux disease)     Hyperlipemia, mixed     Hypertension     Major depression     Obesity     Osteoarthritis     Thyroid disease     Tobacco abuse     Urinary incontinence, functional     Vitamin D deficiency      Past Surgical History:   Procedure Laterality Date    HYSTERECTOMY       No family history on file.  Social History     Tobacco Use    Smoking status: Every Day     Packs/day: 1.00     Types: Cigarettes    Smokeless tobacco: Never   Substance Use Topics    Alcohol use: Yes     Review of Systems   Unable to perform ROS: Mental status change   Cardiovascular:  Positive for chest pain.   Gastrointestinal:  Positive for abdominal pain.    Neurological:  Positive for speech difficulty (at 1330) and weakness.     Physical Exam     Initial Vitals   BP Pulse Resp Temp SpO2   12/30/22 1526 12/30/22 1526 12/30/22 1526 12/30/22 1612 12/30/22 1526   (!) 201/95 83 16 97.7 °F (36.5 °C) 98 %      MAP       --                Physical Exam    Nursing note and vitals reviewed.  Constitutional: She appears well-developed. She is not diaphoretic. She is Obese .   Decreased level of consciousness however easily arousable.  Hypertensive.  Chronically ill-appearing   HENT:   Head: Normocephalic.   Right Ear: External ear normal.   Left Ear: External ear normal.   Nose: Nose normal.   Mouth/Throat: Oropharynx is clear and moist.   Eyes: Conjunctivae are normal.   Cardiovascular:  Normal rate and regular rhythm.     Exam reveals no gallop and no friction rub.       No murmur heard.  Pulmonary/Chest: Breath sounds normal. She has no wheezes. She has no rhonchi. She has no rales.   Abdominal: Abdomen is soft. Bowel sounds are normal. She exhibits no distension. There is no abdominal tenderness. There is no rebound, no guarding, no tenderness at McBurney's point and negative Wooten's sign.   Musculoskeletal:         General: Normal range of motion.     Lymphadenopathy:     She has no cervical adenopathy.   Neurological: She displays no atrophy and no tremor. No sensory deficit. She exhibits normal muscle tone. She displays no seizure activity. GCS eye subscore is 4. GCS verbal subscore is 4. GCS motor subscore is 6.   Intermittently following commands, moves all extremities.    Skin: Skin is warm and dry.   Psychiatric: She has a normal mood and affect.       ED Course   Critical Care    Date/Time: 12/30/2022 9:01 PM  Performed by: Ernestine Stevenson DO  Authorized by: Ernestine Stevenson DO   Direct patient critical care time: 40 minutes  Additional history critical care time: 10 minutes  Ordering / reviewing critical care time: 10 minutes  Documentation  critical care time: 10 minutes  Consulting other physicians critical care time: 15 minutes  Consult with family critical care time: 10 minutes  Total critical care time (exclusive of procedural time) : 95 minutes  Critical care time was exclusive of separately billable procedures and treating other patients and teaching time.  Critical care was necessary to treat or prevent imminent or life-threatening deterioration of the following conditions: CNS failure or compromise.  Critical care was time spent personally by me on the following activities: development of treatment plan with patient or surrogate, discussions with consultants, interpretation of cardiac output measurements, evaluation of patient's response to treatment, examination of patient, obtaining history from patient or surrogate, ordering and performing treatments and interventions, ordering and review of laboratory studies, ordering and review of radiographic studies, pulse oximetry, re-evaluation of patient's condition and review of old charts.      Labs Reviewed   CBC W/ AUTO DIFFERENTIAL - Abnormal; Notable for the following components:       Result Value    RDW 14.8 (*)     All other components within normal limits   COMPREHENSIVE METABOLIC PANEL - Abnormal; Notable for the following components:    Potassium 3.2 (*)     CO2 21 (*)     All other components within normal limits   LIPID PANEL - Abnormal; Notable for the following components:    Cholesterol 246 (*)     LDL Cholesterol 170.0 (*)     All other components within normal limits   ISTAT PROCEDURE - Abnormal; Notable for the following components:    POC Potassium 3.1 (*)     POC Anion Gap 18 (*)     All other components within normal limits   PROTIME-INR   TSH   MAGNESIUM   PHOSPHORUS   PHOSPHORUS   MAGNESIUM   SARS-COV-2 RDRP GENE   POCT GLUCOSE, HAND-HELD DEVICE   POCT GLUCOSE   ISTAT PROCEDURE   ISTAT CHEM8   POCT PROTIME-INR     EKG Readings: (Independently Interpreted)   Normal sinus  rhythm with a rate of 83 beats per minute, biatrial enlargement, prolonged QTC at 533 ms.  Q-waves in V1 through V4 T-wave inversions in 1, aVL as well as V1.  No obvious acute ST segment changes.  Prolonged QTC new when compared to previous EKG from July 2021     Imaging Results               CTA Head and Neck (xpd) (Final result)  Result time 12/30/22 18:19:23      Final result by Hubert Michelle MD (12/30/22 18:19:23)                   Impression:      1. Left anterior cerebral artery mid to distal A2 occlusion.  If clinical concern for acute infarction, further evaluation may be obtained with MRI.  2. CTA head and neck with no additional large vessel occlusion or dissection.  This report was flagged in Epic as abnormal.      Electronically signed by: Hubert Michelle MD  Date:    12/30/2022  Time:    18:19               Narrative:    EXAMINATION:  CTA HEAD AND NECK (XPD)    CLINICAL HISTORY:  Stroke/TIA, determine embolic source;    TECHNIQUE:  CT angiogram was performed from the level of the meghan to the top of the head following the IV administration of 85mL of Omnipaque 350.   Sagittal and coronal reconstructions and maximum intensity projection reconstructions were performed. Arterial stenosis percentages are based on NASCET measurement criteria.    COMPARISON:  Noncontrast CT head from the same date at 15:29.    FINDINGS:  Redemonstration of parenchymal hypoattenuation within the left frontal lobe as seen on earlier noncontrast CT.  No evidence of abnormal parenchymal contrast enhancement.    CTA Neck: The origins of the right brachiocephalic, left common carotid and left subclavian arteries from the arch are within normal limits.  The origins of the vertebral arteries are within normal limits.  The vertebral arteries are unremarkable throughout their course without evidence for focal stenosis or occlusion.   The bilateral common carotid arteries and internal carotid arteries are patent without evidence  for focal stenosis or occlusion.  There is medial deviated course of the right ICA.  No evidence of dissection involving the bilateral carotid and vertebral arteries.    Anterior circulation: The bilateral distal cervical, petrous, cavernous, and supraclinoid ICAs are patent without significant stenosis or aneurysm.  Atherosclerotic plaquing of the cavernous segments of the ICAs.  The right anterior cerebral artery and bilateral middle cerebral arteries are patent without significant stenosis, occlusion, or aneurysm.  Narrowing and attenuation are seen of the proximal left anterior cerebral artery A2 segment with occlusion seen distally.    Posterior circulation: Distal vertebral arteries, basilar artery and posterior cerebral arteries are patent without significant focal stenosis, occlusion, or aneurysm.  Prominent left-sided posterior communicating artery is seen which may supply majority of flow to the left PCA.    Airways: Unremarkable.    Glands/Nodes: The parotid, submandibular, and thyroid glands are unremarkable.    Spine: No acute osseous abnormality identified.  Multilevel cervical spine anterior spurring is seen.    Lungs: Visualized lung apices are clear.                                        CT Head Without Contrast (Final result)  Result time 12/30/22 15:58:15      Final result by Mango Santos MD (12/30/22 15:58:15)                   Impression:      Findings concerning for recent infarction involving the left frontal lobe and insular ribbon, as above.  No intracranial hemorrhage.    Sequela of mild senescent and chronic microvascular ischemic change with stable left caudate remote lacunar type infarct.    This report was flagged in Epic as abnormal.    COMMUNICATION  This critical result was discovered/received at 1537 hours.  The critical information above was relayed directly by me by telephone to Dr. Yeison Spicer in the emergency department on 12/30/2022 at 15:45 hours.      Electronically  signed by: Mango Santos MD  Date:    12/30/2022  Time:    15:58               Narrative:    EXAMINATION:  CT HEAD WITHOUT CONTRAST    CLINICAL HISTORY:  Neuro deficit, acute, stroke suspected;    TECHNIQUE:  Low dose axial CT images obtained throughout the head without intravenous contrast. Sagittal and coronal reconstructions were performed.    COMPARISON:  Head CT 06/17/2021    FINDINGS:  Intracranial compartment:    Age-related mild generalized cerebral volume loss.  Ventricles are midline and stable in size and configuration without distortion by mass effect or acute hydrocephalus.  No extra-axial blood or fluid collections.    New area of hypoattenuation involving the subcortical white matter and cortex of the posterior and inferior left frontal lobe and also anterior aspect of the left insular ribbon with mild effacement of the associated cortical sulci consistent with recent infarct, likely acute.  No associated hemorrhagic component.    Left caudate small remote lacunar type infarct unchanged.  Grossly stable mild periventricular white matter elsewhere consistent with chronic microvascular ischemic change.  No parenchymal mass, hemorrhage, edema or midline shift.  Skull base atherosclerotic vascular calcifications noted.    Skull/extracranial contents (limited evaluation): No fracture. Hyperostosis frontalis interna.  Mastoid air cells are clear.  Mild patchy mucosal thickening of the paranasal sinuses without air-fluid levels.                                       Medications   aspirin tablet 325 mg (325 mg Oral Given 12/30/22 1806)   potassium chloride 10 mEq in 100 mL IVPB (0 mEq Intravenous Stopped 12/30/22 1925)   clopidogreL tablet 300 mg (300 mg Oral Given 12/30/22 1806)   iohexoL (OMNIPAQUE 350) injection 85 mL (85 mLs Intravenous Given 12/30/22 1755)   HYDROmorphone injection 0.5 mg (0.5 mg Intravenous Given 12/30/22 1948)     Medical Decision Making:   Clinical Tests:   Lab Tests: Ordered and  Reviewed  Radiological Study: Ordered and Reviewed  Medical Tests: Ordered and Reviewed  ED Management:   [facility]  This is an emergent evaluation of a 72 y.o. female with a PMHx of HTN, DM, COPD, GERD, HLD, who presents to the ED c/o sudden onset of aphasia that began at 1330. Initial vitals in the ED  [12/30/22 1526]  BP: (!) 201/95  Pulse: 83  Resp: 16  Temp: n/a  SpO2: 98 % .     Physical exam noted above. DDx includes but is not limited to stroke, hypertensive emergency, hyperglycemia vs DKA, ACS, UTI, electrolyte abnormality. Also considered but clinically less likely to be dissection, PE. Will obtain labs and imaging including EMS triage stroke code was called and will obtain labs per stroke protocol. Case was discussed with Dr. Bower, vascular Neurology who recommended no thrombolytics at this time.  She did however recommend a CTA.  There was initially a delay in obtaining CTA given no IV access.  Will continue to monitor and frequently reassess pending results of labs, treatments and final disposition.    Patient is aware of plan and is amenable.     Ernestine Stevenson D.O  EMERGENCY MEDICINE  3:48 PM 12/30/2022    UPDATE:   Initial CT scan shows findings concerning for recent infarct involving the left frontal lobe and insular ribbon.  Findings were discussed with vascular Neurology, Dr. Bower at 1601. She states patient will still need CTA.  Also recommended patient be treated with full-dose Plavix and aspirin.      This chart was completed using dictation software, as a result there may be some transcription errors    UPDATE:  CTA showed a left anterior cerebral artery mid to distal A2 occlusion.  No evidence of a large vessel occlusion or dissection.  Case was discussed with Dr. Delarosa, vascular Neurology, via the transfer center.  She recommended that patient have a stat MRI at that time.  However, per transfer center, given [facility] incapable of obtaining an MRI at this time, the new protocol  was to [place] [facility] [place].    Ernestine Stevenson DO  18:45PM     UPDATE  Upon further discussion with vascular Neurology, Dr. Delarosa, patient possibly needs acute intervention.  She recommended patient be transported to [facility] to have her MRI despite [facility] being on diversion.  Patient to be transported ED to ED.    Ernestine Stevenson D.O  EMERGENCY MEDICINE   7:07 PM 12/30/2022    UPDATE  Per transfer sooner, patient accepted by Dr. Appiah, neuro critical Care.  Transport being arranged.  Patient family at bedside and updated on plan.  Patient also complaining of shoulder and foot pain.  She was treated with IV pain medication.  Repeat vitals with improved blood pressure.  Remainder vitals also reassuring.  GCS 14 given 1 off for intermittent confusion.    Ernestine Stevenson D.O  EMERGENCY MEDICINE   7:40 PM 12/30/2022     Additional MDM:     NIH Stroke Scale:   Interval = baseline (upon arrival/admit)  Level of consciousness = 1 - drowsy  LOC questions = 1 - answers one correctly  LOC commands = 0 - performs both correctly  Best gaze = 0 - normal  Visual = 0 - no visual loss  Facial palsy = 1 - minor  Motor left arm =  0 - no drift  Motor right arm =  1 - drift  Motor left leg = 0 - no drift  Motor right leg =  0 - no drift  Limb ataxia = 0 - absent  Sensory = 0 - normal  Best language = 1 - mild to moderate aphasia  Dysarthria = 0 - normal articulation  Extinction and inattention = 0 - no neglect  NIH Stroke Scale Total = 5     Scribe Attestation:   Scribe #1: I performed the above scribed service and the documentation accurately describes the services I performed. I attest to the accuracy of the note.                   Clinical Impression:   Final diagnoses:  [I63.9] Stroke (Primary)        ED Disposition Condition    Transfer to Another Facility Stable             I, Ernestine Stevenson DO, personally performed the services  described in this documentation. All medical record entries made by the scribe were at my direction and in my presence. I have reviewed the chart and agree that the record reflects my personal performance and is accurate and complete.       Ernestine Stevenson, DO  12/30/22 9719

## 2022-12-30 NOTE — ASSESSMENT & PLAN NOTE
71 yo F with PMH of HTN, DM, obesity, and noncompliance with medications. Patient was noted to have abnormal behavior around 1:30pm today. This was followed by chest pain, abdominal pain, and HA. Family called EMS, and patient was taken to [facility] ED. SBP on arrival 201. Patient was seen via Telestroke by Dr. Bower. NIHSS at that 5. CTH with hypodensity in L insular cortex. Not eligible for TNK. CTA H/N with L KEITH mid to distal A2 occlusion. Patient transferred to [facility] for possible intervention and higher level of care.     On arrival NIHSS 8. New RLE weakness and dysarthria from previous NIHSS 5 at OSH. Of note patient was given dilaudid prior to transfer. MRI ischemic with L MCA infarct. IR reviewed imaging, possible chronic L A2 occlusion. Not IR candidate. Will be admitted to NPU.     Antithrombotics for secondary stroke prevention: Antiplatelets: Aspirin: 81 mg daily  Clopidogrel: 75 mg daily (loaded with DAPT at OSH)     Statins for secondary stroke prevention and hyperlipidemia, if present:   Statins: Atorvastatin- 40 mg daily, LDL goal <70 long term     Aggressive risk factor modification: HTN, DM, HLD, Diet, Exercise, Obesity     Rehab efforts: The patient has been evaluated by a stroke team provider and the therapy needs have been fully considered based off the presenting complaints and exam findings. The following therapy evaluations are needed: PT evaluate and treat, OT evaluate and treat, SLP evaluate and treat, PM&R evaluate for appropriate placement    Diagnostics ordered/pending: HgbA1C to assess blood glucose levels, TTE to assess cardiac function/status , TSH to assess thyroid function    VTE prophylaxis: Heparin 5000 units SQ every 8 hours  Mechanical prophylaxis: Place SCDs    BP parameters: Infarct: No intervention, SBP <220

## 2022-12-31 LAB
ALBUMIN SERPL BCP-MCNC: 3.6 G/DL (ref 3.5–5.2)
ALP SERPL-CCNC: 88 U/L (ref 55–135)
ALT SERPL W/O P-5'-P-CCNC: 16 U/L (ref 10–44)
ANION GAP SERPL CALC-SCNC: 12 MMOL/L (ref 8–16)
APTT BLDCRRT: 28.9 SEC (ref 21–32)
AST SERPL-CCNC: 18 U/L (ref 10–40)
BASOPHILS # BLD AUTO: 0.02 K/UL (ref 0–0.2)
BASOPHILS NFR BLD: 0.3 % (ref 0–1.9)
BILIRUB SERPL-MCNC: 0.3 MG/DL (ref 0.1–1)
BUN SERPL-MCNC: 6 MG/DL (ref 8–23)
CALCIUM SERPL-MCNC: 9 MG/DL (ref 8.7–10.5)
CHLORIDE SERPL-SCNC: 103 MMOL/L (ref 95–110)
CK MB SERPL-MCNC: 1.7 NG/ML (ref 0.1–6.5)
CK MB SERPL-RTO: 1.6 % (ref 0–5)
CK SERPL-CCNC: 104 U/L (ref 20–180)
CO2 SERPL-SCNC: 23 MMOL/L (ref 23–29)
CREAT SERPL-MCNC: 0.6 MG/DL (ref 0.5–1.4)
DIFFERENTIAL METHOD: ABNORMAL
EOSINOPHIL # BLD AUTO: 0.2 K/UL (ref 0–0.5)
EOSINOPHIL NFR BLD: 3.2 % (ref 0–8)
ERYTHROCYTE [DISTWIDTH] IN BLOOD BY AUTOMATED COUNT: 15 % (ref 11.5–14.5)
EST. GFR  (NO RACE VARIABLE): >60 ML/MIN/1.73 M^2
GLUCOSE SERPL-MCNC: 92 MG/DL (ref 70–110)
HCT VFR BLD AUTO: 40.9 % (ref 37–48.5)
HGB BLD-MCNC: 13 G/DL (ref 12–16)
IMM GRANULOCYTES # BLD AUTO: 0.01 K/UL (ref 0–0.04)
IMM GRANULOCYTES NFR BLD AUTO: 0.2 % (ref 0–0.5)
INR PPP: 1 (ref 0.8–1.2)
LYMPHOCYTES # BLD AUTO: 1.9 K/UL (ref 1–4.8)
LYMPHOCYTES NFR BLD: 31.2 % (ref 18–48)
MAGNESIUM SERPL-MCNC: 1.8 MG/DL (ref 1.6–2.6)
MCH RBC QN AUTO: 28.7 PG (ref 27–31)
MCHC RBC AUTO-ENTMCNC: 31.8 G/DL (ref 32–36)
MCV RBC AUTO: 90 FL (ref 82–98)
MONOCYTES # BLD AUTO: 0.4 K/UL (ref 0.3–1)
MONOCYTES NFR BLD: 6.5 % (ref 4–15)
NEUTROPHILS # BLD AUTO: 3.6 K/UL (ref 1.8–7.7)
NEUTROPHILS NFR BLD: 58.6 % (ref 38–73)
NRBC BLD-RTO: 0 /100 WBC
PHOSPHATE SERPL-MCNC: 3.3 MG/DL (ref 2.7–4.5)
PLATELET # BLD AUTO: 272 K/UL (ref 150–450)
PMV BLD AUTO: 11.9 FL (ref 9.2–12.9)
POTASSIUM SERPL-SCNC: 3.4 MMOL/L (ref 3.5–5.1)
PROT SERPL-MCNC: 7.6 G/DL (ref 6–8.4)
PROTHROMBIN TIME: 10.9 SEC (ref 9–12.5)
RBC # BLD AUTO: 4.53 M/UL (ref 4–5.4)
SODIUM SERPL-SCNC: 138 MMOL/L (ref 136–145)
TROPONIN I SERPL DL<=0.01 NG/ML-MCNC: 0.01 NG/ML (ref 0–0.03)
WBC # BLD AUTO: 6.19 K/UL (ref 3.9–12.7)

## 2022-12-31 PROCEDURE — 25000003 PHARM REV CODE 250: Performed by: NURSE PRACTITIONER

## 2022-12-31 PROCEDURE — 83735 ASSAY OF MAGNESIUM: CPT | Performed by: PHYSICIAN ASSISTANT

## 2022-12-31 PROCEDURE — 84484 ASSAY OF TROPONIN QUANT: CPT | Performed by: PHYSICIAN ASSISTANT

## 2022-12-31 PROCEDURE — 99233 SBSQ HOSP IP/OBS HIGH 50: CPT | Mod: ,,, | Performed by: PSYCHIATRY & NEUROLOGY

## 2022-12-31 PROCEDURE — 80053 COMPREHEN METABOLIC PANEL: CPT | Performed by: PHYSICIAN ASSISTANT

## 2022-12-31 PROCEDURE — 82553 CREATINE MB FRACTION: CPT | Performed by: PHYSICIAN ASSISTANT

## 2022-12-31 PROCEDURE — 85025 COMPLETE CBC W/AUTO DIFF WBC: CPT | Performed by: PHYSICIAN ASSISTANT

## 2022-12-31 PROCEDURE — 85730 THROMBOPLASTIN TIME PARTIAL: CPT | Performed by: PHYSICIAN ASSISTANT

## 2022-12-31 PROCEDURE — 99233 PR SUBSEQUENT HOSPITAL CARE,LEVL III: ICD-10-PCS | Mod: ,,, | Performed by: PSYCHIATRY & NEUROLOGY

## 2022-12-31 PROCEDURE — 93005 ELECTROCARDIOGRAM TRACING: CPT

## 2022-12-31 PROCEDURE — 27000221 HC OXYGEN, UP TO 24 HOURS

## 2022-12-31 PROCEDURE — 93010 EKG 12-LEAD: ICD-10-PCS | Mod: ,,, | Performed by: INTERNAL MEDICINE

## 2022-12-31 PROCEDURE — 63600175 PHARM REV CODE 636 W HCPCS: Performed by: NURSE PRACTITIONER

## 2022-12-31 PROCEDURE — 85610 PROTHROMBIN TIME: CPT | Performed by: PHYSICIAN ASSISTANT

## 2022-12-31 PROCEDURE — 84100 ASSAY OF PHOSPHORUS: CPT | Performed by: PHYSICIAN ASSISTANT

## 2022-12-31 PROCEDURE — 63600175 PHARM REV CODE 636 W HCPCS: Performed by: PHYSICIAN ASSISTANT

## 2022-12-31 PROCEDURE — 97161 PT EVAL LOW COMPLEX 20 MIN: CPT

## 2022-12-31 PROCEDURE — 97165 OT EVAL LOW COMPLEX 30 MIN: CPT

## 2022-12-31 PROCEDURE — 92610 EVALUATE SWALLOWING FUNCTION: CPT

## 2022-12-31 PROCEDURE — 97535 SELF CARE MNGMENT TRAINING: CPT

## 2022-12-31 PROCEDURE — 25000003 PHARM REV CODE 250: Performed by: PHYSICIAN ASSISTANT

## 2022-12-31 PROCEDURE — 94761 N-INVAS EAR/PLS OXIMETRY MLT: CPT

## 2022-12-31 PROCEDURE — 36415 COLL VENOUS BLD VENIPUNCTURE: CPT | Performed by: PHYSICIAN ASSISTANT

## 2022-12-31 PROCEDURE — 11000001 HC ACUTE MED/SURG PRIVATE ROOM

## 2022-12-31 PROCEDURE — 93010 ELECTROCARDIOGRAM REPORT: CPT | Mod: ,,, | Performed by: INTERNAL MEDICINE

## 2022-12-31 PROCEDURE — 97116 GAIT TRAINING THERAPY: CPT

## 2022-12-31 RX ORDER — VALSARTAN 40 MG/1
80 TABLET ORAL DAILY
Status: DISCONTINUED | OUTPATIENT
Start: 2022-12-31 | End: 2022-12-31

## 2022-12-31 RX ORDER — ACETAMINOPHEN 325 MG/1
650 TABLET ORAL EVERY 6 HOURS PRN
Status: DISCONTINUED | OUTPATIENT
Start: 2022-12-31 | End: 2023-01-06 | Stop reason: HOSPADM

## 2022-12-31 RX ORDER — VALSARTAN 40 MG/1
80 TABLET ORAL 2 TIMES DAILY
Status: DISCONTINUED | OUTPATIENT
Start: 2022-12-31 | End: 2023-01-01

## 2022-12-31 RX ORDER — VALSARTAN 40 MG/1
80 TABLET ORAL ONCE
Status: DISCONTINUED | OUTPATIENT
Start: 2022-12-31 | End: 2022-12-31

## 2022-12-31 RX ADMIN — QUETIAPINE FUMARATE 150 MG: 100 TABLET ORAL at 09:12

## 2022-12-31 RX ADMIN — CLOPIDOGREL BISULFATE 75 MG: 75 TABLET ORAL at 09:12

## 2022-12-31 RX ADMIN — HYDRALAZINE HYDROCHLORIDE 10 MG: 20 INJECTION, SOLUTION INTRAMUSCULAR; INTRAVENOUS at 11:12

## 2022-12-31 RX ADMIN — HEPARIN SODIUM 5000 UNITS: 5000 INJECTION INTRAVENOUS; SUBCUTANEOUS at 09:12

## 2022-12-31 RX ADMIN — FLUOXETINE 20 MG: 20 CAPSULE ORAL at 09:12

## 2022-12-31 RX ADMIN — HEPARIN SODIUM 5000 UNITS: 5000 INJECTION INTRAVENOUS; SUBCUTANEOUS at 03:12

## 2022-12-31 RX ADMIN — ATORVASTATIN CALCIUM 40 MG: 20 TABLET, FILM COATED ORAL at 09:12

## 2022-12-31 RX ADMIN — HEPARIN SODIUM 5000 UNITS: 5000 INJECTION INTRAVENOUS; SUBCUTANEOUS at 05:12

## 2022-12-31 RX ADMIN — LEVOTHYROXINE SODIUM 50 MCG: 50 TABLET ORAL at 05:12

## 2022-12-31 RX ADMIN — ASPIRIN 81 MG: 81 TABLET, COATED ORAL at 09:12

## 2022-12-31 RX ADMIN — VALSARTAN 80 MG: 40 TABLET, FILM COATED ORAL at 05:12

## 2022-12-31 RX ADMIN — ACETAMINOPHEN 650 MG: 325 TABLET ORAL at 05:12

## 2022-12-31 RX ADMIN — VALSARTAN 80 MG: 40 TABLET, FILM COATED ORAL at 09:12

## 2022-12-31 RX ADMIN — ACETAMINOPHEN 650 MG: 325 TABLET ORAL at 09:12

## 2022-12-31 NOTE — ED NOTES
Telemetry Verification   Patient placed on Telemetry Box  Verified with War Room  Box # [id number]   Rate    Rhythm

## 2022-12-31 NOTE — ASSESSMENT & PLAN NOTE
Stroke Risk Factor   SBP < 220 for acute infarct without intervention  1/2 dose of home valsartan started today for pressures consistently above goal despite PRN pushes   Will titrate as needed

## 2022-12-31 NOTE — PT/OT/SLP EVAL
"Physical Therapy Evaluation/co eval with OT    Patient Name:  Nicolás Khan   MRN:  [id number]    Recommendations:     Discharge Recommendations: nursing facility, skilled   Discharge Equipment Recommendations:  (TBD as pt progresses)   Barriers to discharge: Inaccessible home 2 JORGE with no rail    Assessment:     Nicolás Khan is a 72 y.o. female admitted with a medical diagnosis of Cerebral infarction due to embolism of left middle cerebral artery.  She presents with the following impairments/functional limitations: weakness, impaired functional mobility, gait instability, impaired balance, pain, decreased safety awareness . Pt is unsafe with functional mobility at this time due to pt requires moderate assist for bed mobility, CGA for transfers, and minimal assist for gait due to c/o dizziness,weakness, and instability. Pt is motivated to progress with functional mobility.     Rehab Prognosis: Good; patient would benefit from acute skilled PT services to address these deficits and reach maximum level of function.    Recent Surgery: * No surgery found *      Plan:     During this hospitalization, patient to be seen 4 x/week to address the identified rehab impairments via gait training, therapeutic activities, therapeutic exercises, neuromuscular re-education and progress toward the following goals:    Plan of Care Expires:  01/30/23    Subjective   "I am dizzy" (upon sitting and standing) PT/OT attempted to assess BP without success; ox sat 94%)    Pain/Comfort:  Pain Rating 1:  (pt c/o pain in her R calf upon sitting/returned to supine; no c/o pain upon sitting up 2nd time; pt unable to grade pain)  Location - Side 1: Right  Location - Orientation 1: generalized  Location 1: calf  Pain Addressed 1: Reposition, Cessation of Activity, Nurse notified  Pain Rating Post-Intervention 1: 0/10 (pt stated "no" when asked if she was in pain when sitting in the bedside chair at the end of treatment;)    Patients cultural, " spiritual, Yarsanism conflicts given the current situation: no    Living Environment:  Pt lives with her sister and her son in a 1 story home with 2 JORGE with no rail  Prior to admission, patients level of function was independent, used a SC at times and sh chair for showering.  Equipment used at home: shower chair, rollator (pt states her rollator is broken).  Pt has a walk in shower with built in seat. Upon discharge, patient will have assistance from unknown.    Objective:     Communicated with nurse prior to session.  Patient found HOB elevated with bed alarm, oxygen, PureWick, peripheral IV, SCD, telemetry  upon PT entry to room.    General Precautions: Standard, fall  Orthopedic Precautions:N/A   Braces: N/A  Respiratory Status: Nasal cannula, flow 2 L/min    Exams:  Cognitive Exam:  Patient is oriented to Person and Place  Sensation:    -       Intact  light/touch B LE  RLE ROM: WFL  RLE Strength: Deficits: hip flex 3-/5; knee ext/flex 4-/5; ankle DF 3+/5  LLE ROM: WFL  LLE Strength: Deficits: hip flex 3+/5; knee ext/flex 4/5; ankle DF 4-/5    Functional Mobility:  Bed Mobility:     Rolling Right: moderate assistance  Supine to Sit: moderate assistance  Transfers:     Sit to Stand:  contact guard assistance with rolling walker  Gait: 14ft x 2 trials with RW with minimal assist. Pt c/o dizziness upon sitting and standing. Pt performed gait with flexed trunk, decreased step length, and required manual cues to direct the walker when turning to sit.     AM-PAC 6 CLICK MOBILITY  Total Score:15     Treatment & Education:   pt sat on the EOB ~ 4 min with SBA assist initially due to pt c/o dizziness upon sitting and pain in her R calf; pt's nurse present to assess and pt again sat up on the EOB with no c/o calf pain. Pt ambulated to the bathroom as above and urinated on the commode, nurse notified. Pt able to clean herself with set up assist in the front and then stood x 2 with RW support with CGA trials to further  clean herself in the back.   Co-treat with OT due to medical complexity of pt (pt dizzy during sitting and standing) and need for skilled hands for safe intervention.   Patient left up in chair with all lines intact, call button in reach, chair alarm on, nurse notified, and niece present.    GOALS:   Multidisciplinary Problems       Physical Therapy Goals          Problem: Physical Therapy    Goal Priority Disciplines Outcome Goal Variances Interventions   Physical Therapy Goal     PT, PT/OT Ongoing, Progressing     Description: PT goals until 12/31/23    1. Pt supine to sit with mod independent-not met  2. Pt sit to supine with mod independent-not met  3. Pt sit to stand with RW with supervision-not met  4. Pt to perform gait 125ft with RW with supervision.-not met  5. Pt to up/down 2 steps with no rail with CGA.-not met  6. Pt to perform B LE exs in sitting or supine x 10 reps to strengthen B LE to improve functional mobility.-not met                        History:     Past Medical History:   Diagnosis Date    COPD (chronic obstructive pulmonary disease)     Diabetes mellitus     GERD (gastroesophageal reflux disease)     Hyperlipemia, mixed     Hypertension     Major depression     Obesity     Osteoarthritis     Thyroid disease     Tobacco abuse     Urinary incontinence, functional     Vitamin D deficiency        Past Surgical History:   Procedure Laterality Date    HYSTERECTOMY         Time Tracking:     PT Received On: 12/31/22  PT Start Time: 1100     PT Stop Time: 1129  PT Total Time (min): 29 min     Billable Minutes: Evaluation 15 and Gait Training 14      12/31/2022

## 2022-12-31 NOTE — SUBJECTIVE & OBJECTIVE
Past Medical History:   Diagnosis Date    COPD (chronic obstructive pulmonary disease)     Diabetes mellitus     GERD (gastroesophageal reflux disease)     Hyperlipemia, mixed     Hypertension     Major depression     Obesity     Osteoarthritis     Thyroid disease     Tobacco abuse     Urinary incontinence, functional     Vitamin D deficiency      Past Surgical History:   Procedure Laterality Date    HYSTERECTOMY       No pertinent family history obtained during this encounter.   Social History     Tobacco Use    Smoking status: Every Day     Packs/day: 1.00     Types: Cigarettes    Smokeless tobacco: Never   Substance Use Topics    Alcohol use: Yes     Review of patient's allergies indicates:  No Known Allergies    Medications: I have reviewed the current medication administration record.    (Not in a hospital admission)      Review of Systems   Constitutional:  Negative for fever.   HENT:  Negative for drooling and trouble swallowing.    Eyes:  Negative for visual disturbance.   Respiratory:  Negative for choking and shortness of breath.    Cardiovascular:  Positive for chest pain.   Gastrointestinal:  Positive for abdominal pain. Negative for vomiting.   Genitourinary:  Negative for difficulty urinating.   Musculoskeletal:  Negative for gait problem.   Neurological:  Positive for speech difficulty, weakness and headaches.   Psychiatric/Behavioral:  Positive for confusion.    Objective:     Vital Signs (Most Recent):  Temp: 98 °F (36.7 °C) (12/30/22 1924)  Pulse: 87 (12/30/22 2029)  Resp: (!) 22 (12/30/22 2029)  BP: (!) 195/81 (12/30/22 1915)  SpO2: 99 % (12/30/22 2029)    Vital Signs Range (Last 24H):  Temp:  [97.7 °F (36.5 °C)-98 °F (36.7 °C)]   Pulse:  [83-98]   Resp:  [16-22]   BP: (186-226)/()   SpO2:  [94 %-100 %]     Physical Exam  Vitals and nursing note reviewed.   Constitutional:       Appearance: She is obese.   HENT:      Head: Normocephalic and atraumatic.      Nose: No rhinorrhea.   Eyes:       General: No scleral icterus.     Extraocular Movements: Extraocular movements intact.   Cardiovascular:      Rate and Rhythm: Normal rate.   Pulmonary:      Effort: Pulmonary effort is normal. No respiratory distress.   Abdominal:      General: There is no distension.   Musculoskeletal:         General: No tenderness.   Skin:     General: Skin is warm and dry.   Neurological:      Mental Status: She is alert. She is disoriented.      Cranial Nerves: Facial asymmetry present.      Motor: Weakness present.   Psychiatric:         Behavior: Behavior is cooperative.       Neurological Exam:   LOC: alert  Attention Span: Good   Language: Mild expressive aphasia   Articulation: No dysarthria  Orientation: Not oriented to time  Visual Fields: Full  EOM (CN III, IV, VI): Full/intact  Facial Movement (CN VII): Lower facial weakness on the Right  Motor: Arm left  Normal 5/5  Leg left  Normal 5/5  Arm right  Paresis: 4/5  Leg right Normal 5/5  Sensation: Intact to light touch       Laboratory:  CMP:   Recent Labs   Lab 12/30/22  1603   CALCIUM 9.3   ALBUMIN 3.9   PROT 8.4      K 3.2*   CO2 21*      BUN 9   CREATININE 0.8   ALKPHOS 90   ALT 15   AST 16   BILITOT 0.2     CBC:   Recent Labs   Lab 12/30/22  1603 12/30/22  1604   WBC 6.49  --    RBC 4.61  --    HGB 13.4  --    HCT 40.4 45     --    MCV 88  --    MCH 29.1  --    MCHC 33.2  --      Lipid Panel:   Recent Labs   Lab 12/30/22  1603   CHOL 246*   LDLCALC 170.0*   HDL 59   TRIG 85     Coagulation:   Recent Labs   Lab 12/30/22  1603   INR 1.0     Hgb A1C: No results for input(s): HGBA1C in the last 168 hours.  TSH:   Recent Labs   Lab 12/30/22  1603   TSH 1.899       Diagnostic Results:      Brain imaging:  MRI Brain WO Contrast pending     CT 12/30/22   Findings concerning for recent infarction involving the left frontal lobe and insular ribbon, as above. No intracranial hemorrhage. Sequela of mild senescent and chronic microvascular ischemic  change with stable left caudate remote lacunar type infarct.    Vessel Imaging:  CTA H/N 12/30/22   1. Left anterior cerebral artery mid to distal A2 occlusion.  If clinical concern for acute infarction, further evaluation may be obtained with MRI.  2. CTA head and neck with no additional large vessel occlusion or dissection.

## 2022-12-31 NOTE — ED TRIAGE NOTES
Nicolás Khan, a 72 y.o. female presents to the ED transfer from  w/ stroke, on arrival pt is diaphoretic, awakened by voice and stimulation, slurred speech, complaining of headache and CP, received dil at , able to answer questions, only deficit to RLE, pt hooked up to monitor and taken to MRI.    Triage note:  Chief Complaint   Patient presents with    Cerebrovascular Accident     Pt reports to the ED with C/O AMS and aphasia that started at 1330 today. Pt is confused but converse. Pt stroke activated on arrival and sent to CT. Pt also reports chest and abdominal pain.      Review of patient's allergies indicates:  No Known Allergies  Past Medical History:   Diagnosis Date    COPD (chronic obstructive pulmonary disease)     Diabetes mellitus     GERD (gastroesophageal reflux disease)     Hyperlipemia, mixed     Hypertension     Major depression     Obesity     Osteoarthritis     Thyroid disease     Tobacco abuse     Urinary incontinence, functional     Vitamin D deficiency

## 2022-12-31 NOTE — HOSPITAL COURSE
2022 Patient c/o R calf pain today, US r/o DVT. 1/2 dose of home valsartan was started this morning for SBP > 220 despite PRN pushes. Goal < 220, will continue to monitor. TTE pending. EKG obtained for evaluation of Qtc given higher dose of seroquel at home. Therapy recommendations for SNF. On DAPT.   2023 Patient c/o R calf pain. US negative for DVT. Will apply lidocaine patch to assist with pain management along with already PRN tylenol. TTE pending. Pending SNF. Other 1/2 of home valsartan started yesterday for SBP > 200 still. Today will continue to monitor and add home HCTZ if needed for persistent hypertension.   2023 NAEO, neuro exam stable. Continues to have mild R hemiparesis. BP meds titrated for better control, echo pending to complete stroke workup.  2023 NAEO, neuro exam stable. BP improved following increased HCTZ and resuming amlodipine. Echo complete and results pending. Therapy re-eval for updated dispo recs today.  2023 NAEO, neuro exam unchanged. BP continues to improve on current regimen. Echo unremarkable. Pending dispo to SNF, remains medically ready.  2023 No acute events overnight. BP better controlled on current regimen. Pending dispo to SNF, CXR,COVID swab and PPD ordered for placement per SW. Patient continues to complain of joint pain in LLE. Remains medically ready for discharge.  2023 No acute events overnight. Patient remains medically ready for discharge to SNF. She will be discharged to SNF today. Cardiac monitor at time of discharge for prior embolic insular cortex infarct. Etiology for current CR infarct most likely . Patient to be referred to smoking cessation program. She will be discharged on DAPT and atorvastatin 80.

## 2022-12-31 NOTE — SUBJECTIVE & OBJECTIVE
Neurologic Chief Complaint: CASTELLANOS and L A2 occlusion     Subjective:     Interval History: Patient is seen for follow-up neurological assessment and treatment recommendations:     Patient c/o R calf pain today, US r/o DVT. 1/2 dose of home valsartan was started this morning for SBP > 220 despite PRN pushes. Goal < 220, will continue to monitor. TTE pending. EKG obtained for evaluation of Qtc given higher dose of seroquel at home. Therapy recommendations for SNF. On DAPT.     HPI, Past Medical, Family, and Social History remains the same as documented in the initial encounter.     Review of Systems   Constitutional:  Negative for fever.   HENT:  Negative for drooling.    Eyes:  Negative for visual disturbance.   Respiratory:  Negative for cough.    Gastrointestinal:  Negative for diarrhea and vomiting.   Genitourinary:  Negative for difficulty urinating.   Musculoskeletal:         R calf pain    Neurological:  Positive for facial asymmetry, weakness and numbness. Negative for speech difficulty.   Psychiatric/Behavioral:  Negative for agitation, behavioral problems and confusion.    Scheduled Meds:   aspirin  81 mg Oral Daily    atorvastatin  40 mg Oral Daily    clopidogreL  75 mg Oral Daily    FLUoxetine  20 mg Oral Daily    heparin (porcine)  5,000 Units Subcutaneous Q8H    hydrALAZINE  10 mg Intravenous Once    levothyroxine  50 mcg Oral Before breakfast    QUEtiapine  150 mg Oral Nightly    valsartan  80 mg Oral Daily     Continuous Infusions:   sodium chloride 0.9%       PRN Meds:acetaminophen, albuterol, labetaloL, melatonin, sodium chloride 0.9%, sodium chloride 0.9%    Objective:     Vital Signs (Most Recent):  Temp: 98.1 °F (36.7 °C) (12/31/22 1235)  Pulse: 81 (12/31/22 1235)  Resp: 18 (12/31/22 1235)  BP: (!) 199/91 (12/31/22 1235)  SpO2: 97 % (12/31/22 1235)  BP Location: Right arm    Vital Signs Range (Last 24H):  Temp:  [96.2 °F (35.7 °C)-98.8 °F (37.1 °C)]   Pulse:  [73-98]   Resp:  [16-22]   BP:  (186-241)/()   SpO2:  [90 %-100 %]   BP Location: Right arm    Physical Exam  Vitals and nursing note reviewed.   Constitutional:       Appearance: She is obese. She is not ill-appearing.   HENT:      Head: Normocephalic and atraumatic.      Nose: No rhinorrhea.   Eyes:      General: No scleral icterus.     Extraocular Movements: Extraocular movements intact.   Cardiovascular:      Rate and Rhythm: Normal rate.   Pulmonary:      Effort: Pulmonary effort is normal. No respiratory distress.   Musculoskeletal:         General: No tenderness.   Skin:     General: Skin is warm and dry.   Neurological:      Mental Status: She is alert and oriented to person, place, and time.      Sensory: Sensory deficit present.      Motor: Weakness (improving) present.      Comments: Very mild RUE drift   RLE weakness improved/resolved from yesterday   Facial droop present   Alert and oriented x3      Psychiatric:         Behavior: Behavior is cooperative.       Neurological Exam:   LOC: alert  Attention Span: Good   Language: No aphasia  Articulation: No dysarthria  Orientation: Person, Place, Time   Visual Fields: Full  EOM (CN III, IV, VI): Full/intact  Facial Movement (CN VII): Lower facial weakness on the Right  Motor: Arm left  Normal 5/5  Leg left  Normal 5/5  Arm right  Paresis: 4/5  Leg right Paresis: 4/5  Sensation: Slightly decreased on R side with simultaneous testing     Laboratory:  CMP:   Recent Labs   Lab 12/31/22  0523   CALCIUM 9.0   ALBUMIN 3.6   PROT 7.6      K 3.4*   CO2 23      BUN 6*   CREATININE 0.6   ALKPHOS 88   ALT 16   AST 18   BILITOT 0.3     BMP:   Recent Labs   Lab 12/31/22  0523      K 3.4*      CO2 23   BUN 6*   CREATININE 0.6   CALCIUM 9.0     CBC:   Recent Labs   Lab 12/31/22  0523   WBC 6.19   RBC 4.53   HGB 13.0   HCT 40.9      MCV 90   MCH 28.7   MCHC 31.8*     Lipid Panel:   Recent Labs   Lab 12/30/22  1603   CHOL 246*   LDLCALC 170.0*   HDL 59   TRIG 85      Coagulation:   Recent Labs   Lab 12/31/22  0523   INR 1.0   APTT 28.9     Platelet Aggregation Study: No results for input(s): PLTAGG, PLTAGINTERP, PLTAGREGLACO, ADPPLTAGGREG in the last 168 hours.  Hgb A1C:   Recent Labs   Lab 12/30/22  2304   HGBA1C 6.0*     TSH:   Recent Labs   Lab 12/30/22  1603   TSH 1.899       Diagnostic Results     Brain imaging:  MRI Brain WO Contrast 12/30/22  1. Small focus of acute/subacute lacunar infarction in the left corona radiata.  Recommend follow-up.  2. Involutional changes with chronic microvascular ischemic changes.  Remote left frontal cortical infarct.  3. Mild paranasal sinus disease.     CTH 12/30/22   Findings concerning for recent infarction involving the left frontal lobe and insular ribbon, as above. No intracranial hemorrhage. Sequela of mild senescent and chronic microvascular ischemic change with stable left caudate remote lacunar type infarct.     Vessel Imaging:  CTA H/N 12/30/22   1. Left anterior cerebral artery mid to distal A2 occlusion.  If clinical concern for acute infarction, further evaluation may be obtained with MRI.  2. CTA head and neck with no additional large vessel occlusion or dissection.    Cardiac Imaging   TTE  pending

## 2022-12-31 NOTE — PLAN OF CARE
Problem: Physical Therapy  Goal: Physical Therapy Goal  Description: PT goals until 12/31/23    1. Pt supine to sit with mod independent-not met  2. Pt sit to supine with mod independent-not met  3. Pt sit to stand with RW with supervision-not met  4. Pt to perform gait 125ft with RW with supervision.-not met  5. Pt to up/down 2 steps with no rail with CGA.-not met  6. Pt to perform B LE exs in sitting or supine x 10 reps to strengthen B LE to improve functional mobility.-not met   Outcome: Ongoing, Progressing   Pt's goals set and pt will benefit from skilled PT services to work towards improved functional mobility including: bed mobility, transfers, up/down steps,  and gait.   12/31/2022

## 2022-12-31 NOTE — PLAN OF CARE
Problem: Occupational Therapy  Goal: Occupational Therapy Goal  Description: Goals to be met by: 1/7/2023 (1 week)     Patient will increase functional independence with ADLs by performing:    UE Dressing with Supervision.  LE Dressing with Supervision.  Grooming while standing at sink with Supervision.  Toileting from toilet with Supervision for hygiene and clothing management.   Rolling to Bilateral with [place].   Supine to sit with [place].  Step transfer with Supervision  Toilet transfer to toilet with Supervision.      Evaluated pt and established OT POC. Continue OT as tolerated.  Tessie Cox OT  12/31/2022    Outcome: Ongoing, Progressing

## 2022-12-31 NOTE — PLAN OF CARE
Problem: Adult Inpatient Plan of Care  Goal: Plan of Care Review  Outcome: Ongoing, Progressing  Flowsheets (Taken 12/31/2022 0446)  Plan of Care Reviewed With: patient     Problem: Adjustment to Illness (Stroke, Ischemic/Transient Ischemic Attack)  Goal: Optimal Coping  Outcome: Ongoing, Progressing  Intervention: Support Psychosocial Response to Stroke  Flowsheets (Taken 12/31/2022 0446)  Supportive Measures: verbalization of feelings encouraged  Family/Support System Care:   involvement promoted   support provided     Problem: Fall Injury Risk  Goal: Absence of Fall and Fall-Related Injury  Outcome: Ongoing, Progressing  Intervention: Promote Injury-Free Environment  Flowsheets (Taken 12/31/2022 0446)  Safety Promotion/Fall Prevention:   Fall Risk reviewed with patient/family   Fall Risk signage in place   bed alarm set   medications reviewed   nonskid shoes/socks when out of bed   instructed to call staff for mobility   side rails raised x 3   POC reviewed with pt at the bedside.  Needs reinforcement.  No neuro changes.  SPB monitored, below parameters.  Stroke education performed, verbalized understanding and material is at the bedside.  Remains free from falls.  Adjustment to illness ongoing.  Safety maintained.  Instructed to call for assistance.  Bed locked.  Call light within reach.

## 2022-12-31 NOTE — ED NOTES
Telemetry Verification   Patient placed on Telemetry Box  Verified with War Room  Box # [id number]   Monitor Tech    Rate 73   Rhythm NS

## 2022-12-31 NOTE — ED NOTES
Bed: 19  Expected date: 12/30/22  Expected time: 8:24 PM  Means of arrival:   Comments:  WB transfer

## 2022-12-31 NOTE — PT/OT/SLP EVAL
"Speech Language Pathology Evaluation  Bedside Swallow    Patient Name:  Nicolás Khan   MRN:  [id number]  Admitting Diagnosis: Cerebral infarction due to embolism of left middle cerebral artery    Recommendations:                 General Recommendations:  Dysphagia therapy  Diet recommendations:  Regular, Thin   Aspiration Precautions: Assistance with meals, Small bites/sips, and Standard aspiration precautions   General Precautions: Standard, fall  Communication strategies:  provide increased time to answer    History:     Past Medical History:   Diagnosis Date    COPD (chronic obstructive pulmonary disease)     Diabetes mellitus     GERD (gastroesophageal reflux disease)     Hyperlipemia, mixed     Hypertension     Major depression     Obesity     Osteoarthritis     Thyroid disease     Tobacco abuse     Urinary incontinence, functional     Vitamin D deficiency        Past Surgical History:   Procedure Laterality Date    HYSTERECTOMY           Prior Intubation HX:  none this admit    Modified Barium Swallow: none this admit    Chest X-Rays: n/a    Prior diet: regular/thin.      Subjective     Awake/alert  "I'm trying my best."    Pain/Comfort:  Pain Rating 1: 0/10  Pain Rating Post-Intervention 1: 0/10    Respiratory Status:nasal cannula    Objective:     Oral Musculature Evaluation  Oral Musculature: general weakness, facial asymmetry present (Pt with twitches noted throughout eval)  Dentition: scattered dentition  Oral Labial Strength and Mobility: impaired retraction  Lingual Strength and Mobility: impaired strength  Volitional Cough: mildly decreased  Voice Prior to PO Intake: clear    Bedside Swallow Eval:   Consistencies Assessed:  Thin liquids x4 cup  Solids x2 link sausage      Oral Phase:   WFL    Pharyngeal Phase:   no overt clinical signs/symptoms of aspiration      Assessment:     Nicolás Khan is a 72 y.o. female with an SLP diagnosis of Dysphagia.   Goals:   Multidisciplinary Problems       SLP Goals "          Problem: SLP    Goal Priority Disciplines Outcome   SLP Goal     SLP    Description: Speech Language Pathology Goals  Goals expected to be met by 1/7    1. Pt will participate in ongoing swallow assessment  2. Pt will participate in SLC eval                               Plan:     Patient to be seen:  4 x/week   Plan of Care reviewed with:  patient   SLP Follow-Up:  Yes       Discharge recommendations:  nursing facility, skilled       Time Tracking:     SLP Treatment Date:   12/31/22  Speech Start Time:  0819  Speech Stop Time:  0824     Speech Total Time (min):  5 min    Billable Minutes: Eval Swallow and Oral Function 5    12/31/2022

## 2022-12-31 NOTE — PT/OT/SLP EVAL
Occupational Therapy   Co-Evaluation and Co-Treat  Co-treatment with PT for maximal pt participation, safety, and activity tolerance       Name: Nicolás Khan  MRN: [id number]  Admitting Diagnosis: Cerebral infarction due to embolism of left middle cerebral artery  Recent Surgery: * No surgery found *      Recommendations:     Discharge Recommendations: nursing facility, skilled  Discharge Equipment Recommendations:   (tbd)  Barriers to discharge:  None    Assessment:     Nicolás Khan is a 72 y.o. female with a medical diagnosis of Cerebral infarction due to embolism of left middle cerebral artery.  She presents with impaired ADL and mobility performance deficits.  Pt found upright in bed and agreeable for therapy today with pt's niece present and supportive. Pt limited 2/2 lightheadedness and acute RLE pain (LENA Estrella aware to assess deficit). Pt tolerated movement in bedroom and bathroom however required seated break for ADLs on commode. Pt required mod A for bed mobility and CGA for ambulation using RW. Pt with poor endurance and lateral sway posing as a high fall risk at time of eval. PTA, pt was living with son and (I) with ADLs and mobility. Pt would benefit from continued OT skilled services 3x/wk to improve daily living skills to optimize QOL.  Pt is recommended to discharge to SNF at this time.   Performance deficits affecting function: weakness, impaired functional mobility, impaired endurance, gait instability, impaired self care skills, decreased lower extremity function, decreased upper extremity function, decreased safety awareness, impaired balance, pain, decreased coordination, impaired fine motor.      Rehab Prognosis: Good; patient would benefit from acute skilled OT services to address these deficits and reach maximum level of function.       Plan:     Patient to be seen 3 x/week to address the above listed problems via self-care/home management, therapeutic exercises, therapeutic activities,  neuromuscular re-education  Plan of Care Expires: 01/31/23  Plan of Care Reviewed with: patient, other (see comments) (niece)    Subjective     Chief Complaint: RLE pain (burning in calf--RN aware).  Patient/Family Comments/goals: return home and decrease pain     Occupational Profile:  Living Environment: Pt lives with her son in a H with 2 JORGE. Pt has a shower/tub combo for bathing.  Previous level of function: I with ADLs and mobility  Roles and Routines: Involved with family  Equipment Used at Home: rollator  Assistance upon Discharge: Son as able     Pain/Comfort:  Pain Rating 1:  (pain voiced in R calf upon movement)  Location - Side 1: Right  Location - Orientation 1: generalized  Location 1: calf  Pain Addressed 1: Cessation of Activity, Nurse notified, Distraction, Reposition    Patients cultural, spiritual, Lutheran conflicts given the current situation: no    Objective:     Communicated with: RN prior to session.  Patient found HOB elevated with bed alarm, peripheral IV, PureWick, oxygen, SCD (02 doffed upon arrival) upon OT entry to room.    General Precautions: Standard, fall  Orthopedic Precautions: N/A  Braces: N/A  Respiratory Status: Room air    Occupational Performance:    Bed Mobility:    Patient completed Rolling/Turning to Right with moderate assistance  Patient completed Scooting/Bridging with moderate assistance  Patient completed Supine to Sit with moderate assistance    Functional Mobility/Transfers:  Patient completed Sit <> Stand Transfer with contact guard assistance  with  rolling walker   Patient completed Bed <> Chair Transfer using Step Transfer technique with contact guard assistance with rolling walker  Patient completed Toilet Transfer Step Transfer technique with contact guard assistance with  rolling walker  Functional Mobility: Pt required CGA for bedroom and bathroom t/f however was min A for gait using RW. Pt with lateral sway and unsteady THANG.    Activities of Daily  Living:  Grooming: setup A on commode to wash face and brush teeth  Upper Body Dressing: minimum assistance donning gown at EOB   Lower Body Dressing: total assistance donning socks   Toileting: mod assistance for pericare (pt did A in standing frontal pericare, needed A to complete LB management and sacral cleaning)    Cognitive/Visual Perceptual:  Cognitive/Psychosocial Skills:     -       Oriented to: Person, Place, Time, and Situation   -       Follows Commands/attention:Follows multistep  commands  -       Communication: clear/fluent  -       Memory: No Deficits noted  -       Safety awareness/insight to disability: intact   -       Mood/Affect/Coping skills/emotional control: Appropriate to situation    Physical Exam:  Balance:    -       demo good sitting balance, fair standing balance   Dominant hand:    -       right  Upper Extremity Range of Motion:     -       Right Upper Extremity: WFL except limited shoulder flexion  -       Left Upper Extremity: WFL except limited shoulder flexion  Upper Extremity Strength:    -       Right Upper Extremity: WFL  -       Left Upper Extremity: WFL   Strength:    -       Right Upper Extremity: WFL  -       Left Upper Extremity: WFL    AMPAC 6 Click ADL:  AMPAC Total Score: 16    Treatment & Education:  Pt educated on role of occupational therapy, POC, and safety during ADLs and functional mobility. Pt and OT discussed importance of safe, continued mobility to optimize daily living skills. Pt verbalized understanding.     White board updated during session. Pt given instruction to call for medical staff/nurse for assistance.       Patient left up in chair with all lines intact, call button in reach, chair alarm on, RN notified, and niece present    GOALS:   Multidisciplinary Problems       Occupational Therapy Goals          Problem: Occupational Therapy    Goal Priority Disciplines Outcome Interventions   Occupational Therapy Goal     OT, PT/OT Ongoing, Progressing     Description: Goals to be met by: 1/7/2023 (1 week)     Patient will increase functional independence with ADLs by performing:    UE Dressing with Supervision.  LE Dressing with Supervision.  Grooming while standing at sink with Supervision.  Toileting from toilet with Supervision for hygiene and clothing management.   Rolling to Bilateral with [place].   Supine to sit with [place].  Step transfer with Supervision  Toilet transfer to toilet with Supervision.                         History:     Past Medical History:   Diagnosis Date    COPD (chronic obstructive pulmonary disease)     Diabetes mellitus     GERD (gastroesophageal reflux disease)     Hyperlipemia, mixed     Hypertension     Major depression     Obesity     Osteoarthritis     Thyroid disease     Tobacco abuse     Urinary incontinence, functional     Vitamin D deficiency          Past Surgical History:   Procedure Laterality Date    HYSTERECTOMY         Time Tracking:     OT Date of Treatment: 12/31/22  OT Start Time: 1101  OT Stop Time: 1124  OT Total Time (min): 23 min    Billable Minutes:Evaluation 10 min  Self Care/Home Management 13 min    12/31/2022

## 2022-12-31 NOTE — ED PROVIDER NOTES
Encounter Date: 12/30/2022       History     Chief Complaint   Patient presents with    Cerebrovascular Accident     Pt reports to the ED with C/O AMS and aphasia that started at 1330 today. Pt is confused but converse. Pt stroke activated on arrival and sent to CT. Pt also reports chest and abdominal pain.      72-year-old female with past medical history of hypertension, COPD, GERD, HLD presents the ED as a transfer from [facility] with a CTA confirmed left KEITH occlusion. Case was discussed at the previous facility with vascular neurology and they requested a stat MRI which was unable to be performed - which is the reason for transfer. History is limited due to patient's acuity of condition but she does complain of a headache.  EMS reported the patient received 0.5 mg of Dilaudid 10-15 minutes prior to transfer. No other complaints at this time.    The history is provided by the patient, medical records and the EMS personnel.   Review of patient's allergies indicates:  No Known Allergies  Past Medical History:   Diagnosis Date    COPD (chronic obstructive pulmonary disease)     Diabetes mellitus     GERD (gastroesophageal reflux disease)     Hyperlipemia, mixed     Hypertension     Major depression     Obesity     Osteoarthritis     Thyroid disease     Tobacco abuse     Urinary incontinence, functional     Vitamin D deficiency      Past Surgical History:   Procedure Laterality Date    HYSTERECTOMY       No family history on file.  Social History     Tobacco Use    Smoking status: Every Day     Packs/day: 1.00     Types: Cigarettes    Smokeless tobacco: Never   Substance Use Topics    Alcohol use: Yes     Review of Systems   Unable to perform ROS: Acuity of condition (LIMITED)   Neurological:  Positive for headaches.     Physical Exam     Initial Vitals   BP Pulse Resp Temp SpO2   12/30/22 1526 12/30/22 1526 12/30/22 1526 12/30/22 1612 12/30/22 1526   (!) 201/95 83 16 97.7 °F (36.5 °C) 98 %      MAP       --                 Physical Exam    Nursing note and vitals reviewed.  HENT:   Head: Normocephalic and atraumatic.   Right Ear: External ear normal.   Left Ear: External ear normal.   Eyes: EOM are normal. Right eye exhibits no discharge. Left eye exhibits no discharge.   Neck: Neck supple.   Normal range of motion.  Cardiovascular:  Normal rate, regular rhythm and normal heart sounds.     Exam reveals no friction rub.       No murmur heard.  Pulmonary/Chest: Breath sounds normal. No respiratory distress. She has no wheezes. She has no rales.   Abdominal: Abdomen is soft. She exhibits no distension. There is no abdominal tenderness.   Musculoskeletal:         General: No tenderness or edema.      Cervical back: Normal range of motion and neck supple.     Neurological: She is alert.   Oriented to place and person.    3/5 strength in the bilateral upper extremities and left lower extremity.  Right lower extremity is 2.5/5.  Patient appears to be globally weak.    Patient able to perform symmetrically weak smile.    Patient is able to answer questions appropriately when repeatedly prompted.   Skin: Skin is warm and dry. Capillary refill takes less than 2 seconds.       ED Course   Procedures  Labs Reviewed   CBC W/ AUTO DIFFERENTIAL - Abnormal; Notable for the following components:       Result Value    RDW 14.8 (*)     All other components within normal limits   COMPREHENSIVE METABOLIC PANEL - Abnormal; Notable for the following components:    Potassium 3.2 (*)     CO2 21 (*)     All other components within normal limits   LIPID PANEL - Abnormal; Notable for the following components:    Cholesterol 246 (*)     LDL Cholesterol 170.0 (*)     All other components within normal limits   HEMOGLOBIN A1C - Abnormal; Notable for the following components:    Hemoglobin A1C 6.0 (*)     All other components within normal limits   ISTAT PROCEDURE - Abnormal; Notable for the following components:    POC Potassium 3.1 (*)     POC Anion Gap 18  (*)     All other components within normal limits   PROTIME-INR   TSH   MAGNESIUM   PHOSPHORUS   PHOSPHORUS   MAGNESIUM   COMPREHENSIVE METABOLIC PANEL   MAGNESIUM   PHOSPHORUS   CBC W/ AUTO DIFFERENTIAL   TROPONIN I   CK-MB   APTT   PROTIME-INR   SARS-COV-2 RDRP GENE   POCT GLUCOSE, HAND-HELD DEVICE   POCT GLUCOSE   ISTAT PROCEDURE   ISTAT CHEM8   POCT PROTIME-INR          Imaging Results              MRI Brain Without Contrast (Final result)  Result time 12/30/22 22:23:42      Final result by Lucien Tariq MD (12/30/22 22:23:42)                   Impression:      1. Small focus of acute/subacute lacunar infarction in the left corona radiata.  Recommend follow-up.  2. Involutional changes with chronic microvascular ischemic changes.  Remote left frontal cortical infarct.  3. Mild paranasal sinus disease.      Electronically signed by: Lucien Tariq  Date:    12/30/2022  Time:    22:23               Narrative:    EXAMINATION:  MRI BRAIN WITHOUT CONTRAST    CLINICAL HISTORY:  Stroke, follow up;.    TECHNIQUE:  Multiplanar multisequence MR imaging of the brain was performed without contrast.    COMPARISON:  12/30/2022    FINDINGS:  Intracranial compartment:    No midline shift or hydrocephalus.  No extra-axial blood or fluid collections.    Diffusion-weighted imaging demonstrates a small focus in the left corona radiata consistent with a small focus of acute/subacute lacunar infarction.    Remote left frontal cortical infarct.    Moderate involutional changes and chronic microvascular ischemic changes in the periventricular white matter.  No mass lesion, acute hemorrhage, edema or acute infarct.    Mild frontal, ethmoid and left maxillary sinus disease.    Normal vascular flow voids are preserved.    Skull/extracranial contents (limited evaluation): Bone marrow signal intensity is normal.                                        CTA Head and Neck (xpd) (Final result)  Result time 12/30/22 18:19:23      Final result  by Hubert Michelle MD (12/30/22 18:19:23)                   Impression:      1. Left anterior cerebral artery mid to distal A2 occlusion.  If clinical concern for acute infarction, further evaluation may be obtained with MRI.  2. CTA head and neck with no additional large vessel occlusion or dissection.  This report was flagged in Epic as abnormal.      Electronically signed by: Hubert Michelle MD  Date:    12/30/2022  Time:    18:19               Narrative:    EXAMINATION:  CTA HEAD AND NECK (XPD)    CLINICAL HISTORY:  Stroke/TIA, determine embolic source;    TECHNIQUE:  CT angiogram was performed from the level of the meghan to the top of the head following the IV administration of 85mL of Omnipaque 350.   Sagittal and coronal reconstructions and maximum intensity projection reconstructions were performed. Arterial stenosis percentages are based on NASCET measurement criteria.    COMPARISON:  Noncontrast CT head from the same date at 15:29.    FINDINGS:  Redemonstration of parenchymal hypoattenuation within the left frontal lobe as seen on earlier noncontrast CT.  No evidence of abnormal parenchymal contrast enhancement.    CTA Neck: The origins of the right brachiocephalic, left common carotid and left subclavian arteries from the arch are within normal limits.  The origins of the vertebral arteries are within normal limits.  The vertebral arteries are unremarkable throughout their course without evidence for focal stenosis or occlusion.   The bilateral common carotid arteries and internal carotid arteries are patent without evidence for focal stenosis or occlusion.  There is medial deviated course of the right ICA.  No evidence of dissection involving the bilateral carotid and vertebral arteries.    Anterior circulation: The bilateral distal cervical, petrous, cavernous, and supraclinoid ICAs are patent without significant stenosis or aneurysm.  Atherosclerotic plaquing of the cavernous segments of the ICAs.   The right anterior cerebral artery and bilateral middle cerebral arteries are patent without significant stenosis, occlusion, or aneurysm.  Narrowing and attenuation are seen of the proximal left anterior cerebral artery A2 segment with occlusion seen distally.    Posterior circulation: Distal vertebral arteries, basilar artery and posterior cerebral arteries are patent without significant focal stenosis, occlusion, or aneurysm.  Prominent left-sided posterior communicating artery is seen which may supply majority of flow to the left PCA.    Airways: Unremarkable.    Glands/Nodes: The parotid, submandibular, and thyroid glands are unremarkable.    Spine: No acute osseous abnormality identified.  Multilevel cervical spine anterior spurring is seen.    Lungs: Visualized lung apices are clear.                                        CT Head Without Contrast (Final result)  Result time 12/30/22 15:58:15      Final result by Mango Santos MD (12/30/22 15:58:15)                   Impression:      Findings concerning for recent infarction involving the left frontal lobe and insular ribbon, as above.  No intracranial hemorrhage.    Sequela of mild senescent and chronic microvascular ischemic change with stable left caudate remote lacunar type infarct.    This report was flagged in Epic as abnormal.    COMMUNICATION  This critical result was discovered/received at 1537 hours.  The critical information above was relayed directly by me by telephone to Dr. Yeison Spicer in the emergency department on 12/30/2022 at 15:45 hours.      Electronically signed by: Mango Santos MD  Date:    12/30/2022  Time:    15:58               Narrative:    EXAMINATION:  CT HEAD WITHOUT CONTRAST    CLINICAL HISTORY:  Neuro deficit, acute, stroke suspected;    TECHNIQUE:  Low dose axial CT images obtained throughout the head without intravenous contrast. Sagittal and coronal reconstructions were performed.    COMPARISON:  Head CT  06/17/2021    FINDINGS:  Intracranial compartment:    Age-related mild generalized cerebral volume loss.  Ventricles are midline and stable in size and configuration without distortion by mass effect or acute hydrocephalus.  No extra-axial blood or fluid collections.    New area of hypoattenuation involving the subcortical white matter and cortex of the posterior and inferior left frontal lobe and also anterior aspect of the left insular ribbon with mild effacement of the associated cortical sulci consistent with recent infarct, likely acute.  No associated hemorrhagic component.    Left caudate small remote lacunar type infarct unchanged.  Grossly stable mild periventricular white matter elsewhere consistent with chronic microvascular ischemic change.  No parenchymal mass, hemorrhage, edema or midline shift.  Skull base atherosclerotic vascular calcifications noted.    Skull/extracranial contents (limited evaluation): No fracture. Hyperostosis frontalis interna.  Mastoid air cells are clear.  Mild patchy mucosal thickening of the paranasal sinuses without air-fluid levels.                                       Medications   albuterol inhaler 2 puff (has no administration in time range)   FLUoxetine capsule 20 mg (has no administration in time range)   levothyroxine tablet 50 mcg (has no administration in time range)   sodium chloride 0.9% flush 10 mL (has no administration in time range)   sodium chloride 0.9% bolus 500 mL 500 mL (has no administration in time range)   heparin (porcine) injection 5,000 Units (5,000 Units Subcutaneous Given 12/30/22 2256)   clopidogreL tablet 75 mg (has no administration in time range)   atorvastatin tablet 40 mg (has no administration in time range)   aspirin EC tablet 81 mg (has no administration in time range)   labetalol 20 mg/4 mL (5 mg/mL) IV syring (10 mg Intravenous Given 12/30/22 2255)   melatonin tablet 6 mg (has no administration in time range)   QUEtiapine tablet 150 mg  (0 mg Oral Hold 12/30/22 2345)   hydrALAZINE injection 10 mg (has no administration in time range)   aspirin tablet 325 mg (325 mg Oral Given 12/30/22 1806)   potassium chloride 10 mEq in 100 mL IVPB (0 mEq Intravenous Stopped 12/30/22 1925)   clopidogreL tablet 300 mg (300 mg Oral Given 12/30/22 1806)   iohexoL (OMNIPAQUE 350) injection 85 mL (85 mLs Intravenous Given 12/30/22 1755)   HYDROmorphone injection 0.5 mg (0.5 mg Intravenous Given 12/30/22 1948)     Medical Decision Making:   Initial Assessment:   72-year-old female presents the ED as a transfer from [facility] for stat MRI for left KEITH occlusion.  ABC's intact.  Physical exam reveals weakness (3/5) but otherwise unremarkable.  Differential Diagnosis:   CVA  Electrolyte abnormality  Anemia  ED Management:  See ED course.          Attending Attestation:             Attending ED Notes:   Attending Note:  I have seen the patient, have repeated the key portions of the history and physical, reviewed and agree with the medical documentation, and supervised and managed the medical care of the patient. Additionally, I was present for the critical portion of any procedure(s) performed.      72 F hx above transferred from H for AMS, acute stroke.  No LVO on arrival, not interventional candidate  MRI concerning for acute/subacute left corona radiata infarct.   Pt admitted to vascular neurology.    HOLLY Carson MD  Staff ED Physician  12/31/2022 6:45 PM          ED Course as of 12/31/22 0130   Fri Dec 30, 2022   2127 Vascular neurology came down to see the patient.  Stat MRI without contrast ordered. [BG]   2215 Patient admitted as an inpatient to vascular neurology service for stroke and further workup. [BG]      ED Course User Index  [BG] Liliane Guzman MD                   Clinical Impression:   Final diagnoses:  [I63.9] Stroke (Primary)        ED Disposition Condition    Admit                 Liliane Guzman MD  Resident  12/31/22 0130       Marielos  MD Alli  12/31/22 [id number]

## 2022-12-31 NOTE — ASSESSMENT & PLAN NOTE
on importance of medication compliance   Check that prescriptions are affordable to patient/ adequate financial assistance in place prior to discharge

## 2022-12-31 NOTE — PROGRESS NOTES
[facility] - Neurosurgery ([facility])  Vascular Neurology  Comprehensive Stroke Center  Progress Note    Assessment/Plan:     * Cerebral infarction due to embolism of left middle cerebral artery  73 yo F with PMH of HTN, DM, obesity, and noncompliance with medications. Patient was noted to have abnormal behavior around 1:30pm today. This was followed by chest pain, abdominal pain, and HA. Family called EMS, and patient was taken to [facility] ED. SBP on arrival 201. Patient was seen via Telestroke by Dr. Bower. NIHSS at that 5. CTH with hypodensity in L insular cortex. Not eligible for TNK. CTA H/N with L KEITH mid to distal A2 occlusion. Patient transferred to [facility] for possible intervention and higher level of care. On arrival to [facility] NIHSS 8 initially. New RLE weakness and dysarthria from previous NIHSS 5 at OSH. Of note patient was given dilaudid prior to transfer. MRI ischemic with L MCA infarct. IR reviewed imaging, possible chronic L A2 occlusion. Not IR candidate. Was admitted to NPU.     Patient with improved NIHSS today. RLE weakness resolved. Aphasia and dysarthria improved/resolved. Some sensation deficits on R when compared to L . Facial droop still present. Alert and oriented x 3. TTE pending. Therapy with recs for SNF.     Antithrombotics for secondary stroke prevention: Antiplatelets: Aspirin: 81 mg daily  Clopidogrel: 75 mg daily (loaded with DAPT at OSH)     Statins for secondary stroke prevention and hyperlipidemia, if present:   Statins: Atorvastatin- 40 mg daily, LDL goal <70 long term     Aggressive risk factor modification: HTN, DM, HLD, Diet, Exercise, Obesity     Rehab efforts: SNF     Diagnostics ordered/pending: TTE to assess cardiac function/status     VTE prophylaxis: Heparin 5000 units SQ every 8 hours  Mechanical prophylaxis: Place SCDs    BP parameters: Infarct: No intervention, SBP <220        History of medication noncompliance   on importance of medication compliance    Check that prescriptions are affordable to patient/ adequate financial assistance in place prior to discharge     BMI 34.0-34.9,adult  Stroke Risk Factor   Recommend counseling on diet and exercise when appropriate     Type 2 diabetes mellitus  Stroke Risk Factor    A1C 6   IP goal for glucose 140-180   SSI     Essential hypertension  Stroke Risk Factor   SBP < 220 for acute infarct without intervention  1/2 dose of home valsartan started today for pressures consistently above goal despite PRN pushes   Will titrate as needed     Tobacco abuse   on cessation when appropriate   Consider smoking cessation referral when discharged        12/31/2022 Patient c/o R calf pain today, US r/o DVT. 1/2 dose of home valsartan was started this morning for SBP > 220 despite PRN pushes. Goal < 220, will continue to monitor. TTE pending. EKG obtained for evaluation of Qtc given higher dose of seroquel at home. Therapy recommendations for SNF. On DAPT.         STROKE DOCUMENTATION   Acute Stroke Times   Last Known Normal Date: 12/30/22  Last Known Normal Time: 1330  Symptom Onset Date: 12/30/22  Symptom Onset Time: 1330  Stroke Team Called Date: 12/30/22  Stroke Team Called Time: 2104  Stroke Team Arrival Date: 12/30/22  Stroke Team Arrival Time: 2106  CT Interpretation Time: 1538  Thrombolytic Therapy Recommended: No  CTA Interpretation Time:  (Pending, patient lost access on initial attempt)  Thrombectomy Recommended: No  MRI Acute Stroke Protocol Interpretation Time: 2130    NIH Scale:  1a. Level of Consciousness: 0-->Alert, keenly responsive  1b. LOC Questions: 0-->Answers both questions correctly  1c. LOC Commands: 0-->Performs both tasks correctly  2. Best Gaze: 0-->Normal  3. Visual: 0-->No visual loss  4. Facial Palsy: 1-->Minor paralysis (flattened nasolabial fold, asymmetry on smiling)  5a. Motor Arm, Left: 0-->No drift, limb holds 90 (or 45) degrees for full 10 secs  5b. Motor Arm, Right: 1-->Drift, limb holds 90  (or 45) degrees, but drifts down before full 10 secs, does not hit bed or other support  6a. Motor Leg, Left: 0-->No drift, leg holds 30 degree position for full 5 secs  6b. Motor Leg, Right: 0-->No drift, leg holds 30 degree position for full 5 secs  7. Limb Ataxia: 0-->Absent  8. Sensory: 1-->Mild-to-moderate sensory loss, patient feels pinprick is less sharp or is dull on the affected side, or there is a loss of superficial pain with pinprick, but patient is aware of being touched  9. Best Language: 0-->No aphasia, normal  10. Dysarthria: 0-->Normal  11. Extinction and Inattention (formerly Neglect): 0-->No abnormality  Total (NIH Stroke Scale): 3       Modified [place] Score: 2  [place] Coma Scale:    ABCD2 Score:    [id number] Score:   HAS -BLED Score:   ICH Score:   Hunt & Ulloa Classification:      Hemorrhagic change of an Ischemic Stroke: Does this patient have an ischemic stroke with hemorrhagic changes? No     Neurologic Chief Complaint: HA and L A2 occlusion     Subjective:     Interval History: Patient is seen for follow-up neurological assessment and treatment recommendations:     Patient c/o R calf pain today, US r/o DVT. 1/2 dose of home valsartan was started this morning for SBP > 220 despite PRN pushes. Goal < 220, will continue to monitor. TTE pending. EKG obtained for evaluation of Qtc given higher dose of seroquel at home. Therapy recommendations for SNF. On DAPT.     HPI, Past Medical, Family, and Social History remains the same as documented in the initial encounter.     Review of Systems   Constitutional:  Negative for fever.   HENT:  Negative for drooling.    Eyes:  Negative for visual disturbance.   Respiratory:  Negative for cough.    Gastrointestinal:  Negative for diarrhea and vomiting.   Genitourinary:  Negative for difficulty urinating.   Musculoskeletal:         R calf pain    Neurological:  Positive for facial asymmetry, weakness and numbness. Negative for speech difficulty.    Psychiatric/Behavioral:  Negative for agitation, behavioral problems and confusion.    Scheduled Meds:   aspirin  81 mg Oral Daily    atorvastatin  40 mg Oral Daily    clopidogreL  75 mg Oral Daily    FLUoxetine  20 mg Oral Daily    heparin (porcine)  5,000 Units Subcutaneous Q8H    hydrALAZINE  10 mg Intravenous Once    levothyroxine  50 mcg Oral Before breakfast    QUEtiapine  150 mg Oral Nightly    valsartan  80 mg Oral Daily     Continuous Infusions:   sodium chloride 0.9%       PRN Meds:acetaminophen, albuterol, labetaloL, melatonin, sodium chloride 0.9%, sodium chloride 0.9%    Objective:     Vital Signs (Most Recent):  Temp: 98.1 °F (36.7 °C) (12/31/22 1235)  Pulse: 81 (12/31/22 1235)  Resp: 18 (12/31/22 1235)  BP: (!) 199/91 (12/31/22 1235)  SpO2: 97 % (12/31/22 1235)  BP Location: Right arm    Vital Signs Range (Last 24H):  Temp:  [96.2 °F (35.7 °C)-98.8 °F (37.1 °C)]   Pulse:  [73-98]   Resp:  [16-22]   BP: (186-241)/()   SpO2:  [90 %-100 %]   BP Location: Right arm    Physical Exam  Vitals and nursing note reviewed.   Constitutional:       Appearance: She is obese. She is not ill-appearing.   HENT:      Head: Normocephalic and atraumatic.      Nose: No rhinorrhea.   Eyes:      General: No scleral icterus.     Extraocular Movements: Extraocular movements intact.   Cardiovascular:      Rate and Rhythm: Normal rate.   Pulmonary:      Effort: Pulmonary effort is normal. No respiratory distress.   Musculoskeletal:         General: No tenderness.   Skin:     General: Skin is warm and dry.   Neurological:      Mental Status: She is alert and oriented to person, place, and time.      Sensory: Sensory deficit present.      Motor: Weakness (improving) present.      Comments: Very mild RUE drift   RLE weakness improved/resolved from yesterday   Facial droop present   Alert and oriented x3      Psychiatric:         Behavior: Behavior is cooperative.       Neurological Exam:   LOC: alert  Attention  Span: Good   Language: No aphasia  Articulation: No dysarthria  Orientation: Person, Place, Time   Visual Fields: Full  EOM (CN III, IV, VI): Full/intact  Facial Movement (CN VII): Lower facial weakness on the Right  Motor: Arm left  Normal 5/5  Leg left  Normal 5/5  Arm right  Paresis: 4/5  Leg right Paresis: 4/5  Sensation: Slightly decreased on R side with simultaneous testing     Laboratory:  CMP:   Recent Labs   Lab 12/31/22  0523   CALCIUM 9.0   ALBUMIN 3.6   PROT 7.6      K 3.4*   CO2 23      BUN 6*   CREATININE 0.6   ALKPHOS 88   ALT 16   AST 18   BILITOT 0.3     BMP:   Recent Labs   Lab 12/31/22  0523      K 3.4*      CO2 23   BUN 6*   CREATININE 0.6   CALCIUM 9.0     CBC:   Recent Labs   Lab 12/31/22  0523   WBC 6.19   RBC 4.53   HGB 13.0   HCT 40.9      MCV 90   MCH 28.7   MCHC 31.8*     Lipid Panel:   Recent Labs   Lab 12/30/22  1603   CHOL 246*   LDLCALC 170.0*   HDL 59   TRIG 85     Coagulation:   Recent Labs   Lab 12/31/22  0523   INR 1.0   APTT 28.9     Platelet Aggregation Study: No results for input(s): PLTAGG, PLTAGINTERP, PLTAGREGLACO, ADPPLTAGGREG in the last 168 hours.  Hgb A1C:   Recent Labs   Lab 12/30/22  2304   HGBA1C 6.0*     TSH:   Recent Labs   Lab 12/30/22  1603   TSH 1.899       Diagnostic Results     Brain imaging:  MRI Brain WO Contrast 12/30/22  1. Small focus of acute/subacute lacunar infarction in the left corona radiata.  Recommend follow-up.  2. Involutional changes with chronic microvascular ischemic changes.  Remote left frontal cortical infarct.  3. Mild paranasal sinus disease.     CTH 12/30/22   Findings concerning for recent infarction involving the left frontal lobe and insular ribbon, as above. No intracranial hemorrhage. Sequela of mild senescent and chronic microvascular ischemic change with stable left caudate remote lacunar type infarct.     Vessel Imaging:  CTA H/N 12/30/22   1. Left anterior cerebral artery mid to distal A2 occlusion.   If clinical concern for acute infarction, further evaluation may be obtained with MRI.  2. CTA head and neck with no additional large vessel occlusion or dissection.    Cardiac Imaging   TTE  pending       David Medina PA-C  [facility]  Department of Vascular Neurology   [facility] - Neurosurgery [facility])

## 2022-12-31 NOTE — H&P
[facility]eep Dow - Emergency Dept  Vascular Neurology  Comprehensive Stroke Center  History & Physical    Inpatient consult to Vascular (Stroke) Neurology  Consult performed by: David Medina PA-C  Consult ordered by: Marielos Carson MD        Assessment/Plan:     Patient is a 72 y.o. year old female with:    * Cerebral infarction due to embolism of left middle cerebral artery  71 yo F with PMH of HTN, DM, obesity, and noncompliance with medications. Patient was noted to have abnormal behavior around 1:30pm today. This was followed by chest pain, abdominal pain, and HA. Family called EMS, and patient was taken to [facility] ED. SBP on arrival 201. Patient was seen via Telestroke by Dr. Bower. NIHSS at that 5. CTH with hypodensity in L insular cortex. Not eligible for TNK. CTA H/N with L KEITH mid to distal A2 occlusion. Patient transferred to [facility] for possible intervention and higher level of care.     On arrival NIHSS 8. New RLE weakness and dysarthria from previous NIHSS 5 at OSH. Of note patient was given dilaudid prior to transfer. MRI ischemic with L MCA infarct. IR reviewed imaging, possible chronic L A2 occlusion. Not IR candidate. Will be admitted to NPU.     Antithrombotics for secondary stroke prevention: Antiplatelets: Aspirin: 81 mg daily  Clopidogrel: 75 mg daily (loaded with DAPT at OSH)     Statins for secondary stroke prevention and hyperlipidemia, if present:   Statins: Atorvastatin- 40 mg daily, LDL goal <70 long term     Aggressive risk factor modification: HTN, DM, HLD, Diet, Exercise, Obesity     Rehab efforts: The patient has been evaluated by a stroke team provider and the therapy needs have been fully considered based off the presenting complaints and exam findings. The following therapy evaluations are needed: PT evaluate and treat, OT evaluate and treat, SLP evaluate and treat, PM&R evaluate for appropriate placement    Diagnostics ordered/pending: HgbA1C to assess blood glucose levels, TTE to assess  cardiac function/status , TSH to assess thyroid function    VTE prophylaxis: Heparin 5000 units SQ every 8 hours  Mechanical prophylaxis: Place SCDs    BP parameters: Infarct: No intervention, SBP <220        History of medication noncompliance   on importance of medication compliance   Check that prescriptions are affordable to patient/ adequate financial assistance in place prior to discharge     BMI 34.0-34.9,adult  Stroke Risk Factor   Recommend counseling on diet and exercise when appropriate     Type 2 diabetes mellitus  Stroke Risk Factor   Recommend obtaining updated A1C   IP goal for glucose 140-180   SSI     Essential hypertension  Stroke Risk Factor   SBP < 220 for acute infarct without intervention    Tobacco abuse   on cessation when appropriate   Consider smoking cessation referral when discharged         STROKE DOCUMENTATION     Acute Stroke Times   Last Known Normal Date: 12/30/22  Last Known Normal Time: 1330  Symptom Onset Date: 12/30/22  Symptom Onset Time: 1330  Stroke Team Called Date: 12/30/22  Stroke Team Called Time: 2104  Stroke Team Arrival Date: 12/30/22  Stroke Team Arrival Time: 2106  CT Interpretation Time: 1538  Thrombolytic Therapy Recommended: No  CTA Interpretation Time:  (Pending, patient lost access on initial attempt)  Thrombectomy Recommended: No  MRI Acute Stroke Protocol Interpretation Time: 2130    NIH Scale:  1a. Level of Consciousness: 1-->Not alert, but arousable by minor stimulation to obey, answer, or respond  1b. LOC Questions: 1-->Answers one question correctly  1c. LOC Commands: 0-->Performs both tasks correctly  2. Best Gaze: 0-->Normal  3. Visual: 0-->No visual loss  4. Facial Palsy: 1-->Minor paralysis (flattened nasolabial fold, asymmetry on smiling)  5a. Motor Arm, Left: 0-->No drift, limb holds 90 (or 45) degrees for full 10 secs  5b. Motor Arm, Right: 1-->Drift, limb holds 90 (or 45) degrees, but drifts down before full 10 secs, does not hit bed  or other support  6a. Motor Leg, Left: 0-->No drift, leg holds 30 degree position for full 5 secs  6b. Motor Leg, Right: 2-->Some effort against gravity, leg falls to bed by 5 secs, but has some effort against gravity  7. Limb Ataxia: 0-->Absent  8. Sensory: 0-->Normal, no sensory loss  9. Best Language: 1-->Mild-to-moderate aphasia, some obvious loss of fluency or facility of comprehension, without significant limitation on ideas expressed or form of expression. Reduction of speech and/or comprehension, however, makes conversation. . . (see row details)  10. Dysarthria: 1-->Mild-to-moderate dysarthria, patient slurs at least some words and, at worst, can be understood with some difficulty  11. Extinction and Inattention (formerly Neglect): 0-->No abnormality  Total (NIH Stroke Scale): 8     Modified [place] Score: 2  [place] Coma Scale:    ABCD2 Score:    [id number] Score:   HAS -BLED Score:   ICH Score:   Hunt & Ulloa Classification:      Thrombolysis Candidate? No, Out of window - Symptom onset > 4.5 hours, CT findings (ICH, SAH, Large core infarct)     Delays to Thrombolysis?  Not Applicable    Interventional Revascularization Candidate?   Is the patient eligible for mechanical endovascular reperfusion (IRAIS)?  No; at this time symptoms not suggestive of large vessel occlusion and chronic occlusion     Delays to Thrombectomy? Not Applicable    Hemorrhagic change of an Ischemic Stroke: Does this patient have an ischemic stroke with hemorrhagic changes? No         Subjective:     History of Present Illness:  Ms. Khan is a 71 yo F with PMH of HTN, DM, obesity, and noncompliance with medications. Patient was noted to have abnormal behavior around 1:30pm today. This was followed by chest pain, abdominal pain, and HA. Family called EMS, and patient was taken to [facility] ED. SBP on arrival 201. Patient was seen via Telestroke by Dr. Bower. NIHSS at that 5. CTH with hypodensity in L insular cortex. Not eligible for TNK.  CTA H/N with L KEITH mid to distal A2 occlusion. Patient transferred to main [place] for possible intervention and higher level of care. On arrival patient with NIHSS of 8. Mild dysarthria and RLE weakness noted (new from WB exam). Of note patient received 0.5mg of Dilaudid at OSH prior to arrival.          Past Medical History:   Diagnosis Date    COPD (chronic obstructive pulmonary disease)     Diabetes mellitus     GERD (gastroesophageal reflux disease)     Hyperlipemia, mixed     Hypertension     Major depression     Obesity     Osteoarthritis     Thyroid disease     Tobacco abuse     Urinary incontinence, functional     Vitamin D deficiency      Past Surgical History:   Procedure Laterality Date    HYSTERECTOMY       No pertinent family history obtained during this encounter.   Social History     Tobacco Use    Smoking status: Every Day     Packs/day: 1.00     Types: Cigarettes    Smokeless tobacco: Never   Substance Use Topics    Alcohol use: Yes     Review of patient's allergies indicates:  No Known Allergies    Medications: I have reviewed the current medication administration record.    (Not in a hospital admission)      Review of Systems   Constitutional:  Negative for fever.   HENT:  Negative for drooling and trouble swallowing.    Eyes:  Negative for visual disturbance.   Respiratory:  Negative for choking and shortness of breath.    Cardiovascular:  Positive for chest pain.   Gastrointestinal:  Positive for abdominal pain. Negative for vomiting.   Genitourinary:  Negative for difficulty urinating.   Musculoskeletal:  Negative for gait problem.   Neurological:  Positive for speech difficulty, weakness and headaches.   Psychiatric/Behavioral:  Positive for confusion.    Objective:     Vital Signs (Most Recent):  Temp: 98 °F (36.7 °C) (12/30/22 1924)  Pulse: 87 (12/30/22 2029)  Resp: (!) 22 (12/30/22 2029)  BP: (!) 195/81 (12/30/22 1915)  SpO2: 99 % (12/30/22 2029)    Vital Signs Range (Last  24H):  Temp:  [97.7 °F (36.5 °C)-98 °F (36.7 °C)]   Pulse:  [83-98]   Resp:  [16-22]   BP: (186-226)/()   SpO2:  [94 %-100 %]     Physical Exam  Vitals and nursing note reviewed.   Constitutional:       Appearance: She is obese.   HENT:      Head: Normocephalic and atraumatic.      Nose: No rhinorrhea.   Eyes:      General: No scleral icterus.     Extraocular Movements: Extraocular movements intact.   Cardiovascular:      Rate and Rhythm: Normal rate.   Pulmonary:      Effort: Pulmonary effort is normal. No respiratory distress.   Abdominal:      General: There is no distension.   Musculoskeletal:         General: No tenderness.   Skin:     General: Skin is warm and dry.   Neurological:      Mental Status: She is alert. She is disoriented.      Cranial Nerves: Facial asymmetry present.      Motor: Weakness present.   Psychiatric:         Behavior: Behavior is cooperative.       Neurological Exam:   LOC: alert  Attention Span: Good   Language: Mild expressive aphasia   Articulation: No dysarthria  Orientation: Not oriented to time  Visual Fields: Full  EOM (CN III, IV, VI): Full/intact  Facial Movement (CN VII): Lower facial weakness on the Right  Motor: Arm left  Normal 5/5  Leg left  Normal 5/5  Arm right  Paresis: 4/5  Leg right Normal 5/5  Sensation: Intact to light touch       Laboratory:  CMP:   Recent Labs   Lab 12/30/22  1603   CALCIUM 9.3   ALBUMIN 3.9   PROT 8.4      K 3.2*   CO2 21*      BUN 9   CREATININE 0.8   ALKPHOS 90   ALT 15   AST 16   BILITOT 0.2     CBC:   Recent Labs   Lab 12/30/22  1603 12/30/22  1604   WBC 6.49  --    RBC 4.61  --    HGB 13.4  --    HCT 40.4 45     --    MCV 88  --    MCH 29.1  --    MCHC 33.2  --      Lipid Panel:   Recent Labs   Lab 12/30/22  1603   CHOL 246*   LDLCALC 170.0*   HDL 59   TRIG 85     Coagulation:   Recent Labs   Lab 12/30/22  1603   INR 1.0     Hgb A1C: No results for input(s): HGBA1C in the last 168 hours.  TSH:   Recent Labs   Lab  12/30/22  1603   TSH 1.899       Diagnostic Results:      Brain imaging:  MRI Brain WO Contrast pending     CTH 12/30/22   Findings concerning for recent infarction involving the left frontal lobe and insular ribbon, as above. No intracranial hemorrhage. Sequela of mild senescent and chronic microvascular ischemic change with stable left caudate remote lacunar type infarct.    Vessel Imaging:  CTA H/N 12/30/22   1. Left anterior cerebral artery mid to distal A2 occlusion.  If clinical concern for acute infarction, further evaluation may be obtained with MRI.  2. CTA head and neck with no additional large vessel occlusion or dissection.            David Medina PA-C  Comprehensive Stroke Center  Department of Vascular Neurology   [facility] - Emergency Dept

## 2022-12-31 NOTE — CONSULTS
Nutrition-Related Cardiac Education      Time Spent: 10 min    Learners: Pt     Nutrition Education with handouts: Educated pt on fluid and salt restriction diet for people with HF. Pt verbalized understanding. Emphasized the importance of diet adherence. Pt with no additional questions. No other needs identified. Left all education material with pt at bedside.    Comments: Wt stable. Denies any significant wt changes. No indicators of malnutrition.    Barriers to Learning: No    Follow up: Yes    Please consult as needed.  Thank you!

## 2022-12-31 NOTE — CONSULTS
Inpatient consult to Physical Medicine Rehab  Consult performed by: Homa Mallory NP  Consult ordered by: David Medina PA-C    Consult received.  Reviewed patient history and current admission.  PM&R following.    Homa Mallory NP  Physical Medicine & Rehabilitation   12/31/2022

## 2022-12-31 NOTE — ASSESSMENT & PLAN NOTE
73 yo F with PMH of HTN, DM, obesity, and noncompliance with medications. Patient was noted to have abnormal behavior around 1:30pm today. This was followed by chest pain, abdominal pain, and HA. Family called EMS, and patient was taken to [facility] ED. SBP on arrival 201. Patient was seen via Telestroke by Dr. Bower. NIHSS at that 5. CTH with hypodensity in L insular cortex. Not eligible for TNK. CTA H/N with L KEITH mid to distal A2 occlusion. Patient transferred to [facility] for possible intervention and higher level of care. On arrival to [facility] NIHSS 8 initially. New RLE weakness and dysarthria from previous NIHSS 5 at OSH. Of note patient was given dilaudid prior to transfer. MRI ischemic with L MCA infarct. IR reviewed imaging, possible chronic L A2 occlusion. Not IR candidate. Was admitted to NPU.     Patient with improved NIHSS today. RLE weakness resolved. Aphasia and dysarthria improved/resolved. Some sensation deficits on R when compared to L . Facial droop still present. Alert and oriented x 3. TTE pending. Therapy with recs for SNF.     Antithrombotics for secondary stroke prevention: Antiplatelets: Aspirin: 81 mg daily  Clopidogrel: 75 mg daily (loaded with DAPT at OSH)     Statins for secondary stroke prevention and hyperlipidemia, if present:   Statins: Atorvastatin- 40 mg daily, LDL goal <70 long term     Aggressive risk factor modification: HTN, DM, HLD, Diet, Exercise, Obesity     Rehab efforts: SNF     Diagnostics ordered/pending: TTE to assess cardiac function/status     VTE prophylaxis: Heparin 5000 units SQ every 8 hours  Mechanical prophylaxis: Place SCDs    BP parameters: Infarct: No intervention, SBP <220

## 2023-01-01 LAB
ALBUMIN SERPL BCP-MCNC: 3.4 G/DL (ref 3.5–5.2)
ALP SERPL-CCNC: 80 U/L (ref 55–135)
ALT SERPL W/O P-5'-P-CCNC: 11 U/L (ref 10–44)
ANION GAP SERPL CALC-SCNC: 11 MMOL/L (ref 8–16)
AST SERPL-CCNC: 14 U/L (ref 10–40)
BASOPHILS # BLD AUTO: 0.02 K/UL (ref 0–0.2)
BASOPHILS NFR BLD: 0.4 % (ref 0–1.9)
BILIRUB SERPL-MCNC: 0.7 MG/DL (ref 0.1–1)
BUN SERPL-MCNC: 10 MG/DL (ref 8–23)
CALCIUM SERPL-MCNC: 9.4 MG/DL (ref 8.7–10.5)
CHLORIDE SERPL-SCNC: 105 MMOL/L (ref 95–110)
CO2 SERPL-SCNC: 24 MMOL/L (ref 23–29)
CREAT SERPL-MCNC: 0.7 MG/DL (ref 0.5–1.4)
DIFFERENTIAL METHOD: ABNORMAL
EOSINOPHIL # BLD AUTO: 0.2 K/UL (ref 0–0.5)
EOSINOPHIL NFR BLD: 3.9 % (ref 0–8)
ERYTHROCYTE [DISTWIDTH] IN BLOOD BY AUTOMATED COUNT: 14.7 % (ref 11.5–14.5)
EST. GFR  (NO RACE VARIABLE): >60 ML/MIN/1.73 M^2
GLUCOSE SERPL-MCNC: 113 MG/DL (ref 70–110)
HCT VFR BLD AUTO: 39.7 % (ref 37–48.5)
HGB BLD-MCNC: 12.5 G/DL (ref 12–16)
IMM GRANULOCYTES # BLD AUTO: 0.01 K/UL (ref 0–0.04)
IMM GRANULOCYTES NFR BLD AUTO: 0.2 % (ref 0–0.5)
LYMPHOCYTES # BLD AUTO: 2.2 K/UL (ref 1–4.8)
LYMPHOCYTES NFR BLD: 43.6 % (ref 18–48)
MCH RBC QN AUTO: 28.3 PG (ref 27–31)
MCHC RBC AUTO-ENTMCNC: 31.5 G/DL (ref 32–36)
MCV RBC AUTO: 90 FL (ref 82–98)
MONOCYTES # BLD AUTO: 0.4 K/UL (ref 0.3–1)
MONOCYTES NFR BLD: 7 % (ref 4–15)
NEUTROPHILS # BLD AUTO: 2.3 K/UL (ref 1.8–7.7)
NEUTROPHILS NFR BLD: 44.9 % (ref 38–73)
NRBC BLD-RTO: 0 /100 WBC
PLATELET # BLD AUTO: 250 K/UL (ref 150–450)
PMV BLD AUTO: 12 FL (ref 9.2–12.9)
POTASSIUM SERPL-SCNC: 3.1 MMOL/L (ref 3.5–5.1)
PROT SERPL-MCNC: 7.2 G/DL (ref 6–8.4)
RBC # BLD AUTO: 4.41 M/UL (ref 4–5.4)
SODIUM SERPL-SCNC: 140 MMOL/L (ref 136–145)
WBC # BLD AUTO: 5.12 K/UL (ref 3.9–12.7)

## 2023-01-01 PROCEDURE — 63600175 PHARM REV CODE 636 W HCPCS: Performed by: PHYSICIAN ASSISTANT

## 2023-01-01 PROCEDURE — 94761 N-INVAS EAR/PLS OXIMETRY MLT: CPT

## 2023-01-01 PROCEDURE — 85025 COMPLETE CBC W/AUTO DIFF WBC: CPT | Performed by: PHYSICIAN ASSISTANT

## 2023-01-01 PROCEDURE — 99233 PR SUBSEQUENT HOSPITAL CARE,LEVL III: ICD-10-PCS | Mod: ,,, | Performed by: PSYCHIATRY & NEUROLOGY

## 2023-01-01 PROCEDURE — 99233 SBSQ HOSP IP/OBS HIGH 50: CPT | Mod: ,,, | Performed by: PSYCHIATRY & NEUROLOGY

## 2023-01-01 PROCEDURE — 25000003 PHARM REV CODE 250: Performed by: PHYSICIAN ASSISTANT

## 2023-01-01 PROCEDURE — 25000003 PHARM REV CODE 250: Performed by: NURSE PRACTITIONER

## 2023-01-01 PROCEDURE — 80053 COMPREHEN METABOLIC PANEL: CPT | Performed by: PHYSICIAN ASSISTANT

## 2023-01-01 PROCEDURE — 27000221 HC OXYGEN, UP TO 24 HOURS

## 2023-01-01 PROCEDURE — 11000001 HC ACUTE MED/SURG PRIVATE ROOM

## 2023-01-01 PROCEDURE — 36415 COLL VENOUS BLD VENIPUNCTURE: CPT | Performed by: PHYSICIAN ASSISTANT

## 2023-01-01 RX ORDER — HYDROCHLOROTHIAZIDE 12.5 MG/1
12.5 TABLET ORAL ONCE
Status: DISCONTINUED | OUTPATIENT
Start: 2023-01-01 | End: 2023-01-01

## 2023-01-01 RX ORDER — HYDROCHLOROTHIAZIDE 12.5 MG/1
12.5 TABLET ORAL NIGHTLY
Status: DISCONTINUED | OUTPATIENT
Start: 2023-01-01 | End: 2023-01-02

## 2023-01-01 RX ORDER — LIDOCAINE 50 MG/G
1 PATCH TOPICAL DAILY PRN
Status: DISCONTINUED | OUTPATIENT
Start: 2023-01-01 | End: 2023-01-01

## 2023-01-01 RX ORDER — VALSARTAN 40 MG/1
160 TABLET ORAL DAILY
Status: DISCONTINUED | OUTPATIENT
Start: 2023-01-02 | End: 2023-01-06 | Stop reason: HOSPADM

## 2023-01-01 RX ORDER — LIDOCAINE 50 MG/G
1 PATCH TOPICAL ONCE
Status: COMPLETED | OUTPATIENT
Start: 2023-01-01 | End: 2023-01-02

## 2023-01-01 RX ORDER — POTASSIUM CHLORIDE 20 MEQ/1
40 TABLET, EXTENDED RELEASE ORAL ONCE
Status: COMPLETED | OUTPATIENT
Start: 2023-01-01 | End: 2023-01-01

## 2023-01-01 RX ADMIN — LABETALOL HYDROCHLORIDE 10 MG: 5 INJECTION, SOLUTION INTRAVENOUS at 06:01

## 2023-01-01 RX ADMIN — HEPARIN SODIUM 5000 UNITS: 5000 INJECTION INTRAVENOUS; SUBCUTANEOUS at 03:01

## 2023-01-01 RX ADMIN — VALSARTAN 80 MG: 40 TABLET, FILM COATED ORAL at 10:01

## 2023-01-01 RX ADMIN — ASPIRIN 81 MG: 81 TABLET, COATED ORAL at 10:01

## 2023-01-01 RX ADMIN — CLOPIDOGREL BISULFATE 75 MG: 75 TABLET ORAL at 10:01

## 2023-01-01 RX ADMIN — FLUOXETINE 20 MG: 20 CAPSULE ORAL at 10:01

## 2023-01-01 RX ADMIN — LIDOCAINE 1 PATCH: 50 PATCH CUTANEOUS at 03:01

## 2023-01-01 RX ADMIN — HYDROCHLOROTHIAZIDE 12.5 MG: 12.5 TABLET ORAL at 09:01

## 2023-01-01 RX ADMIN — POTASSIUM CHLORIDE 40 MEQ: 1500 TABLET, EXTENDED RELEASE ORAL at 11:01

## 2023-01-01 RX ADMIN — HEPARIN SODIUM 5000 UNITS: 5000 INJECTION INTRAVENOUS; SUBCUTANEOUS at 05:01

## 2023-01-01 RX ADMIN — ATORVASTATIN CALCIUM 40 MG: 20 TABLET, FILM COATED ORAL at 10:01

## 2023-01-01 RX ADMIN — LEVOTHYROXINE SODIUM 50 MCG: 50 TABLET ORAL at 05:01

## 2023-01-01 RX ADMIN — VALSARTAN 80 MG: 40 TABLET, FILM COATED ORAL at 08:01

## 2023-01-01 RX ADMIN — HEPARIN SODIUM 5000 UNITS: 5000 INJECTION INTRAVENOUS; SUBCUTANEOUS at 09:01

## 2023-01-01 RX ADMIN — QUETIAPINE FUMARATE 150 MG: 100 TABLET ORAL at 08:01

## 2023-01-01 NOTE — PLAN OF CARE
Problem: Adult Inpatient Plan of Care  Goal: Plan of Care Review  Outcome: Ongoing, Progressing  Flowsheets (Taken 1/1/2023 0517)  Plan of Care Reviewed With: patient     Problem: Adjustment to Illness (Stroke, Ischemic/Transient Ischemic Attack)  Goal: Optimal Coping  Outcome: Ongoing, Progressing  Intervention: Support Psychosocial Response to Stroke  Flowsheets (Taken 1/1/2023 0517)  Supportive Measures:   relaxation techniques promoted   positive reinforcement provided     Problem: Fall Injury Risk  Goal: Absence of Fall and Fall-Related Injury  Outcome: Ongoing, Progressing  Intervention: Promote Injury-Free Environment  Flowsheets (Taken 1/1/2023 0517)  Safety Promotion/Fall Prevention:   Fall Risk reviewed with patient/family   Fall Risk signage in place   family to remain at bedside   nonskid shoes/socks when out of bed   instructed to call staff for mobility   supervised activity   side rails raised x 3   POC reviewed with pt at the bedside.  Needs reinforcement.  SBP monitored and managed.  See VS in the flowsheet.  Adjustment to illness ongoing.  No neuro changes.  Safety maintained.  Instructed to call for assistance.  Call light within reach.

## 2023-01-01 NOTE — ASSESSMENT & PLAN NOTE
73 yo F with PMH of HTN, DM, obesity, and noncompliance with medications. Patient was noted to have abnormal behavior around 1:30pm today. This was followed by chest pain, abdominal pain, and HA. Family called EMS, and patient was taken to [facility] ED. SBP on arrival 201. Patient was seen via Telestroke by Dr. Bower. NIHSS at that 5. CTH with hypodensity in L insular cortex. Not eligible for TNK. CTA H/N with L KEITH mid to distal A2 occlusion. Patient transferred to [facility] for possible intervention and higher level of care. On arrival to [facility] NIHSS 8 initially. New RLE weakness and dysarthria from previous NIHSS 5 at OSH. Of note patient was given dilaudid prior to transfer. MRI ischemic with L MCA infarct. IR reviewed imaging, possible chronic L A2 occlusion. Not IR candidate. Was admitted to NPU.     TTE still pending. SNF placement when medically ready following TTE interpretation.     Antithrombotics for secondary stroke prevention: Antiplatelets: Aspirin: 81 mg daily  Clopidogrel: 75 mg daily (loaded with DAPT at OSH)     Statins for secondary stroke prevention and hyperlipidemia, if present:   Statins: Atorvastatin- 40 mg daily, LDL goal <70 long term     Aggressive risk factor modification: HTN, DM, HLD, Diet, Exercise, Obesity     Rehab efforts: SNF     Diagnostics ordered/pending: TTE to assess cardiac function/status     VTE prophylaxis: Heparin 5000 units SQ every 8 hours  Mechanical prophylaxis: Place SCDs    BP parameters: Infarct: No intervention, SBP <220

## 2023-01-01 NOTE — SUBJECTIVE & OBJECTIVE
Neurologic Chief Complaint: CASTELLANOS and L A2 occlusion     Subjective:     Interval History: Patient is seen for follow-up neurological assessment and treatment recommendations:     Patient c/o R calf pain. US negative for DVT. Will apply lidocaine patch to assist with pain management along with already PRN tylenol. TTE pending. Pending SNF.     HPI, Past Medical, Family, and Social History remains the same as documented in the initial encounter.     Review of Systems   Constitutional:  Negative for fever.   HENT:  Negative for drooling.    Eyes:  Negative for visual disturbance.   Respiratory:  Negative for cough.    Gastrointestinal:  Negative for diarrhea and vomiting.   Genitourinary:  Negative for difficulty urinating.   Musculoskeletal:         R calf pain    Neurological:  Positive for facial asymmetry, weakness and numbness. Negative for speech difficulty.   Psychiatric/Behavioral:  Negative for agitation, behavioral problems and confusion.    Scheduled Meds:   aspirin  81 mg Oral Daily    atorvastatin  40 mg Oral Daily    clopidogreL  75 mg Oral Daily    FLUoxetine  20 mg Oral Daily    heparin (porcine)  5,000 Units Subcutaneous Q8H    levothyroxine  50 mcg Oral Before breakfast    LIDOcaine  1 patch Transdermal Once    QUEtiapine  150 mg Oral Nightly    valsartan  80 mg Oral BID     Continuous Infusions:   sodium chloride 0.9%       PRN Meds:acetaminophen, albuterol, labetaloL, melatonin, sodium chloride 0.9%, sodium chloride 0.9%    Objective:     Vital Signs (Most Recent):  Temp: 98.7 °F (37.1 °C) (01/01/23 1203)  Pulse: 91 (01/01/23 1203)  Resp: 17 (01/01/23 1203)  BP: (!) 179/105 (01/01/23 1203)  SpO2: (!) 93 % (01/01/23 1203)  BP Location: Right arm    Vital Signs Range (Last 24H):  Temp:  [97.6 °F (36.4 °C)-98.7 °F (37.1 °C)]   Pulse:  [73-91]   Resp:  [16-20]   BP: (140-247)/()   SpO2:  [91 %-97 %]   BP Location: Right arm    Physical Exam  Vitals and nursing note reviewed.   Constitutional:        Appearance: She is obese. She is not ill-appearing.   HENT:      Head: Normocephalic and atraumatic.      Nose: No rhinorrhea.   Eyes:      General: No scleral icterus.     Extraocular Movements: Extraocular movements intact.   Cardiovascular:      Rate and Rhythm: Normal rate.   Pulmonary:      Effort: Pulmonary effort is normal. No respiratory distress.   Musculoskeletal:         General: No tenderness.   Skin:     General: Skin is warm and dry.   Neurological:      Mental Status: She is alert and oriented to person, place, and time.      Sensory: Sensory deficit present.      Motor: Weakness (improving) present.      Comments: Very mild RUE drift   Facial droop present   Alert and oriented x3      Psychiatric:         Behavior: Behavior is cooperative.       Neurological Exam:   LOC: alert  Attention Span: Good   Language: No aphasia  Articulation: No dysarthria  Orientation: Person, Place, Time   Visual Fields: Full  EOM (CN III, IV, VI): Full/intact  Facial Movement (CN VII): Lower facial weakness on the Right  Motor: Arm left  Normal 5/5  Leg left  Normal 5/5  Arm right  Paresis: 4/5  Leg right Paresis: 4/5  Sensation: Slightly decreased on R side with simultaneous testing     Laboratory:  CMP:   Recent Labs   Lab 01/01/23  0401   CALCIUM 9.4   ALBUMIN 3.4*   PROT 7.2      K 3.1*   CO2 24      BUN 10   CREATININE 0.7   ALKPHOS 80   ALT 11   AST 14   BILITOT 0.7       BMP:   Recent Labs   Lab 01/01/23  0401      K 3.1*      CO2 24   BUN 10   CREATININE 0.7   CALCIUM 9.4       CBC:   Recent Labs   Lab 01/01/23  0401   WBC 5.12   RBC 4.41   HGB 12.5   HCT 39.7      MCV 90   MCH 28.3   MCHC 31.5*       Lipid Panel:   Recent Labs   Lab 12/30/22  1603   CHOL 246*   LDLCALC 170.0*   HDL 59   TRIG 85       Coagulation:   Recent Labs   Lab 12/31/22  0523   INR 1.0   APTT 28.9       Platelet Aggregation Study: No results for input(s): PLTAGG, PLTAGINTERP, PLTAGREGLACO, ADPPLTAGGREG in the  last 168 hours.  Hgb A1C:   Recent Labs   Lab 12/30/22  2304   HGBA1C 6.0*       TSH:   Recent Labs   Lab 12/30/22  1603   TSH 1.899         Diagnostic Results     Brain imaging:  MRI Brain WO Contrast 12/30/22  1. Small focus of acute/subacute lacunar infarction in the left corona radiata.  Recommend follow-up.  2. Involutional changes with chronic microvascular ischemic changes.  Remote left frontal cortical infarct.  3. Mild paranasal sinus disease.     CTH 12/30/22   Findings concerning for recent infarction involving the left frontal lobe and insular ribbon, as above. No intracranial hemorrhage. Sequela of mild senescent and chronic microvascular ischemic change with stable left caudate remote lacunar type infarct.     Vessel Imaging:  CTA H/N 12/30/22   1. Left anterior cerebral artery mid to distal A2 occlusion.  If clinical concern for acute infarction, further evaluation may be obtained with MRI.  2. CTA head and neck with no additional large vessel occlusion or dissection.    Cardiac Imaging   TTE  pending

## 2023-01-01 NOTE — ASSESSMENT & PLAN NOTE
Stroke Risk Factor   SBP < 220 for acute infarct without intervention  1/2 dose of home valsartan started on 12/31 for pressures consistently above goal despite PRN pushes , other 1/2 (160mg today per day) started later that evening for persistent HTN   Will continue to monitor and add HCTZ (home med) if needed today if consistently elevated pressures

## 2023-01-01 NOTE — PROGRESS NOTES
[facility] - Neurosurgery ([facility])  Vascular Neurology  Comprehensive Stroke Center  Progress Note    Assessment/Plan:     * Cerebral infarction due to embolism of left middle cerebral artery  73 yo F with PMH of HTN, DM, obesity, and noncompliance with medications. Patient was noted to have abnormal behavior around 1:30pm today. This was followed by chest pain, abdominal pain, and HA. Family called EMS, and patient was taken to [facility] ED. SBP on arrival 201. Patient was seen via Telestroke by Dr. Bower. NIHSS at that 5. CTH with hypodensity in L insular cortex. Not eligible for TNK. CTA H/N with L KEITH mid to distal A2 occlusion. Patient transferred to [facility] for possible intervention and higher level of care. On arrival to [facility] NIHSS 8 initially. New RLE weakness and dysarthria from previous NIHSS 5 at OSH. Of note patient was given dilaudid prior to transfer. MRI ischemic with L MCA infarct. IR reviewed imaging, possible chronic L A2 occlusion. Not IR candidate. Was admitted to NPU.     TTE still pending. SNF placement when medically ready following TTE interpretation.     Antithrombotics for secondary stroke prevention: Antiplatelets: Aspirin: 81 mg daily  Clopidogrel: 75 mg daily (loaded with DAPT at OSH)     Statins for secondary stroke prevention and hyperlipidemia, if present:   Statins: Atorvastatin- 40 mg daily, LDL goal <70 long term     Aggressive risk factor modification: HTN, DM, HLD, Diet, Exercise, Obesity     Rehab efforts: SNF     Diagnostics ordered/pending: TTE to assess cardiac function/status     VTE prophylaxis: Heparin 5000 units SQ every 8 hours  Mechanical prophylaxis: Place SCDs    BP parameters: Infarct: No intervention, SBP <220        History of medication noncompliance   on importance of medication compliance   Check that prescriptions are affordable to patient/ adequate financial assistance in place prior to discharge     BMI 34.0-34.9,adult  Stroke Risk Factor    Recommend counseling on diet and exercise when appropriate     Type 2 diabetes mellitus  Stroke Risk Factor    A1C 6   IP goal for glucose 140-180   SSI     Essential hypertension  Stroke Risk Factor   SBP < 220 for acute infarct without intervention  1/2 dose of home valsartan started on 12/31 for pressures consistently above goal despite PRN pushes , other 1/2 (160mg today per day) started later that evening for persistent HTN   Will continue to monitor and add HCTZ (home med) if needed today if consistently elevated pressures     Tobacco abuse   on cessation when appropriate   Consider smoking cessation referral when discharged        12/31/2022 Patient c/o R calf pain today, US r/o DVT. 1/2 dose of home valsartan was started this morning for SBP > 220 despite PRN pushes. Goal < 220, will continue to monitor. TTE pending. EKG obtained for evaluation of Qtc given higher dose of seroquel at home. Therapy recommendations for SNF. On DAPT.   01/01/2023 Patient c/o R calf pain. US negative for DVT. Will apply lidocaine patch to assist with pain management along with already PRN tylenol. TTE pending. Pending SNF. Other 1/2 of home valsartan started yesterday for SBP > 200 still. Today will continue to monitor and add home HCTZ if needed for persistent hypertension.         STROKE DOCUMENTATION   Acute Stroke Times   Last Known Normal Date: 12/30/22  Last Known Normal Time: 1330  Symptom Onset Date: 12/30/22  Symptom Onset Time: 1330  Stroke Team Called Date: 12/30/22  Stroke Team Called Time: 2104  Stroke Team Arrival Date: 12/30/22  Stroke Team Arrival Time: 2106  CT Interpretation Time: 1538  Thrombolytic Therapy Recommended: No  CTA Interpretation Time:  (Pending, patient lost access on initial attempt)  Thrombectomy Recommended: No  MRI Acute Stroke Protocol Interpretation Time: 2130    NIH Scale:  1a. Level of Consciousness: 0-->Alert, keenly responsive  1b. LOC Questions: 0-->Answers both questions  correctly  1c. LOC Commands: 0-->Performs both tasks correctly  2. Best Gaze: 0-->Normal  3. Visual: 0-->No visual loss  4. Facial Palsy: 1-->Minor paralysis (flattened nasolabial fold, asymmetry on smiling)  5a. Motor Arm, Left: 0-->No drift, limb holds 90 (or 45) degrees for full 10 secs  5b. Motor Arm, Right: 1-->Drift, limb holds 90 (or 45) degrees, but drifts down before full 10 secs, does not hit bed or other support  6a. Motor Leg, Left: 0-->No drift, leg holds 30 degree position for full 5 secs  6b. Motor Leg, Right: 0-->No drift, leg holds 30 degree position for full 5 secs  7. Limb Ataxia: 0-->Absent  8. Sensory: 1-->Mild-to-moderate sensory loss, patient feels pinprick is less sharp or is dull on the affected side, or there is a loss of superficial pain with pinprick, but patient is aware of being touched  9. Best Language: 0-->No aphasia, normal  10. Dysarthria: 0-->Normal  11. Extinction and Inattention (formerly Neglect): 0-->No abnormality  Total (NIH Stroke Scale): 3       Modified [place] Score: 2  [place] Coma Scale:    ABCD2 Score:    [id number] Score:   HAS -BLED Score:   ICH Score:   Hunt & Ulloa Classification:      Hemorrhagic change of an Ischemic Stroke: Does this patient have an ischemic stroke with hemorrhagic changes? No     Neurologic Chief Complaint: HA and L A2 occlusion     Subjective:     Interval History: Patient is seen for follow-up neurological assessment and treatment recommendations:     Patient c/o R calf pain. US negative for DVT. Will apply lidocaine patch to assist with pain management along with already PRN tylenol. TTE pending. Pending SNF.     HPI, Past Medical, Family, and Social History remains the same as documented in the initial encounter.     Review of Systems   Constitutional:  Negative for fever.   HENT:  Negative for drooling.    Eyes:  Negative for visual disturbance.   Respiratory:  Negative for cough.    Gastrointestinal:  Negative for diarrhea and vomiting.    Genitourinary:  Negative for difficulty urinating.   Musculoskeletal:         R calf pain    Neurological:  Positive for facial asymmetry, weakness and numbness. Negative for speech difficulty.   Psychiatric/Behavioral:  Negative for agitation, behavioral problems and confusion.    Scheduled Meds:   aspirin  81 mg Oral Daily    atorvastatin  40 mg Oral Daily    clopidogreL  75 mg Oral Daily    FLUoxetine  20 mg Oral Daily    heparin (porcine)  5,000 Units Subcutaneous Q8H    levothyroxine  50 mcg Oral Before breakfast    LIDOcaine  1 patch Transdermal Once    QUEtiapine  150 mg Oral Nightly    valsartan  80 mg Oral BID     Continuous Infusions:   sodium chloride 0.9%       PRN Meds:acetaminophen, albuterol, labetaloL, melatonin, sodium chloride 0.9%, sodium chloride 0.9%    Objective:     Vital Signs (Most Recent):  Temp: 98.7 °F (37.1 °C) (01/01/23 1203)  Pulse: 91 (01/01/23 1203)  Resp: 17 (01/01/23 1203)  BP: (!) 179/105 (01/01/23 1203)  SpO2: (!) 93 % (01/01/23 1203)  BP Location: Right arm    Vital Signs Range (Last 24H):  Temp:  [97.6 °F (36.4 °C)-98.7 °F (37.1 °C)]   Pulse:  [73-91]   Resp:  [16-20]   BP: (140-247)/()   SpO2:  [91 %-97 %]   BP Location: Right arm    Physical Exam  Vitals and nursing note reviewed.   Constitutional:       Appearance: She is obese. She is not ill-appearing.   HENT:      Head: Normocephalic and atraumatic.      Nose: No rhinorrhea.   Eyes:      General: No scleral icterus.     Extraocular Movements: Extraocular movements intact.   Cardiovascular:      Rate and Rhythm: Normal rate.   Pulmonary:      Effort: Pulmonary effort is normal. No respiratory distress.   Musculoskeletal:         General: No tenderness.   Skin:     General: Skin is warm and dry.   Neurological:      Mental Status: She is alert and oriented to person, place, and time.      Sensory: Sensory deficit present.      Motor: Weakness (improving) present.      Comments: Very mild RUE drift   Facial  droop present   Alert and oriented x3      Psychiatric:         Behavior: Behavior is cooperative.       Neurological Exam:   LOC: alert  Attention Span: Good   Language: No aphasia  Articulation: No dysarthria  Orientation: Person, Place, Time   Visual Fields: Full  EOM (CN III, IV, VI): Full/intact  Facial Movement (CN VII): Lower facial weakness on the Right  Motor: Arm left  Normal 5/5  Leg left  Normal 5/5  Arm right  Paresis: 4/5  Leg right Paresis: 4/5  Sensation: Slightly decreased on R side with simultaneous testing     Laboratory:  CMP:   Recent Labs   Lab 01/01/23  0401   CALCIUM 9.4   ALBUMIN 3.4*   PROT 7.2      K 3.1*   CO2 24      BUN 10   CREATININE 0.7   ALKPHOS 80   ALT 11   AST 14   BILITOT 0.7       BMP:   Recent Labs   Lab 01/01/23  0401      K 3.1*      CO2 24   BUN 10   CREATININE 0.7   CALCIUM 9.4       CBC:   Recent Labs   Lab 01/01/23  0401   WBC 5.12   RBC 4.41   HGB 12.5   HCT 39.7      MCV 90   MCH 28.3   MCHC 31.5*       Lipid Panel:   Recent Labs   Lab 12/30/22  1603   CHOL 246*   LDLCALC 170.0*   HDL 59   TRIG 85       Coagulation:   Recent Labs   Lab 12/31/22  0523   INR 1.0   APTT 28.9       Platelet Aggregation Study: No results for input(s): PLTAGG, PLTAGINTERP, PLTAGREGLACO, ADPPLTAGGREG in the last 168 hours.  Hgb A1C:   Recent Labs   Lab 12/30/22  2304   HGBA1C 6.0*       TSH:   Recent Labs   Lab 12/30/22  1603   TSH 1.899         Diagnostic Results     Brain imaging:  MRI Brain WO Contrast 12/30/22  1. Small focus of acute/subacute lacunar infarction in the left corona radiata.  Recommend follow-up.  2. Involutional changes with chronic microvascular ischemic changes.  Remote left frontal cortical infarct.  3. Mild paranasal sinus disease.     CTH 12/30/22   Findings concerning for recent infarction involving the left frontal lobe and insular ribbon, as above. No intracranial hemorrhage. Sequela of mild senescent and chronic microvascular  ischemic change with stable left caudate remote lacunar type infarct.     Vessel Imaging:  CTA H/N 12/30/22   1. Left anterior cerebral artery mid to distal A2 occlusion.  If clinical concern for acute infarction, further evaluation may be obtained with MRI.  2. CTA head and neck with no additional large vessel occlusion or dissection.    Cardiac Imaging   TTE  pending       David Medina PA-C  Comprehensive Stroke Center  Department of Vascular Neurology   [facility] - Neurosurgery [facility])

## 2023-01-02 LAB
ALBUMIN SERPL BCP-MCNC: 3.5 G/DL (ref 3.5–5.2)
ALP SERPL-CCNC: 78 U/L (ref 55–135)
ALT SERPL W/O P-5'-P-CCNC: 16 U/L (ref 10–44)
ANION GAP SERPL CALC-SCNC: 9 MMOL/L (ref 8–16)
AST SERPL-CCNC: 18 U/L (ref 10–40)
BASOPHILS # BLD AUTO: 0.02 K/UL (ref 0–0.2)
BASOPHILS NFR BLD: 0.4 % (ref 0–1.9)
BILIRUB SERPL-MCNC: 0.5 MG/DL (ref 0.1–1)
BUN SERPL-MCNC: 12 MG/DL (ref 8–23)
CALCIUM SERPL-MCNC: 9.7 MG/DL (ref 8.7–10.5)
CHLORIDE SERPL-SCNC: 103 MMOL/L (ref 95–110)
CO2 SERPL-SCNC: 23 MMOL/L (ref 23–29)
CREAT SERPL-MCNC: 0.8 MG/DL (ref 0.5–1.4)
DIFFERENTIAL METHOD: ABNORMAL
EOSINOPHIL # BLD AUTO: 0.3 K/UL (ref 0–0.5)
EOSINOPHIL NFR BLD: 5.9 % (ref 0–8)
ERYTHROCYTE [DISTWIDTH] IN BLOOD BY AUTOMATED COUNT: 14.9 % (ref 11.5–14.5)
EST. GFR  (NO RACE VARIABLE): >60 ML/MIN/1.73 M^2
GLUCOSE SERPL-MCNC: 198 MG/DL (ref 70–110)
HCT VFR BLD AUTO: 40.5 % (ref 37–48.5)
HGB BLD-MCNC: 13 G/DL (ref 12–16)
IMM GRANULOCYTES # BLD AUTO: 0.01 K/UL (ref 0–0.04)
IMM GRANULOCYTES NFR BLD AUTO: 0.2 % (ref 0–0.5)
LYMPHOCYTES # BLD AUTO: 1.8 K/UL (ref 1–4.8)
LYMPHOCYTES NFR BLD: 37.4 % (ref 18–48)
MCH RBC QN AUTO: 28.8 PG (ref 27–31)
MCHC RBC AUTO-ENTMCNC: 32.1 G/DL (ref 32–36)
MCV RBC AUTO: 90 FL (ref 82–98)
MONOCYTES # BLD AUTO: 0.3 K/UL (ref 0.3–1)
MONOCYTES NFR BLD: 6.3 % (ref 4–15)
NEUTROPHILS # BLD AUTO: 2.4 K/UL (ref 1.8–7.7)
NEUTROPHILS NFR BLD: 49.8 % (ref 38–73)
NRBC BLD-RTO: 0 /100 WBC
PLATELET # BLD AUTO: 268 K/UL (ref 150–450)
PMV BLD AUTO: 11.2 FL (ref 9.2–12.9)
POTASSIUM SERPL-SCNC: 3.5 MMOL/L (ref 3.5–5.1)
PROT SERPL-MCNC: 7.4 G/DL (ref 6–8.4)
RBC # BLD AUTO: 4.52 M/UL (ref 4–5.4)
SODIUM SERPL-SCNC: 135 MMOL/L (ref 136–145)
WBC # BLD AUTO: 4.89 K/UL (ref 3.9–12.7)

## 2023-01-02 PROCEDURE — 25000003 PHARM REV CODE 250: Performed by: PHYSICIAN ASSISTANT

## 2023-01-02 PROCEDURE — 25000003 PHARM REV CODE 250: Performed by: NURSE PRACTITIONER

## 2023-01-02 PROCEDURE — 94761 N-INVAS EAR/PLS OXIMETRY MLT: CPT

## 2023-01-02 PROCEDURE — 99233 SBSQ HOSP IP/OBS HIGH 50: CPT | Mod: ,,, | Performed by: PSYCHIATRY & NEUROLOGY

## 2023-01-02 PROCEDURE — 80053 COMPREHEN METABOLIC PANEL: CPT | Performed by: PHYSICIAN ASSISTANT

## 2023-01-02 PROCEDURE — 99233 PR SUBSEQUENT HOSPITAL CARE,LEVL III: ICD-10-PCS | Mod: ,,, | Performed by: PSYCHIATRY & NEUROLOGY

## 2023-01-02 PROCEDURE — 11000001 HC ACUTE MED/SURG PRIVATE ROOM

## 2023-01-02 PROCEDURE — 63600175 PHARM REV CODE 636 W HCPCS: Performed by: PHYSICIAN ASSISTANT

## 2023-01-02 PROCEDURE — 36415 COLL VENOUS BLD VENIPUNCTURE: CPT | Performed by: PHYSICIAN ASSISTANT

## 2023-01-02 PROCEDURE — 85025 COMPLETE CBC W/AUTO DIFF WBC: CPT | Performed by: PHYSICIAN ASSISTANT

## 2023-01-02 RX ORDER — HYDROCHLOROTHIAZIDE 25 MG/1
25 TABLET ORAL NIGHTLY
Status: DISCONTINUED | OUTPATIENT
Start: 2023-01-02 | End: 2023-01-03

## 2023-01-02 RX ORDER — AMLODIPINE BESYLATE 10 MG/1
10 TABLET ORAL DAILY
Status: DISCONTINUED | OUTPATIENT
Start: 2023-01-02 | End: 2023-01-06 | Stop reason: HOSPADM

## 2023-01-02 RX ADMIN — HEPARIN SODIUM 5000 UNITS: 5000 INJECTION INTRAVENOUS; SUBCUTANEOUS at 09:01

## 2023-01-02 RX ADMIN — HYDROCHLOROTHIAZIDE 25 MG: 25 TABLET ORAL at 08:01

## 2023-01-02 RX ADMIN — LEVOTHYROXINE SODIUM 50 MCG: 50 TABLET ORAL at 05:01

## 2023-01-02 RX ADMIN — QUETIAPINE FUMARATE 150 MG: 100 TABLET ORAL at 08:01

## 2023-01-02 RX ADMIN — HEPARIN SODIUM 5000 UNITS: 5000 INJECTION INTRAVENOUS; SUBCUTANEOUS at 01:01

## 2023-01-02 RX ADMIN — FLUOXETINE 20 MG: 20 CAPSULE ORAL at 08:01

## 2023-01-02 RX ADMIN — ASPIRIN 81 MG: 81 TABLET, COATED ORAL at 08:01

## 2023-01-02 RX ADMIN — ATORVASTATIN CALCIUM 40 MG: 20 TABLET, FILM COATED ORAL at 08:01

## 2023-01-02 RX ADMIN — HEPARIN SODIUM 5000 UNITS: 5000 INJECTION INTRAVENOUS; SUBCUTANEOUS at 05:01

## 2023-01-02 RX ADMIN — CLOPIDOGREL BISULFATE 75 MG: 75 TABLET ORAL at 08:01

## 2023-01-02 RX ADMIN — AMLODIPINE BESYLATE 10 MG: 10 TABLET ORAL at 01:01

## 2023-01-02 RX ADMIN — VALSARTAN 160 MG: 40 TABLET, FILM COATED ORAL at 08:01

## 2023-01-02 RX ADMIN — Medication 6 MG: at 08:01

## 2023-01-02 NOTE — ASSESSMENT & PLAN NOTE
Stroke Risk Factor   SBP <180  Continue Valsartan, HCTZ dose increased to 25mg BID, and started amlodipine 10mg daily  PRN labetalol/hydralazine

## 2023-01-02 NOTE — ASSESSMENT & PLAN NOTE
73 yo F with PMH of HTN, DM, obesity, and noncompliance with medications admitted with acute L MCA stroke. Presented to OSH with abnormal behavior followed by chest pain, abdominal pain, and HA.  on arrival. Telestroke with Dr. Bower, NIHSS 5. CTH with hypodensity in L insular cortex. Not eligible for TNK. CTA H/N with L KEITH mid to distal A2 occlusion. Patient transferred to [facility] for possible intervention and higher level of care. On arrival to [facility] NIHSS 8 initially. New RLE weakness and dysarthria from previous NIHSS 5 at OSH. Of note patient was given dilaudid prior to transfer. MRI ischemic protocol with L MCA infarct and L A2 occlusion, felt by IR to be chronic therefore not IR candidate. Etiology at this time ESUS, echo pending to complete stroke workup.    NAEO, neuro exam stable. Continues to have mild R hemiparesis. BP meds titrated for better control. Dispo planning for SNF.       Antithrombotics for secondary stroke prevention: Antiplatelets: Aspirin: 81 mg daily  Clopidogrel: 75 mg daily (loaded with DAPT at OSH)     Statins for secondary stroke prevention and hyperlipidemia, if present:   Statins: Atorvastatin- 40 mg daily, LDL goal <70 long term     Aggressive risk factor modification: HTN, DM, HLD, Diet, Exercise, Obesity     Rehab efforts: SNF     Diagnostics ordered/pending: TTE to assess cardiac function/status     VTE prophylaxis: Heparin 5000 units SQ every 8 hours  Mechanical prophylaxis: Place SCDs    BP parameters: SBP <180

## 2023-01-02 NOTE — PLAN OF CARE
Problem: Adult Inpatient Plan of Care  Goal: Plan of Care Review  Outcome: Ongoing, Progressing  Flowsheets (Taken 1/2/2023 0302)  Plan of Care Reviewed With: patient     Problem: Adjustment to Illness (Stroke, Ischemic/Transient Ischemic Attack)  Goal: Optimal Coping  Outcome: Ongoing, Progressing  Intervention: Support Psychosocial Response to Stroke  Flowsheets (Taken 1/2/2023 0302)  Supportive Measures:   relaxation techniques promoted   verbalization of feelings encouraged     Problem: Fall Injury Risk  Goal: Absence of Fall and Fall-Related Injury  Outcome: Ongoing, Progressing  Intervention: Identify and Manage Contributors  Flowsheets (Taken 1/2/2023 0302)  Self-Care Promotion: safe use of adaptive equipment encouraged   No neuro changes.  Remains afebrile and free from falls.  Stroke education reviewed, material at the bedside.  Needs reinforcement.  SBP monitored.  Now on hydrochlorothiazide 12.5 mg nightly.  See VS in the flowsheet.  Adjustment to illness ongoing.  On 02 @ 2 L per NC.  Safety maintained.  Instructed to call for assistance.  Bed alarm on and audible.  Bed locked and call light within reach.

## 2023-01-02 NOTE — PROGRESS NOTES
Hardeep Dow - Neurosurgery ([facility])  Vascular Neurology  Comprehensive Stroke Center  Progress Note    Assessment/Plan:     * Cerebral infarction due to embolism of left middle cerebral artery  73 yo F with PMH of HTN, DM, obesity, and noncompliance with medications admitted with acute L MCA stroke. Presented to OSH with abnormal behavior followed by chest pain, abdominal pain, and HA.  on arrival. Telestroke with Dr. Bower, NIHSS 5. CTH with hypodensity in L insular cortex. Not eligible for TNK. CTA H/N with L KEITH mid to distal A2 occlusion. Patient transferred to [facility] for possible intervention and higher level of care. On arrival to [facility] NIHSS 8 initially. New RLE weakness and dysarthria from previous NIHSS 5 at OSH. Of note patient was given dilaudid prior to transfer. MRI ischemic protocol with L MCA infarct and L A2 occlusion, felt by IR to be chronic therefore not IR candidate. Etiology at this time ESUS, echo pending to complete stroke workup.    NAEO, neuro exam stable. Continues to have mild R hemiparesis. BP meds titrated for better control. Dispo planning for SNF.       Antithrombotics for secondary stroke prevention: Antiplatelets: Aspirin: 81 mg daily  Clopidogrel: 75 mg daily (loaded with DAPT at OSH)     Statins for secondary stroke prevention and hyperlipidemia, if present:   Statins: Atorvastatin- 40 mg daily, LDL goal <70 long term     Aggressive risk factor modification: HTN, DM, HLD, Diet, Exercise, Obesity     Rehab efforts: SNF     Diagnostics ordered/pending: TTE to assess cardiac function/status     VTE prophylaxis: Heparin 5000 units SQ every 8 hours  Mechanical prophylaxis: Place SCDs    BP parameters: SBP <180      History of medication noncompliance   on importance of medication compliance   Check that prescriptions are affordable to patient/ adequate financial assistance in place prior to discharge     BMI 34.0-34.9,adult  Stroke Risk Factor   Recommend  counseling on diet and exercise when appropriate     Type 2 diabetes mellitus  Stroke Risk Factor    A1C 6   IP goal for glucose 140-180   SSI     Essential hypertension  Stroke Risk Factor   SBP <180  Continue Valsartan, HCTZ dose increased to 25mg BID, and started amlodipine 10mg daily  PRN labetalol/hydralazine    Tobacco abuse   on cessation when appropriate   Consider smoking cessation referral when discharged          12/31/2022 Patient c/o R calf pain today, US r/o DVT. 1/2 dose of home valsartan was started this morning for SBP > 220 despite PRN pushes. Goal < 220, will continue to monitor. TTE pending. EKG obtained for evaluation of Qtc given higher dose of seroquel at home. Therapy recommendations for SNF. On DAPT.   01/01/2023 Patient c/o R calf pain. US negative for DVT. Will apply lidocaine patch to assist with pain management along with already PRN tylenol. TTE pending. Pending SNF. Other 1/2 of home valsartan started yesterday for SBP > 200 still. Today will continue to monitor and add home HCTZ if needed for persistent hypertension.   01/02/2023 NAEO, neuro exam stable. Continues to have mild R hemiparesis. BP meds titrated for better control, echo pending to complete stroke workup.      STROKE DOCUMENTATION   Acute Stroke Times   Last Known Normal Date: 12/30/22  Last Known Normal Time: 1330  Symptom Onset Date: 12/30/22  Symptom Onset Time: 1330  Stroke Team Called Date: 12/30/22  Stroke Team Called Time: 2104  Stroke Team Arrival Date: 12/30/22  Stroke Team Arrival Time: 2106  CT Interpretation Time: 1538  Thrombolytic Therapy Recommended: No  CTA Interpretation Time:  (Pending, patient lost access on initial attempt)  Thrombectomy Recommended: No  MRI Acute Stroke Protocol Interpretation Time: 2130    NIH Scale:  1a. Level of Consciousness: 0-->Alert, keenly responsive  1b. LOC Questions: 0-->Answers both questions correctly  1c. LOC Commands: 0-->Performs both tasks correctly  2. Best  Gaze: 0-->Normal  3. Visual: 0-->No visual loss  4. Facial Palsy: 1-->Minor paralysis (flattened nasolabial fold, asymmetry on smiling)  5a. Motor Arm, Left: 0-->No drift, limb holds 90 (or 45) degrees for full 10 secs  5b. Motor Arm, Right: 0-->No drift, limb holds 90 (or 45) degrees for full 10 secs  6a. Motor Leg, Left: 0-->No drift, leg holds 30 degree position for full 5 secs  6b. Motor Leg, Right: 1-->Drift, leg falls by the end of the 5-sec period but does not hit bed  7. Limb Ataxia: 0-->Absent  8. Sensory: 0-->Normal, no sensory loss  9. Best Language: 0-->No aphasia, normal  10. Dysarthria: 0-->Normal  11. Extinction and Inattention (formerly Neglect): 0-->No abnormality  Total (NIH Stroke Scale): 2       Modified Terry Score: 2  Samaria Coma Scale:    ABCD2 Score:    QCDQ3CQ6-FAC Score:   HAS -BLED Score:   ICH Score:   Hunt & Ulloa Classification:      Hemorrhagic change of an Ischemic Stroke: Does this patient have an ischemic stroke with hemorrhagic changes? No     Neurologic Chief Complaint: HA and L A2 occlusion     Subjective:     Interval History: Patient is seen for follow-up neurological assessment and treatment recommendations:   NAEO, neuro exam stable. Continues to have mild R hemiparesis. BP meds titrated for better control, echo pending to complete stroke workup.    HPI, Past Medical, Family, and Social History remains the same as documented in the initial encounter.     Review of Systems   Constitutional:  Negative for fever.   HENT:  Negative for drooling.    Eyes:  Negative for visual disturbance.   Respiratory:  Negative for cough.    Gastrointestinal:  Negative for diarrhea and vomiting.   Genitourinary:  Negative for difficulty urinating.   Neurological:  Positive for facial asymmetry, weakness and numbness. Negative for speech difficulty.   Psychiatric/Behavioral:  Negative for agitation, behavioral problems and confusion.    Scheduled Meds:   amLODIPine  10 mg Oral Daily    aspirin   81 mg Oral Daily    atorvastatin  40 mg Oral Daily    clopidogreL  75 mg Oral Daily    FLUoxetine  20 mg Oral Daily    heparin (porcine)  5,000 Units Subcutaneous Q8H    hydroCHLOROthiazide  25 mg Oral QHS    levothyroxine  50 mcg Oral Before breakfast    QUEtiapine  150 mg Oral Nightly    valsartan  160 mg Oral Daily     Continuous Infusions:   sodium chloride 0.9%       PRN Meds:acetaminophen, albuterol, labetaloL, melatonin, sodium chloride 0.9%, sodium chloride 0.9%    Objective:     Vital Signs (Most Recent):  Temp: 98.5 °F (36.9 °C) (01/02/23 1146)  Pulse: 85 (01/02/23 1146)  Resp: 16 (01/02/23 1146)  BP: 137/81 (01/02/23 1146)  SpO2: 99 % (01/02/23 1146)  BP Location: Left arm    Vital Signs Range (Last 24H):  Temp:  [97.8 °F (36.6 °C)-98.8 °F (37.1 °C)]   Pulse:  [71-95]   Resp:  [16-18]   BP: (137-228)/()   SpO2:  [96 %-100 %]   BP Location: Left arm    Physical Exam  Vitals and nursing note reviewed.   Constitutional:       Appearance: She is obese. She is not ill-appearing.   HENT:      Head: Normocephalic and atraumatic.      Nose: No rhinorrhea.   Eyes:      General: No scleral icterus.     Extraocular Movements: Extraocular movements intact.   Cardiovascular:      Rate and Rhythm: Normal rate.   Pulmonary:      Effort: Pulmonary effort is normal. No respiratory distress.   Musculoskeletal:         General: No tenderness.   Skin:     General: Skin is warm and dry.   Neurological:      Mental Status: She is alert and oriented to person, place, and time.      Cranial Nerves: Facial asymmetry present.      Sensory: No sensory deficit.      Motor: Weakness (improving) present.   Psychiatric:         Behavior: Behavior is cooperative.       Neurological Exam:   LOC: alert  Attention Span: Good   Language: No aphasia  Articulation: No dysarthria  Orientation: Person, Place, Time   Visual Fields: Full  EOM (CN III, IV, VI): Full/intact  Facial Movement (CN VII): Lower facial weakness on the  Right  Motor: Arm left  Normal 5/5  Leg left  Normal 5/5  Arm right  Paresis: 4/5  Leg right Paresis: 4/5  Sensation: intact to light touch    Laboratory:  CMP:   Recent Labs   Lab 01/02/23  0959   CALCIUM 9.7   ALBUMIN 3.5   PROT 7.4   *   K 3.5   CO2 23      BUN 12   CREATININE 0.8   ALKPHOS 78   ALT 16   AST 18   BILITOT 0.5       BMP:   Recent Labs   Lab 01/02/23  0959   *   K 3.5      CO2 23   BUN 12   CREATININE 0.8   CALCIUM 9.7       CBC:   Recent Labs   Lab 01/02/23  0959   WBC 4.89   RBC 4.52   HGB 13.0   HCT 40.5      MCV 90   MCH 28.8   MCHC 32.1       Lipid Panel:   Recent Labs   Lab 12/30/22  1603   CHOL 246*   LDLCALC 170.0*   HDL 59   TRIG 85       Coagulation:   Recent Labs   Lab 12/31/22  0523   INR 1.0   APTT 28.9       Platelet Aggregation Study: No results for input(s): PLTAGG, PLTAGINTERP, PLTAGREGLACO, ADPPLTAGGREG in the last 168 hours.  Hgb A1C:   Recent Labs   Lab 12/30/22  2304   HGBA1C 6.0*       TSH:   Recent Labs   Lab 12/30/22  1603   TSH 1.899         Diagnostic Results     Brain imaging:  MRI Brain WO Contrast 12/30/22  1. Small focus of acute/subacute lacunar infarction in the left corona radiata.  Recommend follow-up.  2. Involutional changes with chronic microvascular ischemic changes.  Remote left frontal cortical infarct.  3. Mild paranasal sinus disease.     CTH 12/30/22   Findings concerning for recent infarction involving the left frontal lobe and insular ribbon, as above. No intracranial hemorrhage. Sequela of mild senescent and chronic microvascular ischemic change with stable left caudate remote lacunar type infarct.       Vessel Imaging:  CTA H/N 12/30/22   1. Left anterior cerebral artery mid to distal A2 occlusion.  If clinical concern for acute infarction, further evaluation may be obtained with MRI.  2. CTA head and neck with no additional large vessel occlusion or dissection.      Cardiac Imaging   TTE  pending       Mary Hicks  PA  Comprehensive Stroke Center  Department of Vascular Neurology   Hardeep Dow - Neurosurgery ([facility])

## 2023-01-02 NOTE — CARE UPDATE
RAPID RESPONSE NURSE ROUND       Rounding completed with charge RNPati for hypertension reports SBP goal <220. No additional concerns verbalized at this time. Instructed to call [phone] for further concerns or assistance.

## 2023-01-02 NOTE — SUBJECTIVE & OBJECTIVE
Neurologic Chief Complaint: CASTELLANOS and L A2 occlusion     Subjective:     Interval History: Patient is seen for follow-up neurological assessment and treatment recommendations:   NAEO, neuro exam stable. Continues to have mild R hemiparesis. BP meds titrated for better control, echo pending to complete stroke workup.    HPI, Past Medical, Family, and Social History remains the same as documented in the initial encounter.     Review of Systems   Constitutional:  Negative for fever.   HENT:  Negative for drooling.    Eyes:  Negative for visual disturbance.   Respiratory:  Negative for cough.    Gastrointestinal:  Negative for diarrhea and vomiting.   Genitourinary:  Negative for difficulty urinating.   Neurological:  Positive for facial asymmetry, weakness and numbness. Negative for speech difficulty.   Psychiatric/Behavioral:  Negative for agitation, behavioral problems and confusion.    Scheduled Meds:   amLODIPine  10 mg Oral Daily    aspirin  81 mg Oral Daily    atorvastatin  40 mg Oral Daily    clopidogreL  75 mg Oral Daily    FLUoxetine  20 mg Oral Daily    heparin (porcine)  5,000 Units Subcutaneous Q8H    hydroCHLOROthiazide  25 mg Oral QHS    levothyroxine  50 mcg Oral Before breakfast    QUEtiapine  150 mg Oral Nightly    valsartan  160 mg Oral Daily     Continuous Infusions:   sodium chloride 0.9%       PRN Meds:acetaminophen, albuterol, labetaloL, melatonin, sodium chloride 0.9%, sodium chloride 0.9%    Objective:     Vital Signs (Most Recent):  Temp: 98.5 °F (36.9 °C) (01/02/23 1146)  Pulse: 85 (01/02/23 1146)  Resp: 16 (01/02/23 1146)  BP: 137/81 (01/02/23 1146)  SpO2: 99 % (01/02/23 1146)  BP Location: Left arm    Vital Signs Range (Last 24H):  Temp:  [97.8 °F (36.6 °C)-98.8 °F (37.1 °C)]   Pulse:  [71-95]   Resp:  [16-18]   BP: (137-228)/()   SpO2:  [96 %-100 %]   BP Location: Left arm    Physical Exam  Vitals and nursing note reviewed.   Constitutional:       Appearance: She is obese. She is not  ill-appearing.   HENT:      Head: Normocephalic and atraumatic.      Nose: No rhinorrhea.   Eyes:      General: No scleral icterus.     Extraocular Movements: Extraocular movements intact.   Cardiovascular:      Rate and Rhythm: Normal rate.   Pulmonary:      Effort: Pulmonary effort is normal. No respiratory distress.   Musculoskeletal:         General: No tenderness.   Skin:     General: Skin is warm and dry.   Neurological:      Mental Status: She is alert and oriented to person, place, and time.      Cranial Nerves: Facial asymmetry present.      Sensory: No sensory deficit.      Motor: Weakness (improving) present.   Psychiatric:         Behavior: Behavior is cooperative.       Neurological Exam:   LOC: alert  Attention Span: Good   Language: No aphasia  Articulation: No dysarthria  Orientation: Person, Place, Time   Visual Fields: Full  EOM (CN III, IV, VI): Full/intact  Facial Movement (CN VII): Lower facial weakness on the Right  Motor: Arm left  Normal 5/5  Leg left  Normal 5/5  Arm right  Paresis: 4/5  Leg right Paresis: 4/5  Sensation: intact to light touch    Laboratory:  CMP:   Recent Labs   Lab 01/02/23  0959   CALCIUM 9.7   ALBUMIN 3.5   PROT 7.4   *   K 3.5   CO2 23      BUN 12   CREATININE 0.8   ALKPHOS 78   ALT 16   AST 18   BILITOT 0.5       BMP:   Recent Labs   Lab 01/02/23  0959   *   K 3.5      CO2 23   BUN 12   CREATININE 0.8   CALCIUM 9.7       CBC:   Recent Labs   Lab 01/02/23  0959   WBC 4.89   RBC 4.52   HGB 13.0   HCT 40.5      MCV 90   MCH 28.8   MCHC 32.1       Lipid Panel:   Recent Labs   Lab 12/30/22  1603   CHOL 246*   LDLCALC 170.0*   HDL 59   TRIG 85       Coagulation:   Recent Labs   Lab 12/31/22  0523   INR 1.0   APTT 28.9       Platelet Aggregation Study: No results for input(s): PLTAGG, PLTAGINTERP, PLTAGREGLACO, ADPPLTAGGREG in the last 168 hours.  Hgb A1C:   Recent Labs   Lab 12/30/22  2304   HGBA1C 6.0*       TSH:   Recent Labs   Lab  12/30/22  1603   TSH 1.899         Diagnostic Results     Brain imaging:  MRI Brain WO Contrast 12/30/22  1. Small focus of acute/subacute lacunar infarction in the left corona radiata.  Recommend follow-up.  2. Involutional changes with chronic microvascular ischemic changes.  Remote left frontal cortical infarct.  3. Mild paranasal sinus disease.     CTH 12/30/22   Findings concerning for recent infarction involving the left frontal lobe and insular ribbon, as above. No intracranial hemorrhage. Sequela of mild senescent and chronic microvascular ischemic change with stable left caudate remote lacunar type infarct.       Vessel Imaging:  CTA H/N 12/30/22   1. Left anterior cerebral artery mid to distal A2 occlusion.  If clinical concern for acute infarction, further evaluation may be obtained with MRI.  2. CTA head and neck with no additional large vessel occlusion or dissection.      Cardiac Imaging   TTE  pending

## 2023-01-03 LAB
ALBUMIN SERPL BCP-MCNC: 3.5 G/DL (ref 3.5–5.2)
ALP SERPL-CCNC: 76 U/L (ref 55–135)
ALT SERPL W/O P-5'-P-CCNC: 22 U/L (ref 10–44)
ANION GAP SERPL CALC-SCNC: 8 MMOL/L (ref 8–16)
ASCENDING AORTA: 2.9 CM
AST SERPL-CCNC: 24 U/L (ref 10–40)
AV INDEX (PROSTH): 0.81
AV MEAN GRADIENT: 9 MMHG
AV PEAK GRADIENT: 16 MMHG
AV VALVE AREA: 2.63 CM2
AV VELOCITY RATIO: 0.8
BASOPHILS # BLD AUTO: 0.02 K/UL (ref 0–0.2)
BASOPHILS NFR BLD: 0.4 % (ref 0–1.9)
BILIRUB SERPL-MCNC: 0.5 MG/DL (ref 0.1–1)
BSA FOR ECHO PROCEDURE: 2.31 M2
BUN SERPL-MCNC: 12 MG/DL (ref 8–23)
CALCIUM SERPL-MCNC: 10.2 MG/DL (ref 8.7–10.5)
CHLORIDE SERPL-SCNC: 102 MMOL/L (ref 95–110)
CO2 SERPL-SCNC: 26 MMOL/L (ref 23–29)
CREAT SERPL-MCNC: 0.8 MG/DL (ref 0.5–1.4)
CV ECHO LV RWT: 1.14 CM
DIFFERENTIAL METHOD: ABNORMAL
DOP CALC AO PEAK VEL: 2 M/S
DOP CALC AO VTI: 33.18 CM
DOP CALC LVOT AREA: 3.2 CM2
DOP CALC LVOT DIAMETER: 2.03 CM
DOP CALC LVOT PEAK VEL: 1.59 M/S
DOP CALC LVOT STROKE VOLUME: 87.31 CM3
DOP CALCLVOT PEAK VEL VTI: 26.99 CM
E WAVE DECELERATION TIME: 226.13 MSEC
E/A RATIO: 0.58
E/E' RATIO: 8.47 M/S
ECHO LV POSTERIOR WALL: 1.74 CM (ref 0.6–1.1)
EJECTION FRACTION: 68 %
EOSINOPHIL # BLD AUTO: 0.4 K/UL (ref 0–0.5)
EOSINOPHIL NFR BLD: 7.7 % (ref 0–8)
ERYTHROCYTE [DISTWIDTH] IN BLOOD BY AUTOMATED COUNT: 14.7 % (ref 11.5–14.5)
EST. GFR  (NO RACE VARIABLE): >60 ML/MIN/1.73 M^2
FRACTIONAL SHORTENING: 34 % (ref 28–44)
GLUCOSE SERPL-MCNC: 127 MG/DL (ref 70–110)
HCT VFR BLD AUTO: 41.8 % (ref 37–48.5)
HGB BLD-MCNC: 13.4 G/DL (ref 12–16)
IMM GRANULOCYTES # BLD AUTO: 0.01 K/UL (ref 0–0.04)
IMM GRANULOCYTES NFR BLD AUTO: 0.2 % (ref 0–0.5)
INTERVENTRICULAR SEPTUM: 1.53 CM (ref 0.6–1.1)
IVRT: 91.34 MSEC
LA MAJOR: 5.32 CM
LA MINOR: 5.27 CM
LA WIDTH: 4.24 CM
LEFT ATRIUM SIZE: 4.17 CM
LEFT ATRIUM VOLUME INDEX MOD: 24.2 ML/M2
LEFT ATRIUM VOLUME INDEX: 35.4 ML/M2
LEFT ATRIUM VOLUME MOD: 54.34 CM3
LEFT ATRIUM VOLUME: 79.58 CM3
LEFT INTERNAL DIMENSION IN SYSTOLE: 2.02 CM (ref 2.1–4)
LEFT VENTRICLE DIASTOLIC VOLUME INDEX: 16.16 ML/M2
LEFT VENTRICLE DIASTOLIC VOLUME: 36.35 ML
LEFT VENTRICLE MASS INDEX: 83 G/M2
LEFT VENTRICLE SYSTOLIC VOLUME INDEX: 5.8 ML/M2
LEFT VENTRICLE SYSTOLIC VOLUME: 13.06 ML
LEFT VENTRICULAR INTERNAL DIMENSION IN DIASTOLE: 3.05 CM (ref 3.5–6)
LEFT VENTRICULAR MASS: 187.02 G
LV LATERAL E/E' RATIO: 7.2 M/S
LV SEPTAL E/E' RATIO: 10.29 M/S
LYMPHOCYTES # BLD AUTO: 2.2 K/UL (ref 1–4.8)
LYMPHOCYTES NFR BLD: 44.4 % (ref 18–48)
MCH RBC QN AUTO: 29.4 PG (ref 27–31)
MCHC RBC AUTO-ENTMCNC: 32.1 G/DL (ref 32–36)
MCV RBC AUTO: 92 FL (ref 82–98)
MONOCYTES # BLD AUTO: 0.4 K/UL (ref 0.3–1)
MONOCYTES NFR BLD: 8.7 % (ref 4–15)
MV A" WAVE DURATION": 7.42 MSEC
MV PEAK A VEL: 1.24 M/S
MV PEAK E VEL: 0.72 M/S
MV STENOSIS PRESSURE HALF TIME: 65.58 MS
MV VALVE AREA P 1/2 METHOD: 3.35 CM2
NEUTROPHILS # BLD AUTO: 1.9 K/UL (ref 1.8–7.7)
NEUTROPHILS NFR BLD: 38.6 % (ref 38–73)
NRBC BLD-RTO: 0 /100 WBC
PISA TR MAX VEL: 2.18 M/S
PLATELET # BLD AUTO: 239 K/UL (ref 150–450)
PMV BLD AUTO: 12.1 FL (ref 9.2–12.9)
POTASSIUM SERPL-SCNC: 3.8 MMOL/L (ref 3.5–5.1)
PROT SERPL-MCNC: 7.5 G/DL (ref 6–8.4)
PULM VEIN S/D RATIO: 1.62
PV PEAK D VEL: 0.26 M/S
PV PEAK S VEL: 0.42 M/S
RA MAJOR: 4.69 CM
RA PRESSURE: 3 MMHG
RA WIDTH: 3.12 CM
RBC # BLD AUTO: 4.56 M/UL (ref 4–5.4)
RIGHT VENTRICULAR END-DIASTOLIC DIMENSION: 2.92 CM
RV TISSUE DOPPLER FREE WALL SYSTOLIC VELOCITY 1 (APICAL 4 CHAMBER VIEW): 16.8 CM/S
SINUS: 2.53 CM
SODIUM SERPL-SCNC: 136 MMOL/L (ref 136–145)
STJ: 2.03 CM
TDI LATERAL: 0.1 M/S
TDI SEPTAL: 0.07 M/S
TDI: 0.09 M/S
TR MAX PG: 19 MMHG
TRICUSPID ANNULAR PLANE SYSTOLIC EXCURSION: 2.12 CM
TV REST PULMONARY ARTERY PRESSURE: 22 MMHG
WBC # BLD AUTO: 4.93 K/UL (ref 3.9–12.7)

## 2023-01-03 PROCEDURE — 27000221 HC OXYGEN, UP TO 24 HOURS

## 2023-01-03 PROCEDURE — 63600175 PHARM REV CODE 636 W HCPCS: Performed by: PHYSICIAN ASSISTANT

## 2023-01-03 PROCEDURE — 99233 SBSQ HOSP IP/OBS HIGH 50: CPT | Mod: ,,, | Performed by: PSYCHIATRY & NEUROLOGY

## 2023-01-03 PROCEDURE — 25500020 PHARM REV CODE 255: Performed by: PSYCHIATRY & NEUROLOGY

## 2023-01-03 PROCEDURE — 97535 SELF CARE MNGMENT TRAINING: CPT

## 2023-01-03 PROCEDURE — 36415 COLL VENOUS BLD VENIPUNCTURE: CPT | Performed by: PHYSICIAN ASSISTANT

## 2023-01-03 PROCEDURE — 94761 N-INVAS EAR/PLS OXIMETRY MLT: CPT

## 2023-01-03 PROCEDURE — 99233 PR SUBSEQUENT HOSPITAL CARE,LEVL III: ICD-10-PCS | Mod: ,,, | Performed by: PSYCHIATRY & NEUROLOGY

## 2023-01-03 PROCEDURE — 97530 THERAPEUTIC ACTIVITIES: CPT

## 2023-01-03 PROCEDURE — 80053 COMPREHEN METABOLIC PANEL: CPT | Performed by: PHYSICIAN ASSISTANT

## 2023-01-03 PROCEDURE — 25000003 PHARM REV CODE 250: Performed by: PHYSICIAN ASSISTANT

## 2023-01-03 PROCEDURE — 97530 THERAPEUTIC ACTIVITIES: CPT | Mod: CQ

## 2023-01-03 PROCEDURE — 85025 COMPLETE CBC W/AUTO DIFF WBC: CPT | Performed by: PHYSICIAN ASSISTANT

## 2023-01-03 PROCEDURE — 92523 SPEECH SOUND LANG COMPREHEN: CPT

## 2023-01-03 PROCEDURE — 11000001 HC ACUTE MED/SURG PRIVATE ROOM

## 2023-01-03 PROCEDURE — 25000003 PHARM REV CODE 250: Performed by: NURSE PRACTITIONER

## 2023-01-03 PROCEDURE — 97116 GAIT TRAINING THERAPY: CPT | Mod: CQ

## 2023-01-03 RX ORDER — HYDROCHLOROTHIAZIDE 25 MG/1
25 TABLET ORAL DAILY
Status: DISCONTINUED | OUTPATIENT
Start: 2023-01-03 | End: 2023-01-06 | Stop reason: HOSPADM

## 2023-01-03 RX ADMIN — VALSARTAN 160 MG: 40 TABLET, FILM COATED ORAL at 09:01

## 2023-01-03 RX ADMIN — HEPARIN SODIUM 5000 UNITS: 5000 INJECTION INTRAVENOUS; SUBCUTANEOUS at 01:01

## 2023-01-03 RX ADMIN — HEPARIN SODIUM 5000 UNITS: 5000 INJECTION INTRAVENOUS; SUBCUTANEOUS at 05:01

## 2023-01-03 RX ADMIN — FLUOXETINE 20 MG: 20 CAPSULE ORAL at 09:01

## 2023-01-03 RX ADMIN — ASPIRIN 81 MG: 81 TABLET, COATED ORAL at 09:01

## 2023-01-03 RX ADMIN — AMLODIPINE BESYLATE 10 MG: 10 TABLET ORAL at 09:01

## 2023-01-03 RX ADMIN — LEVOTHYROXINE SODIUM 50 MCG: 50 TABLET ORAL at 05:01

## 2023-01-03 RX ADMIN — HUMAN ALBUMIN MICROSPHERES AND PERFLUTREN 0.66 MG: 10; .22 INJECTION, SOLUTION INTRAVENOUS at 04:01

## 2023-01-03 RX ADMIN — HYDROCHLOROTHIAZIDE 25 MG: 25 TABLET ORAL at 09:01

## 2023-01-03 RX ADMIN — CLOPIDOGREL BISULFATE 75 MG: 75 TABLET ORAL at 09:01

## 2023-01-03 RX ADMIN — HEPARIN SODIUM 5000 UNITS: 5000 INJECTION INTRAVENOUS; SUBCUTANEOUS at 09:01

## 2023-01-03 RX ADMIN — QUETIAPINE FUMARATE 150 MG: 100 TABLET ORAL at 09:01

## 2023-01-03 RX ADMIN — ATORVASTATIN CALCIUM 40 MG: 20 TABLET, FILM COATED ORAL at 09:01

## 2023-01-03 NOTE — PLAN OF CARE
Problem: Adult Inpatient Plan of Care  Goal: Plan of Care Review  Outcome: Ongoing, Progressing     Problem: Adjustment to Illness (Stroke, Ischemic/Transient Ischemic Attack)  Goal: Optimal Coping  Outcome: Ongoing, Progressing  Intervention: Support Psychosocial Response to Stroke  Flowsheets (Taken 1/2/2023 1803)  Supportive Measures: active listening utilized  Family/Support System Care:   self-care encouraged   support provided     Problem: Cerebral Tissue Perfusion (Stroke, Ischemic/Transient Ischemic Attack)  Goal: Optimal Cerebral Tissue Perfusion  Outcome: Ongoing, Progressing  Intervention: Protect and Optimize Cerebral Perfusion  Flowsheets (Taken 1/2/2023 1803)  Sensory Stimulation Regulation: television on  Cerebral Perfusion Promotion: blood pressure monitored

## 2023-01-03 NOTE — PT/OT/SLP PROGRESS
Physical Therapy Treatment    Patient Name:  Nicolás Khan   MRN:  [id number]    Recommendations:     Discharge Recommendations: nursing facility, skilled  Discharge Equipment Recommendations:  (TBD as pt progresses)  Barriers to discharge: Inaccessible home    Assessment:     Nicolás Khan is a 72 y.o. female admitted with a medical diagnosis of Cerebral infarction due to embolism of left middle cerebral artery.  She presents with the following impairments/functional limitations: weakness, impaired self care skills, impaired functional mobility, gait instability, impaired balance, pain, decreased safety awareness. Pt participated, and tolerated treatment well. Pt will continue to benefit from skilled PT services to improve all deficits noted above. Resume PT POC as indicated.     Rehab Prognosis: Good; patient would benefit from acute skilled PT services to address these deficits and reach maximum level of function.    Recent Surgery: * No surgery found *      Plan:     During this hospitalization, patient to be seen 4 x/week to address the identified rehab impairments via gait training, therapeutic activities, therapeutic exercises, neuromuscular re-education and progress toward the following goals:    Plan of Care Expires:  01/30/23    Subjective     Chief Complaint: none stated  Patient/Family Comments/goals: none stated  Pain/Comfort:  Pain Rating 1: 0/10  Pain Rating Post-Intervention 1: 0/10      Objective:     Communicated with nursing prior to session.  Patient found up in chair with  (all lines intact) upon PT entry to room.     General Precautions: Standard, fall  Orthopedic Precautions: N/A  Braces: N/A  Respiratory Status: Nasal cannula, flow 2 L/min     Functional Mobility:  Transfers:  Sit to Stand: to/from bedside chair with CGA-Min A with rolling walker. Cueing for proper hand placement   Gait: Pt ambulated ~16ft with RW and Min A. Pt declined further distance on this date.   Balance: Pt stood with  RW and CGA for ~35 seconds prior to gait trial.       AM-PAC 6 CLICK MOBILITY   Total Score: 15       Treatment & Education:  -BLE therex x10 reps: LAQ,HF,Hip abd/add  -Questions answered within PTA scope of practice.     Patient left up in chair with all lines intact, call button in reach, chair alarm on, and nursing notified..    GOALS:   Multidisciplinary Problems       Physical Therapy Goals          Problem: Physical Therapy    Goal Priority Disciplines Outcome Goal Variances Interventions   Physical Therapy Goal     PT, PT/OT Ongoing, Progressing     Description: PT goals until 12/31/23    1. Pt supine to sit with mod independent-not met  2. Pt sit to supine with mod independent-not met  3. Pt sit to stand with RW with supervision-not met  4. Pt to perform gait 125ft with RW with supervision.-not met  5. Pt to up/down 2 steps with no rail with CGA.-not met  6. Pt to perform B LE exs in sitting or supine x 10 reps to strengthen B LE to improve functional mobility.-not met                        Time Tracking:     PT Received On: 01/03/23  PT Start Time: 1231     PT Stop Time: 1254  PT Total Time (min): 23 min     Billable Minutes: Gait Training 8 and Therapeutic Activity 15    Treatment Type: Treatment  PT/PTA: PTA     PTA Visit Number: 1 01/03/2023

## 2023-01-03 NOTE — PT/OT/SLP EVAL
Speech Language Pathology Evaluation  Cognitive Communication    Patient Name:  Nicolás Khan   MRN:  [id number]  Admitting Diagnosis: Cerebral infarction due to embolism of left middle cerebral artery    Recommendations:     Recommendations:                General Recommendations:  Cognitive-linguistic therapy  Diet recommendations:  Regular, Thin   Aspiration Precautions: 1 bite/sip at a time   General Precautions: Standard, fall  Communication strategies:  none    History:     Past Medical History:   Diagnosis Date    COPD (chronic obstructive pulmonary disease)     Diabetes mellitus     GERD (gastroesophageal reflux disease)     Hyperlipemia, mixed     Hypertension     Major depression     Obesity     Osteoarthritis     Thyroid disease     Tobacco abuse     Urinary incontinence, functional     Vitamin D deficiency        Past Surgical History:   Procedure Laterality Date    HYSTERECTOMY         Social History: Patient lives with sister and enjoys playing [employer]     Prior diet: Regular/thin.      Subjective     Awake/alert    Pain/Comfort:  Pain Rating 1: 0/10  Pain Rating Post-Intervention 1: 0/10    Respiratory Status: Room air    Objective:   Cognitive Status:    Orientation Oriented x4  Problem Solving Categories 100%, Sequencing 4.4 word set, and Compare/contrast 2/3 accy. Pt required increased time to respond throughout and notes delayed processing since admit       Receptive Language:   Comprehension:   Questions Complex yes/no 100%  Commands  complex/abstract commands 100%    Pragmatics:    WFL    Expressive Language:  Verbal:    Verbal language skills were wfl with no evidence of aphasia.  Pt. Expressed their thoughts coherently in conversation with no evidence of confusion or word finding deficits  Nonverbal:   WFL      Motor Speech:  WFL    Voice:   WFL    Visual-Spatial:  WFL-pt able to play solitaire on phone without difficulty.     Treatment: She tolerated pill whole x1 with thin liquids without  overt s/s of airway compromise.    Assessment:   Nicolás Khan is a 72 y.o. female with an SLP diagnosis of Cognitive-Linguistic Impairment.      Goals:   Multidisciplinary Problems       SLP Goals          Problem: SLP    Goal Priority Disciplines Outcome   SLP Goal     SLP Ongoing, Progressing   Description: Speech Language Pathology Goals  Goals expected to be met by 1/7    1. Pt will participate in ongoing swallow assessment  2. Pt will complete higher level cognitive tasks with 80% accy                                Plan:   Patient to be seen:  3 x/week   Plan of Care reviewed with:  patient   SLP Follow-Up:  Yes       Discharge recommendations:  Discharge Facility/Level of Care Needs: nursing facility, skilled       Time Tracking:   SLP Treatment Date:   01/03/23  Speech Start Time:  0937  Speech Stop Time:  0944     Speech Total Time (min):  7 min    Billable Minutes: Eval 7     01/03/2023

## 2023-01-03 NOTE — PLAN OF CARE
[facility] completed LOCET via phone. [facility] faxed PASSR to obtain the 142 for NH admission.    ANDREA Corbin  [phone]

## 2023-01-03 NOTE — ASSESSMENT & PLAN NOTE
73 yo F with PMH of HTN, DM, obesity, and noncompliance with medications admitted with acute L MCA stroke. Presented to OSH with abnormal behavior followed by chest pain, abdominal pain, and HA.  on arrival. Telestroke with Dr. Bower, NIHSS 5. CTH with hypodensity in L insular cortex. Not eligible for TNK. CTA H/N with L KEITH mid to distal A2 occlusion. Patient transferred to [facility] for possible intervention and higher level of care. On arrival to [facility] NIHSS 8 initially. New RLE weakness and dysarthria from previous NIHSS 5 at OSH. Of note patient was given dilaudid prior to transfer. MRI ischemic protocol with L MCA infarct and L A2 occlusion, felt by IR to be chronic therefore not IR candidate. Etiology at this time ESUS, will need 30d cardiac event monitor at discharge.    NAEO, neuro exam stable. BP improved following increased HCTZ and resuming amlodipine. Echo complete and results pending. Therapy re-eval for updated dispo recs today.       Antithrombotics for secondary stroke prevention: Antiplatelets: Aspirin: 81 mg daily  Clopidogrel: 75 mg daily (loaded with DAPT at OSH)     Statins for secondary stroke prevention and hyperlipidemia, if present:   Statins: Atorvastatin- 40 mg daily, LDL goal <70 long term     Aggressive risk factor modification: HTN, DM, HLD, Diet, Exercise, Obesity     Rehab efforts: SNF     Diagnostics ordered/pending: TTE to assess cardiac function/status     VTE prophylaxis: Heparin 5000 units SQ every 8 hours  Mechanical prophylaxis: Place SCDs    BP parameters: SBP <180     Please refer to attached ultrasound report for doctor's evaluation of the clinical information obtained by vital signs, ultrasound, and/or non-stress test along with management recommendation.

## 2023-01-03 NOTE — PT/OT/SLP PROGRESS
"Occupational Therapy Treatment    Patient Name:  Nicolás Khan   MRN:  [id number]  Admit Date: 12/30/2022  Admitting Diagnosis:  Cerebral infarction due to embolism of left middle cerebral artery   Length of Stay: 4 days  Recent Surgery: * No surgery found *      Recommendations:     Discharge Recommendations: nursing facility, skilled  Discharge Equipment Recommendations:  other (see comments) (TBD)  Barriers to discharge:  None    Plan:     Patient to be seen 3 x/week to address the above listed problems via self-care/home management, therapeutic activities, therapeutic exercises, neuromuscular re-education  Plan of Care Expires: 01/31/23  Plan of Care Reviewed with: patient    Assessment:   Nicolás Khan is a 72 y.o. female with a medical diagnosis of Cerebral infarction due to embolism of left middle cerebral artery.  She presents with the following performance deficits affecting function: weakness, impaired endurance, impaired self care skills, impaired functional mobility, gait instability, impaired balance, pain, decreased safety awareness, decreased lower extremity function, decreased upper extremity function, decreased coordination, impaired coordination, decreased ROM, impaired fine motor, impaired cardiopulmonary response to activity.      Pt tolerated session fairly this date and was willing to participate. Demonstrating continued increased pain, impaired endurance, increased weakness, decreased activity tolerance, impaired balance, decreased safety awareness and impaired cardiopulmonary response to activity, requiring increased time and assistance to complete functional tasks. Patient completed sit>stand transfers and functional mobility of household distance with Min A using RW. Patient required increased time due to increased fatigue, short steps and frequent unprompted standing rest breaks. Patient expressed feeling "heavy" unable to describe further, seated rest break taken. Patient with increased " "reports of dizziness throughout mobility trials. Patient is progressing towards established goals, and continues to benefit from acute skilled OT services to increase functional performance and improve quality of life. OT to continue to recommend SNF at discharge to improve pt functional independence and increase patient safety before returning home.      Rehab Prognosis: Fair; patient would benefit from acute skilled OT services to address these deficits and reach maximum level of function.        Subjective   Communicated with: Nurse prior to session.  Patient found up in chair with chair check, PureWick, oxygen, peripheral IV, telemetry upon OT entry to room. Pt agreeable to participate at this time.    Patient: " I feel heavy "    Pain/Comfort:  Pain Rating 1: 8/10  Location - Side 1: Left  Location - Orientation 1: generalized  Location 1: leg  Pain Addressed 1: Reposition, Distraction, Cessation of Activity  Pain Rating Post-Intervention 1: 8/10    Objective:   Patient found with: chair check, PureWick, oxygen, peripheral IV, telemetry   General Precautions: Standard, Cardiac fall   Orthopedic Precautions:N/A   Braces: N/A   Oxygen Device: Nasal Cannula 2L  Vitals: BP (!) 175/83 (BP Location: Right arm, Patient Position: Sitting)   Pulse 82   Temp 98.6 °F (37 °C) (Oral)   Resp 18   Ht 5' 10" (1.778 m)   Wt 108.7 kg (239 lb 10.2 oz)   SpO2 100%   BMI 34.38 kg/m²     Outcome Measures:  [facility] 6 Click ADL: 16    Cognition:   Alert and Cooperative  Command following: follows one-step commands  Communication: clear/fluent    Occupational Performance:  Bed Mobility:    Not assessed    Functional Mobility/Transfers:  Patient completed Sit <> Stand Transfer from chair with minimum assistance  with  rolling walker   Patient completed stand from toilet level with Min A using RW and grab bar  Static Standing Balance: CGA  Patient completed Toilet Transfer Step Transfer technique with minimum assistance with  " rolling walker  Patient completed functional mobility of household distance ~30ft with Min A using RW; required increased time due to increased fatigue, reports of dizziness, frequent standing rest breaks required.      Activities of Daily Living:  Grooming: stand by assistance to perform oral care and wash face seated UIC. CGA To wash B hands in stance at sink  Upper Body Dressing: moderate assistance to don gown as robe  Lower Body Dressing: maximal assistance to doff/don adult brief in supported stance  Toileting: stand by assistance to perform pericare seated at toilet level    [facility] 6 Click ADL:  [facility] Total Score: 16    Treatment & Education:  -Pt education on OT role and POC.  -Importance of E/OOB activity with staff assistance, emphasis on daily participation  -Safety during functional transfer and mobility ensured  -Patient provided with education on importance of Bilateral UB/LB integration during functional tasks for improvement in functional performance.   -Education provided/reviewed, questions answered within OT scope of practice.   -Patient demonstrates understanding and learning this date.         Patient left up in chair with all lines intact, call button in reach, chair alarm on, and nurse notified    GOALS:   Multidisciplinary Problems       Occupational Therapy Goals          Problem: Occupational Therapy    Goal Priority Disciplines Outcome Interventions   Occupational Therapy Goal     OT, PT/OT Ongoing, Progressing    Description: Goals to be met by: 1/7/2023 (1 week)     Patient will increase functional independence with ADLs by performing:    UE Dressing with Supervision.  LE Dressing with Supervision.  Grooming while standing at sink with Supervision.  Toileting from toilet with Supervision for hygiene and clothing management.   Rolling to Bilateral with [place].   Supine to sit with [place].  Step transfer with Supervision  Toilet transfer to toilet with Supervision.                          Time Tracking:     OT Date of Treatment: 01/03/23  OT Start Time: 0953  OT Stop Time: 1026  OT Total Time (min): 33 min    Billable Minutes:Self Care/Home Management 18  Therapeutic Activity 15      1/3/2023

## 2023-01-03 NOTE — SUBJECTIVE & OBJECTIVE
Neurologic Chief Complaint: CASTELLANOS and L A2 occlusion     Subjective:     Interval History: Patient is seen for follow-up neurological assessment and treatment recommendations:   NAEO, neuro exam stable. BP improved following increased HCTZ and resuming amlodipine. Echo complete and results pending. Therapy re-eval for updated dispo recs today.    HPI, Past Medical, Family, and Social History remains the same as documented in the initial encounter.     Review of Systems   Constitutional:  Negative for fever.   HENT:  Negative for drooling.    Eyes:  Negative for visual disturbance.   Respiratory:  Negative for cough.    Gastrointestinal:  Negative for diarrhea and vomiting.   Genitourinary:  Negative for difficulty urinating.   Neurological:  Positive for facial asymmetry, weakness and numbness. Negative for speech difficulty.   Psychiatric/Behavioral:  Negative for agitation, behavioral problems and confusion.    Scheduled Meds:   amLODIPine  10 mg Oral Daily    aspirin  81 mg Oral Daily    atorvastatin  40 mg Oral Daily    clopidogreL  75 mg Oral Daily    FLUoxetine  20 mg Oral Daily    heparin (porcine)  5,000 Units Subcutaneous Q8H    hydroCHLOROthiazide  25 mg Oral Daily    levothyroxine  50 mcg Oral Before breakfast    QUEtiapine  150 mg Oral Nightly    valsartan  160 mg Oral Daily     Continuous Infusions:   sodium chloride 0.9%       PRN Meds:acetaminophen, albuterol, labetaloL, melatonin, sodium chloride 0.9%, sodium chloride 0.9%    Objective:     Vital Signs (Most Recent):  Temp: 98.6 °F (37 °C) (01/03/23 1203)  Pulse: 82 (01/03/23 1203)  Resp: 18 (01/03/23 1203)  BP: (!) 175/83 (01/03/23 1203)  SpO2: 100 % (01/03/23 1203)  BP Location: Right arm    Vital Signs Range (Last 24H):  Temp:  [97.7 °F (36.5 °C)-98.6 °F (37 °C)]   Pulse:  [69-89]   Resp:  [16-18]   BP: (143-208)/(74-91)   SpO2:  [91 %-100 %]   BP Location: Right arm    Physical Exam  Vitals and nursing note reviewed.   Constitutional:        Appearance: She is obese. She is not ill-appearing.   HENT:      Head: Normocephalic and atraumatic.      Nose: No rhinorrhea.   Eyes:      General: No scleral icterus.     Extraocular Movements: Extraocular movements intact.   Cardiovascular:      Rate and Rhythm: Normal rate.   Pulmonary:      Effort: Pulmonary effort is normal. No respiratory distress.   Musculoskeletal:         General: No tenderness.   Skin:     General: Skin is warm and dry.   Neurological:      Mental Status: She is alert and oriented to person, place, and time.      Cranial Nerves: Facial asymmetry present.      Sensory: No sensory deficit.      Motor: Weakness (improving) present.   Psychiatric:         Behavior: Behavior is cooperative.       Neurological Exam:   LOC: alert  Attention Span: Good   Language: No aphasia  Articulation: No dysarthria  Orientation: Person, Place, Time   Visual Fields: Full  EOM (CN III, IV, VI): Full/intact  Facial Movement (CN VII): Lower facial weakness on the Right  Motor: Arm left  Normal 5/5  Leg left  Normal 5/5  Arm right  Paresis: 4/5  Leg right Paresis: 4/5  Sensation: intact to light touch    Laboratory:  CMP:   Recent Labs   Lab 01/03/23  0621   CALCIUM 10.2   ALBUMIN 3.5   PROT 7.5      K 3.8   CO2 26      BUN 12   CREATININE 0.8   ALKPHOS 76   ALT 22   AST 24   BILITOT 0.5       BMP:   Recent Labs   Lab 01/03/23  0621      K 3.8      CO2 26   BUN 12   CREATININE 0.8   CALCIUM 10.2       CBC:   Recent Labs   Lab 01/03/23  0621   WBC 4.93   RBC 4.56   HGB 13.4   HCT 41.8      MCV 92   MCH 29.4   MCHC 32.1       Lipid Panel:   Recent Labs   Lab 12/30/22  1603   CHOL 246*   LDLCALC 170.0*   HDL 59   TRIG 85       Coagulation:   Recent Labs   Lab 12/31/22  0523   INR 1.0   APTT 28.9       Hgb A1C:   Recent Labs   Lab 12/30/22  2304   HGBA1C 6.0*       TSH:   Recent Labs   Lab 12/30/22  1603   TSH 1.899         Diagnostic Results     Brain imaging:  MRI Brain WO Contrast  12/30/22  1. Small focus of acute/subacute lacunar infarction in the left corona radiata.  Recommend follow-up.  2. Involutional changes with chronic microvascular ischemic changes.  Remote left frontal cortical infarct.  3. Mild paranasal sinus disease.     CTH 12/30/22   Findings concerning for recent infarction involving the left frontal lobe and insular ribbon, as above. No intracranial hemorrhage. Sequela of mild senescent and chronic microvascular ischemic change with stable left caudate remote lacunar type infarct.       Vessel Imaging:  CTA H/N 12/30/22   1. Left anterior cerebral artery mid to distal A2 occlusion.  If clinical concern for acute infarction, further evaluation may be obtained with MRI.  2. CTA head and neck with no additional large vessel occlusion or dissection.      Cardiac Imaging   TTE  1/3/23 results pending

## 2023-01-03 NOTE — PLAN OF CARE
Step 1 - Admit - Current ([facility] - IP ISCHEMIC STROKE  - [facility])  RN: Depression Screen completed and passed  Outcome: Met  RN: Cruz Screen completed within 4 hours of admit and medication route accurately determined  Outcome: Met  RN: Education booklet given on day of admit  Outcome: Met  RN: SCDs applied  Outcome: Met  RN: Complete mobility protocol  Outcome: Met     Problem: Adult Inpatient Plan of Care  Goal: Plan of Care Review  Outcome: Ongoing, Progressing  Flowsheets (Taken 1/3/2023 0028)  Plan of Care Reviewed With: patient  Goal: Optimal Comfort and Wellbeing  Outcome: Ongoing, Progressing     Problem: Adjustment to Illness (Stroke, Ischemic/Transient Ischemic Attack)  Goal: Optimal Coping  Outcome: Ongoing, Progressing     Problem: Bowel Elimination Impaired (Stroke, Ischemic/Transient Ischemic Attack)  Goal: Effective Bowel Elimination  Outcome: Ongoing, Progressing     Problem: Cerebral Tissue Perfusion (Stroke, Ischemic/Transient Ischemic Attack)  Goal: Optimal Cerebral Tissue Perfusion  Outcome: Ongoing, Progressing     Problem: Respiratory Compromise (Stroke, Ischemic/Transient Ischemic Attack)  Goal: Effective Oxygenation and Ventilation  Outcome: Ongoing, Progressing     Problem: Urinary Elimination Impaired (Stroke, Ischemic/Transient Ischemic Attack)  Goal: Effective Urinary Elimination  Outcome: Ongoing, Progressing     Problem: Fall Injury Risk  Goal: Absence of Fall and Fall-Related Injury  Outcome: Ongoing, Progressing     Problem: Skin Injury Risk Increased  Goal: Skin Health and Integrity  Outcome: Ongoing, Progressing           POC updated and reviewed with the patient at the bedside. Questions regarding POC were encouraged and addressed. VSS, see flowsheets. Patient is AOX 4 at this time. Tele maintained. Fall and safety precautions maintained, no signs of injury noted during shift. Patient was repositioned for comfort with bed locked in low position, side rails up x 3, bed alarm set,  with call light within reach. Instructed patient to call staff for mobility, verbalized understanding. Stroke book and education reviewed at the bedside, see education flowsheets for details. No acute signs of distress noted at this time.

## 2023-01-03 NOTE — PROGRESS NOTES
Hardeep Dow - Neurosurgery ([facility])  Vascular Neurology  Comprehensive Stroke Center  Progress Note    Assessment/Plan:     * Cerebral infarction due to embolism of left middle cerebral artery  73 yo F with PMH of HTN, DM, obesity, and noncompliance with medications admitted with acute L MCA stroke. Presented to OSH with abnormal behavior followed by chest pain, abdominal pain, and HA.  on arrival. Telestroke with Dr. Bower, NIHSS 5. CTH with hypodensity in L insular cortex. Not eligible for TNK. CTA H/N with L KEITH mid to distal A2 occlusion. Patient transferred to [facility] for possible intervention and higher level of care. On arrival to [facility] NIHSS 8 initially. New RLE weakness and dysarthria from previous NIHSS 5 at OSH. Of note patient was given dilaudid prior to transfer. MRI ischemic protocol with L MCA infarct and L A2 occlusion, felt by IR to be chronic therefore not IR candidate. Etiology at this time ESUS, will need 30d cardiac event monitor at discharge.    NAEO, neuro exam stable. BP improved following increased HCTZ and resuming amlodipine. Echo complete and results pending. Therapy re-eval for updated dispo recs today.       Antithrombotics for secondary stroke prevention: Antiplatelets: Aspirin: 81 mg daily  Clopidogrel: 75 mg daily (loaded with DAPT at OSH)     Statins for secondary stroke prevention and hyperlipidemia, if present:   Statins: Atorvastatin- 40 mg daily, LDL goal <70 long term     Aggressive risk factor modification: HTN, DM, HLD, Diet, Exercise, Obesity     Rehab efforts: SNF     Diagnostics ordered/pending: TTE to assess cardiac function/status     VTE prophylaxis: Heparin 5000 units SQ every 8 hours  Mechanical prophylaxis: Place SCDs    BP parameters: SBP <180      History of medication noncompliance   on importance of medication compliance   Check that prescriptions are affordable to patient/ adequate financial assistance in place prior to discharge     BMI  34.0-34.9,adult  Stroke Risk Factor   Recommend counseling on diet and exercise when appropriate     Type 2 diabetes mellitus  Stroke Risk Factor    A1C 6   IP goal for glucose 140-180   SSI     Essential hypertension  Stroke Risk Factor   SBP <180, MAP >90  Improved with med titration yesterday  Continue Valsartan, HCTZ, and amlodipine  PRN labetalol/hydralazine    Tobacco abuse   on cessation when appropriate   Consider smoking cessation referral when discharged          12/31/2022 Patient c/o R calf pain today, US r/o DVT. 1/2 dose of home valsartan was started this morning for SBP > 220 despite PRN pushes. Goal < 220, will continue to monitor. TTE pending. EKG obtained for evaluation of Qtc given higher dose of seroquel at home. Therapy recommendations for SNF. On DAPT.   01/01/2023 Patient c/o R calf pain. US negative for DVT. Will apply lidocaine patch to assist with pain management along with already PRN tylenol. TTE pending. Pending SNF. Other 1/2 of home valsartan started yesterday for SBP > 200 still. Today will continue to monitor and add home HCTZ if needed for persistent hypertension.   01/02/2023 NAEO, neuro exam stable. Continues to have mild R hemiparesis. BP meds titrated for better control, echo pending to complete stroke workup.  01/03/2023 NAEO, neuro exam stable. BP improved following increased HCTZ and resuming amlodipine. Echo complete and results pending. Therapy re-eval for updated dispo recs today.      STROKE DOCUMENTATION   Acute Stroke Times   Last Known Normal Date: 12/30/22  Last Known Normal Time: 1330  Symptom Onset Date: 12/30/22  Symptom Onset Time: 1330  Stroke Team Called Date: 12/30/22  Stroke Team Called Time: 2104  Stroke Team Arrival Date: 12/30/22  Stroke Team Arrival Time: 2106  CT Interpretation Time: 1538  Thrombolytic Therapy Recommended: No  CTA Interpretation Time:  (Pending, patient lost access on initial attempt)  Thrombectomy Recommended: No  MRI Acute Stroke  Protocol Interpretation Time: 2130    NIH Scale:  1a. Level of Consciousness: 0-->Alert, keenly responsive  1b. LOC Questions: 0-->Answers both questions correctly  1c. LOC Commands: 0-->Performs both tasks correctly  2. Best Gaze: 0-->Normal  3. Visual: 0-->No visual loss  4. Facial Palsy: 1-->Minor paralysis (flattened nasolabial fold, asymmetry on smiling)  5a. Motor Arm, Left: 0-->No drift, limb holds 90 (or 45) degrees for full 10 secs  5b. Motor Arm, Right: 0-->No drift, limb holds 90 (or 45) degrees for full 10 secs  6a. Motor Leg, Left: 0-->No drift, leg holds 30 degree position for full 5 secs  6b. Motor Leg, Right: 1-->Drift, leg falls by the end of the 5-sec period but does not hit bed  7. Limb Ataxia: 0-->Absent  8. Sensory: 0-->Normal, no sensory loss  9. Best Language: 0-->No aphasia, normal  10. Dysarthria: 0-->Normal  11. Extinction and Inattention (formerly Neglect): 0-->No abnormality  Total (NIH Stroke Scale): 2       Modified Branch Score: 2  Samaria Coma Scale:    ABCD2 Score:    [id number] Score:   HAS -BLED Score:   ICH Score:   Hunt & Ulloa Classification:      Hemorrhagic change of an Ischemic Stroke: Does this patient have an ischemic stroke with hemorrhagic changes? No     Neurologic Chief Complaint: HA and L A2 occlusion     Subjective:     Interval History: Patient is seen for follow-up neurological assessment and treatment recommendations:   NAEO, neuro exam stable. BP improved following increased HCTZ and resuming amlodipine. Echo complete and results pending. Therapy re-eval for updated dispo recs today.    HPI, Past Medical, Family, and Social History remains the same as documented in the initial encounter.     Review of Systems   Constitutional:  Negative for fever.   HENT:  Negative for drooling.    Eyes:  Negative for visual disturbance.   Respiratory:  Negative for cough.    Gastrointestinal:  Negative for diarrhea and vomiting.   Genitourinary:  Negative for difficulty  urinating.   Neurological:  Positive for facial asymmetry, weakness and numbness. Negative for speech difficulty.   Psychiatric/Behavioral:  Negative for agitation, behavioral problems and confusion.    Scheduled Meds:   amLODIPine  10 mg Oral Daily    aspirin  81 mg Oral Daily    atorvastatin  40 mg Oral Daily    clopidogreL  75 mg Oral Daily    FLUoxetine  20 mg Oral Daily    heparin (porcine)  5,000 Units Subcutaneous Q8H    hydroCHLOROthiazide  25 mg Oral Daily    levothyroxine  50 mcg Oral Before breakfast    QUEtiapine  150 mg Oral Nightly    valsartan  160 mg Oral Daily     Continuous Infusions:   sodium chloride 0.9%       PRN Meds:acetaminophen, albuterol, labetaloL, melatonin, sodium chloride 0.9%, sodium chloride 0.9%    Objective:     Vital Signs (Most Recent):  Temp: 98.6 °F (37 °C) (01/03/23 1203)  Pulse: 82 (01/03/23 1203)  Resp: 18 (01/03/23 1203)  BP: (!) 175/83 (01/03/23 1203)  SpO2: 100 % (01/03/23 1203)  BP Location: Right arm    Vital Signs Range (Last 24H):  Temp:  [97.7 °F (36.5 °C)-98.6 °F (37 °C)]   Pulse:  [69-89]   Resp:  [16-18]   BP: (143-208)/(74-91)   SpO2:  [91 %-100 %]   BP Location: Right arm    Physical Exam  Vitals and nursing note reviewed.   Constitutional:       Appearance: She is obese. She is not ill-appearing.   HENT:      Head: Normocephalic and atraumatic.      Nose: No rhinorrhea.   Eyes:      General: No scleral icterus.     Extraocular Movements: Extraocular movements intact.   Cardiovascular:      Rate and Rhythm: Normal rate.   Pulmonary:      Effort: Pulmonary effort is normal. No respiratory distress.   Musculoskeletal:         General: No tenderness.   Skin:     General: Skin is warm and dry.   Neurological:      Mental Status: She is alert and oriented to person, place, and time.      Cranial Nerves: Facial asymmetry present.      Sensory: No sensory deficit.      Motor: Weakness (improving) present.   Psychiatric:         Behavior: Behavior is  cooperative.       Neurological Exam:   LOC: alert  Attention Span: Good   Language: No aphasia  Articulation: No dysarthria  Orientation: Person, Place, Time   Visual Fields: Full  EOM (CN III, IV, VI): Full/intact  Facial Movement (CN VII): Lower facial weakness on the Right  Motor: Arm left  Normal 5/5  Leg left  Normal 5/5  Arm right  Paresis: 4/5  Leg right Paresis: 4/5  Sensation: intact to light touch    Laboratory:  CMP:   Recent Labs   Lab 01/03/23  0621   CALCIUM 10.2   ALBUMIN 3.5   PROT 7.5      K 3.8   CO2 26      BUN 12   CREATININE 0.8   ALKPHOS 76   ALT 22   AST 24   BILITOT 0.5       BMP:   Recent Labs   Lab 01/03/23 0621      K 3.8      CO2 26   BUN 12   CREATININE 0.8   CALCIUM 10.2       CBC:   Recent Labs   Lab 01/03/23 0621   WBC 4.93   RBC 4.56   HGB 13.4   HCT 41.8      MCV 92   MCH 29.4   MCHC 32.1       Lipid Panel:   Recent Labs   Lab 12/30/22  1603   CHOL 246*   LDLCALC 170.0*   HDL 59   TRIG 85       Coagulation:   Recent Labs   Lab 12/31/22  0523   INR 1.0   APTT 28.9       Hgb A1C:   Recent Labs   Lab 12/30/22  2304   HGBA1C 6.0*       TSH:   Recent Labs   Lab 12/30/22  1603   TSH 1.899         Diagnostic Results     Brain imaging:  MRI Brain WO Contrast 12/30/22  1. Small focus of acute/subacute lacunar infarction in the left corona radiata.  Recommend follow-up.  2. Involutional changes with chronic microvascular ischemic changes.  Remote left frontal cortical infarct.  3. Mild paranasal sinus disease.     CTH 12/30/22   Findings concerning for recent infarction involving the left frontal lobe and insular ribbon, as above. No intracranial hemorrhage. Sequela of mild senescent and chronic microvascular ischemic change with stable left caudate remote lacunar type infarct.       Vessel Imaging:  CTA H/N 12/30/22   1. Left anterior cerebral artery mid to distal A2 occlusion.  If clinical concern for acute infarction, further evaluation may be obtained  with MRI.  2. CTA head and neck with no additional large vessel occlusion or dissection.      Cardiac Imaging   TTE  1/3/23 results pending      CAMMIE Delvalle  Comprehensive Stroke Center  Department of Vascular Neurology   [facility] - Neurosurgery [facility])

## 2023-01-03 NOTE — ASSESSMENT & PLAN NOTE
Stroke Risk Factor   SBP <180, MAP >90  Improved with med titration yesterday  Continue Valsartan, HCTZ, and amlodipine  PRN labetalol/hydralazine

## 2023-01-03 NOTE — PLAN OF CARE
[facility] - Neurosurgery (Hospital)  Initial Discharge Assessment       Primary Care Provider: Ashlie Fernando MD    Admission Diagnosis: Stroke [I63.9]    Admission Date: 12/30/2022  Expected Discharge Date:     Discharge Barriers Identified: (P) No family/friends to help (reports sister is in a WC and her son has PTSD)    Payor: [employer]ED MEDICARE / Plan: [employer] / Product Type: Medicare Advantage /     Extended Emergency Contact Information  Primary Emergency Contact: Ginny Khan   [facility]  Home Phone: [phone]  Mobile Phone: [phone]  Relation: Sister  Secondary Emergency Contact: bettychristie  Mobile Phone: [phone]  Relation: Sister  Preferred language: English   needed? No    Discharge Plan A: (P) Skilled Nursing Facility  Discharge Plan B: (P) Home Health      [id number] - [place], [place] - [address] [employer]  [address] [facility] [address]  Phone: [phone] Fax: [phone]      Initial Assessment (most recent)       Adult Discharge Assessment - 01/03/23 1406          Discharge Assessment    Assessment Type Discharge Planning Assessment (P)      Confirmed/corrected address, phone number and insurance Yes (P)      Confirmed Demographics Correct on Facesheet (P)      Source of Information patient (P)      When was your last doctors appointment? 11/08/22 (P)      Reason For Admission stroke (P)      People in Home sibling(s);child(claire), adult (P)      Facility Arrived From: home (P)      Do you expect to return to your current living situation? Yes (P)      Do you have help at home or someone to help you manage your care at home? No (P)      Prior to hospitilization cognitive status: Alert/Oriented (P)      Current cognitive status: Alert/Oriented (P)      Walking or Climbing Stairs ambulation difficulty, requires equipment (P)      Mobility Management has walker, but reports that it broke.  (P)      Home Accessibility wheelchair accessible (P)      Home Layout Able to live on 1st floor (P)      Equipment Currently Used at Home walker, standard (P)      Readmission within 30 days? No (P)      Patient currently being followed by outpatient case management? No (P)      Do you currently have service(s) that help you manage your care at home? No (P)      Do you take prescription medications? Yes (P)      Do you have prescription coverage? Yes (P)      Coverage PHN (P)      Do you have any problems affording any of your prescribed medications? No (P)      Is the patient taking medications as prescribed? yes (P)      Who is going to help you get home at discharge? son Noel (P)      How do you get to doctors appointments? family or friend will provide (P)      Are you on dialysis? No (P)      Do you take coumadin? No (P)      Discharge Plan A Skilled Nursing Facility (P)      Discharge Plan B Home Health (P)      DME Needed Upon Discharge  walker, standard (P)      Discharge Plan discussed with: Patient (P)      Discharge Barriers Identified No family/friends to help (P)    reports sister is in a WC and her son has PTSD       OTHER    Name(s) of People in Home sister Erica and son Noel (P)                    SW met with patient at bedside.  Patient lives in a [facility] with her son Noel and sister Erica.  Patient reports Erica is in a WC and she is her caregiver and that Noel has PTSD.  Patient reports she had a walker at home, but that it broke.  She has 2 steps going up to her front door and a ramp in the back.  She does not have HH, nor is she on HD or Coumadin.  Therapy is recommending SNF and patient is agreeable as long as it can be near her home in [place].  [facility] will send out referrals.  COLBY tomorrow 1/4.      Fouzia Langston, MERY  [facility]  [phone]

## 2023-01-04 PROCEDURE — 11000001 HC ACUTE MED/SURG PRIVATE ROOM

## 2023-01-04 PROCEDURE — 25000003 PHARM REV CODE 250: Performed by: PHYSICIAN ASSISTANT

## 2023-01-04 PROCEDURE — 99233 PR SUBSEQUENT HOSPITAL CARE,LEVL III: ICD-10-PCS | Mod: ,,, | Performed by: PSYCHIATRY & NEUROLOGY

## 2023-01-04 PROCEDURE — 63600175 PHARM REV CODE 636 W HCPCS: Performed by: PHYSICIAN ASSISTANT

## 2023-01-04 PROCEDURE — 94761 N-INVAS EAR/PLS OXIMETRY MLT: CPT

## 2023-01-04 PROCEDURE — 25000003 PHARM REV CODE 250: Performed by: NURSE PRACTITIONER

## 2023-01-04 PROCEDURE — 99233 SBSQ HOSP IP/OBS HIGH 50: CPT | Mod: ,,, | Performed by: PSYCHIATRY & NEUROLOGY

## 2023-01-04 RX ADMIN — AMLODIPINE BESYLATE 10 MG: 10 TABLET ORAL at 09:01

## 2023-01-04 RX ADMIN — FLUOXETINE 20 MG: 20 CAPSULE ORAL at 09:01

## 2023-01-04 RX ADMIN — ATORVASTATIN CALCIUM 40 MG: 20 TABLET, FILM COATED ORAL at 09:01

## 2023-01-04 RX ADMIN — QUETIAPINE FUMARATE 150 MG: 100 TABLET ORAL at 08:01

## 2023-01-04 RX ADMIN — HYDROCHLOROTHIAZIDE 25 MG: 25 TABLET ORAL at 09:01

## 2023-01-04 RX ADMIN — HEPARIN SODIUM 5000 UNITS: 5000 INJECTION INTRAVENOUS; SUBCUTANEOUS at 05:01

## 2023-01-04 RX ADMIN — HEPARIN SODIUM 5000 UNITS: 5000 INJECTION INTRAVENOUS; SUBCUTANEOUS at 01:01

## 2023-01-04 RX ADMIN — ASPIRIN 81 MG: 81 TABLET, COATED ORAL at 09:01

## 2023-01-04 RX ADMIN — CLOPIDOGREL BISULFATE 75 MG: 75 TABLET ORAL at 09:01

## 2023-01-04 RX ADMIN — VALSARTAN 160 MG: 40 TABLET, FILM COATED ORAL at 09:01

## 2023-01-04 RX ADMIN — LEVOTHYROXINE SODIUM 50 MCG: 50 TABLET ORAL at 05:01

## 2023-01-04 RX ADMIN — HEPARIN SODIUM 5000 UNITS: 5000 INJECTION INTRAVENOUS; SUBCUTANEOUS at 09:01

## 2023-01-04 NOTE — ASSESSMENT & PLAN NOTE
Stroke Risk Factor   SBP <180, MAP >90  Continue Valsartan, HCTZ, and amlodipine  PRN labetalol/hydralazine

## 2023-01-04 NOTE — PLAN OF CARE
Problem: Skin Injury Risk Increased  Goal: Skin Health and Integrity  Outcome: Ongoing, Progressing  Intervention: Optimize Skin Protection  Flowsheets (Taken 1/3/2023 1906)  Pressure Reduction Techniques:   frequent weight shift encouraged   weight shift assistance provided  Pressure Reduction Devices:   positioning supports utilized   pressure-redistributing mattress utilized  Skin Protection:   adhesive use limited   incontinence pads utilized  Head of Bed (HOB) Positioning: HOB at 30-45 degrees     Problem: Diabetes Comorbidity  Goal: Blood Glucose Level Within Targeted Range  Outcome: Ongoing, Progressing  Intervention: Monitor and Manage Glycemia  Flowsheets (Taken 1/3/2023 1906)  Glycemic Management: oral hydration promoted     Problem: Fall Injury Risk  Goal: Absence of Fall and Fall-Related Injury  Intervention: Promote Injury-Free Environment  Flowsheets (Taken 1/3/2023 1841)  Safety Promotion/Fall Prevention:   chair alarm set   Fall Risk reviewed with patient/family   nonskid shoes/socks when out of bed   side rails raised x 2       Problem: Functional Ability Impaired (Stroke, Ischemic/Transient Ischemic Attack)  Goal: Optimal Functional Ability  Outcome: Ongoing, Progressing  Intervention: Optimize Functional Ability  Flowsheets (Taken 1/3/2023 1841)  Self-Care Promotion:   independence encouraged   meal set-up provided  Activity Management:   Up in chair - L3   Standing - L3   Sitting at edge of bed - L2     Problem: Adjustment to Illness (Stroke, Ischemic/Transient Ischemic Attack)  Goal: Optimal Coping  Outcome: Ongoing, Progressing  Intervention: Support Psychosocial Response to Stroke  Flowsheets (Taken 1/3/2023 1841)  Supportive Measures:   active listening utilized   decision-making supported   positive reinforcement provided   self-care encouraged

## 2023-01-04 NOTE — PROGRESS NOTES
[facility] - Neurosurgery ([facility])  Vascular Neurology  Comprehensive Stroke Center  Progress Note    Assessment/Plan:     * Cerebral infarction due to embolism of left middle cerebral artery  71 yo F with PMH of HTN, DM, obesity, and noncompliance with medications admitted with acute L MCA stroke. Presented to OSH with abnormal behavior followed by chest pain, abdominal pain, and HA.  on arrival. Telestroke with Dr. Bower, NIHSS 5. CTH with hypodensity in L insular cortex. Not eligible for TNK. CTA H/N with L KEITH mid to distal A2 occlusion. Patient transferred to [facility] for possible intervention and higher level of care. On arrival to [facility] NIHSS 8 initially. New RLE weakness and dysarthria from previous NIHSS 5 at OSH. Of note patient was given dilaudid prior to transfer. MRI ischemic protocol with L MCA infarct and L A2 occlusion, felt by IR to be chronic therefore not IR candidate. Etiology at this time ESUS, will need 30d cardiac event monitor at discharge.    NAEO, neuro exam unchanged. BP continues to improve on current regimen. Echo unremarkable. Pending dispo to SNF, remains medically ready.        Antithrombotics for secondary stroke prevention: Antiplatelets: Aspirin: 81 mg daily  Clopidogrel: 75 mg daily x 30 days then ASA monotherapy    Statins for secondary stroke prevention and hyperlipidemia, if present:   Statins: Atorvastatin- 40 mg daily, LDL goal <70 long term     Aggressive risk factor modification: HTN, DM, HLD, Diet, Exercise, Obesity     Rehab efforts: SNF     Diagnostics ordered/pending: None     VTE prophylaxis: Heparin 5000 units SQ every 8 hours  Mechanical prophylaxis: Place SCDs    BP parameters: SBP <180      History of medication noncompliance   on importance of medication compliance   Check that prescriptions are affordable to patient/ adequate financial assistance in place prior to discharge     BMI 34.0-34.9,adult  Stroke Risk Factor   Recommend counseling  on diet and exercise when appropriate     Type 2 diabetes mellitus  Stroke Risk Factor    A1C 6   IP goal for glucose 140-180   SSI     Essential hypertension  Stroke Risk Factor   SBP <180, MAP >90  Continue Valsartan, HCTZ, and amlodipine  PRN labetalol/hydralazine    Tobacco abuse   on cessation when appropriate   Consider smoking cessation referral when discharged          12/31/2022 Patient c/o R calf pain today, US r/o DVT. 1/2 dose of home valsartan was started this morning for SBP > 220 despite PRN pushes. Goal < 220, will continue to monitor. TTE pending. EKG obtained for evaluation of Qtc given higher dose of seroquel at home. Therapy recommendations for SNF. On DAPT.   01/01/2023 Patient c/o R calf pain. US negative for DVT. Will apply lidocaine patch to assist with pain management along with already PRN tylenol. TTE pending. Pending SNF. Other 1/2 of home valsartan started yesterday for SBP > 200 still. Today will continue to monitor and add home HCTZ if needed for persistent hypertension.   01/02/2023 NAEO, neuro exam stable. Continues to have mild R hemiparesis. BP meds titrated for better control, echo pending to complete stroke workup.  01/03/2023 NAEO, neuro exam stable. BP improved following increased HCTZ and resuming amlodipine. Echo complete and results pending. Therapy re-eval for updated dispo recs today.  01/04/2023 NAEO, neuro exam unchanged. BP continues to improve on current regimen. Echo unremarkable. Pending dispo to SNF, remains medically ready.        STROKE DOCUMENTATION   Acute Stroke Times   Last Known Normal Date: 12/30/22  Last Known Normal Time: 1330  Symptom Onset Date: 12/30/22  Symptom Onset Time: 1330  Stroke Team Called Date: 12/30/22  Stroke Team Called Time: 2104  Stroke Team Arrival Date: 12/30/22  Stroke Team Arrival Time: 2106  CT Interpretation Time: 1538  Thrombolytic Therapy Recommended: No  CTA Interpretation Time:  (Pending, patient lost access on initial  attempt)  Thrombectomy Recommended: No  MRI Acute Stroke Protocol Interpretation Time: 2130    NIH Scale:  1a. Level of Consciousness: 0-->Alert, keenly responsive  1b. LOC Questions: 0-->Answers both questions correctly  1c. LOC Commands: 0-->Performs both tasks correctly  2. Best Gaze: 0-->Normal  3. Visual: 0-->No visual loss  4. Facial Palsy: 0-->Normal symmetrical movements  5a. Motor Arm, Left: 0-->No drift, limb holds 90 (or 45) degrees for full 10 secs  5b. Motor Arm, Right: 0-->No drift, limb holds 90 (or 45) degrees for full 10 secs  6a. Motor Leg, Left: 0-->No drift, leg holds 30 degree position for full 5 secs  6b. Motor Leg, Right: 1-->Drift, leg falls by the end of the 5-sec period but does not hit bed  7. Limb Ataxia: 0-->Absent  8. Sensory: 0-->Normal, no sensory loss  9. Best Language: 0-->No aphasia, normal  10. Dysarthria: 0-->Normal  11. Extinction and Inattention (formerly Neglect): 0-->No abnormality  Total (NIH Stroke Scale): 1       Modified Ward Score: 4  Samaria Coma Scale:    ABCD2 Score:    [id number] Score:   HAS -BLED Score:   ICH Score:   Hunt & Ulloa Classification:      Hemorrhagic change of an Ischemic Stroke: Does this patient have an ischemic stroke with hemorrhagic changes? No     Neurologic Chief Complaint: HA and L A2 occlusion     Subjective:     Interval History: Patient is seen for follow-up neurological assessment and treatment recommendations:   NAEO, neuro exam unchanged. BP continues to improve on current regimen. Echo unremarkable. Pending dispo to SNF, remains medically ready.    HPI, Past Medical, Family, and Social History remains the same as documented in the initial encounter.     Review of Systems   Constitutional:  Negative for fever.   HENT:  Negative for drooling.    Eyes:  Negative for visual disturbance.   Respiratory:  Negative for cough.    Gastrointestinal:  Negative for diarrhea and vomiting.   Genitourinary:  Negative for difficulty urinating.    Neurological:  Positive for facial asymmetry (resolved), weakness (improving) and numbness (resolved). Negative for speech difficulty.   Psychiatric/Behavioral:  Negative for agitation, behavioral problems and confusion.    Scheduled Meds:   amLODIPine  10 mg Oral Daily    aspirin  81 mg Oral Daily    atorvastatin  40 mg Oral Daily    clopidogreL  75 mg Oral Daily    FLUoxetine  20 mg Oral Daily    heparin (porcine)  5,000 Units Subcutaneous Q8H    hydroCHLOROthiazide  25 mg Oral Daily    levothyroxine  50 mcg Oral Before breakfast    QUEtiapine  150 mg Oral Nightly    valsartan  160 mg Oral Daily     Continuous Infusions:   sodium chloride 0.9%       PRN Meds:acetaminophen, albuterol, labetaloL, melatonin, sodium chloride 0.9%, sodium chloride 0.9%    Objective:     Vital Signs (Most Recent):  Temp: 97.4 °F (36.3 °C) (01/04/23 0829)  Pulse: 83 (01/04/23 1026)  Resp: 17 (01/04/23 0829)  BP: 133/61 (01/04/23 0934)  SpO2: 98 % (01/04/23 0829)  BP Location: Right arm    Vital Signs Range (Last 24H):  Temp:  [97.4 °F (36.3 °C)-98.9 °F (37.2 °C)]   Pulse:  [73-88]   Resp:  [16-18]   BP: (111-177)/(57-88)   SpO2:  [95 %-100 %]   BP Location: Right arm    Physical Exam  Vitals and nursing note reviewed.   Constitutional:       Appearance: She is obese. She is not ill-appearing.   HENT:      Head: Normocephalic and atraumatic.      Nose: No rhinorrhea.   Eyes:      General: No scleral icterus.     Extraocular Movements: Extraocular movements intact.   Cardiovascular:      Rate and Rhythm: Normal rate.   Pulmonary:      Effort: Pulmonary effort is normal. No respiratory distress.   Musculoskeletal:         General: No tenderness.   Skin:     General: Skin is warm and dry.   Neurological:      Mental Status: She is alert and oriented to person, place, and time.      Cranial Nerves: No facial asymmetry.      Sensory: No sensory deficit.      Motor: Weakness (improving) present.   Psychiatric:         Behavior:  Behavior is cooperative.       Neurological Exam:   LOC: alert  Attention Span: Good   Language: No aphasia  Articulation: No dysarthria  Orientation: Person, Place, Time   Visual Fields: Full  EOM (CN III, IV, VI): Full/intact  Facial Movement (CN VII): Symmetric facial expression    Motor: Arm left  Normal 5/5  Leg left  Normal 5/5  Arm right  Paresis: 4/5  Leg right Paresis: 4/5  Sensation: intact to light touch    Laboratory:  CMP:   No results for input(s): GLUCOSE, CALCIUM, ALBUMIN, PROT, NA, K, CO2, CL, BUN, CREATININE, ALKPHOS, ALT, AST, BILITOT in the last 24 hours.    BMP:   Recent Labs   Lab 01/03/23  0621      K 3.8      CO2 26   BUN 12   CREATININE 0.8   CALCIUM 10.2       CBC:   Recent Labs   Lab 01/03/23  0621   WBC 4.93   RBC 4.56   HGB 13.4   HCT 41.8      MCV 92   MCH 29.4   MCHC 32.1       Lipid Panel:   Recent Labs   Lab 12/30/22  1603   CHOL 246*   LDLCALC 170.0*   HDL 59   TRIG 85       Coagulation:   Recent Labs   Lab 12/31/22  0523   INR 1.0   APTT 28.9       Hgb A1C:   Recent Labs   Lab 12/30/22  2304   HGBA1C 6.0*       TSH:   Recent Labs   Lab 12/30/22  1603   TSH 1.899         Diagnostic Results     Brain imaging:  MRI Brain WO Contrast 12/30/22  1. Small focus of acute/subacute lacunar infarction in the left corona radiata.  Recommend follow-up.  2. Involutional changes with chronic microvascular ischemic changes.  Remote left frontal cortical infarct.  3. Mild paranasal sinus disease.     CTH 12/30/22   Findings concerning for recent infarction involving the left frontal lobe and insular ribbon, as above. No intracranial hemorrhage. Sequela of mild senescent and chronic microvascular ischemic change with stable left caudate remote lacunar type infarct.       Vessel Imaging:  CTA H/N 12/30/22   1. Left anterior cerebral artery mid to distal A2 occlusion.  If clinical concern for acute infarction, further evaluation may be obtained with MRI.  2. CTA head and neck with  no additional large vessel occlusion or dissection.      Cardiac Imaging   TTE  1/3/23   The left ventricle is normal in size with concentric remodeling and normal systolic function.   The estimated ejection fraction is 68%.   Mild left atrial enlargement.   Normal left ventricular diastolic function.   Normal right ventricular size with normal right ventricular systolic function.   Normal central venous pressure (3 mmHg).   The estimated PA systolic pressure is 22 mmHg.        CAMMIE Delvalle  [facility]  Department of Vascular Neurology   [facility] [place])

## 2023-01-04 NOTE — ASSESSMENT & PLAN NOTE
73 yo F with PMH of HTN, DM, obesity, and noncompliance with medications admitted with acute L MCA stroke. Presented to OSH with abnormal behavior followed by chest pain, abdominal pain, and HA.  on arrival. Telestroke with Dr. Bower, NIHSS 5. CTH with hypodensity in L insular cortex. Not eligible for TNK. CTA H/N with L KEITH mid to distal A2 occlusion. Patient transferred to [facility] for possible intervention and higher level of care. On arrival to [facility] NIHSS 8 initially. New RLE weakness and dysarthria from previous NIHSS 5 at OSH. Of note patient was given dilaudid prior to transfer. MRI ischemic protocol with L MCA infarct and L A2 occlusion, felt by IR to be chronic therefore not IR candidate. Etiology at this time ESUS, will need 30d cardiac event monitor at discharge.    NAEO, neuro exam unchanged. BP continues to improve on current regimen. Echo unremarkable. Pending dispo to SNF, remains medically ready.        Antithrombotics for secondary stroke prevention: Antiplatelets: Aspirin: 81 mg daily  Clopidogrel: 75 mg daily x 30 days then ASA monotherapy    Statins for secondary stroke prevention and hyperlipidemia, if present:   Statins: Atorvastatin- 40 mg daily, LDL goal <70 long term     Aggressive risk factor modification: HTN, DM, HLD, Diet, Exercise, Obesity     Rehab efforts: SNF     Diagnostics ordered/pending: None     VTE prophylaxis: Heparin 5000 units SQ every 8 hours  Mechanical prophylaxis: Place SCDs    BP parameters: SBP <180

## 2023-01-04 NOTE — SUBJECTIVE & OBJECTIVE
Neurologic Chief Complaint: CASTELLANOS and L A2 occlusion     Subjective:     Interval History: Patient is seen for follow-up neurological assessment and treatment recommendations:   NAEO, neuro exam unchanged. BP continues to improve on current regimen. Echo unremarkable. Pending dispo to SNF, remains medically ready.    HPI, Past Medical, Family, and Social History remains the same as documented in the initial encounter.     Review of Systems   Constitutional:  Negative for fever.   HENT:  Negative for drooling.    Eyes:  Negative for visual disturbance.   Respiratory:  Negative for cough.    Gastrointestinal:  Negative for diarrhea and vomiting.   Genitourinary:  Negative for difficulty urinating.   Neurological:  Positive for facial asymmetry (resolved), weakness (improving) and numbness (resolved). Negative for speech difficulty.   Psychiatric/Behavioral:  Negative for agitation, behavioral problems and confusion.    Scheduled Meds:   amLODIPine  10 mg Oral Daily    aspirin  81 mg Oral Daily    atorvastatin  40 mg Oral Daily    clopidogreL  75 mg Oral Daily    FLUoxetine  20 mg Oral Daily    heparin (porcine)  5,000 Units Subcutaneous Q8H    hydroCHLOROthiazide  25 mg Oral Daily    levothyroxine  50 mcg Oral Before breakfast    QUEtiapine  150 mg Oral Nightly    valsartan  160 mg Oral Daily     Continuous Infusions:   sodium chloride 0.9%       PRN Meds:acetaminophen, albuterol, labetaloL, melatonin, sodium chloride 0.9%, sodium chloride 0.9%    Objective:     Vital Signs (Most Recent):  Temp: 97.4 °F (36.3 °C) (01/04/23 0829)  Pulse: 83 (01/04/23 1026)  Resp: 17 (01/04/23 0829)  BP: 133/61 (01/04/23 0934)  SpO2: 98 % (01/04/23 0829)  BP Location: Right arm    Vital Signs Range (Last 24H):  Temp:  [97.4 °F (36.3 °C)-98.9 °F (37.2 °C)]   Pulse:  [73-88]   Resp:  [16-18]   BP: (111-177)/(57-88)   SpO2:  [95 %-100 %]   BP Location: Right arm    Physical Exam  Vitals and nursing note reviewed.   Constitutional:        Appearance: She is obese. She is not ill-appearing.   HENT:      Head: Normocephalic and atraumatic.      Nose: No rhinorrhea.   Eyes:      General: No scleral icterus.     Extraocular Movements: Extraocular movements intact.   Cardiovascular:      Rate and Rhythm: Normal rate.   Pulmonary:      Effort: Pulmonary effort is normal. No respiratory distress.   Musculoskeletal:         General: No tenderness.   Skin:     General: Skin is warm and dry.   Neurological:      Mental Status: She is alert and oriented to person, place, and time.      Cranial Nerves: No facial asymmetry.      Sensory: No sensory deficit.      Motor: Weakness (improving) present.   Psychiatric:         Behavior: Behavior is cooperative.       Neurological Exam:   LOC: alert  Attention Span: Good   Language: No aphasia  Articulation: No dysarthria  Orientation: Person, Place, Time   Visual Fields: Full  EOM (CN III, IV, VI): Full/intact  Facial Movement (CN VII): Symmetric facial expression    Motor: Arm left  Normal 5/5  Leg left  Normal 5/5  Arm right  Paresis: 4/5  Leg right Paresis: 4/5  Sensation: intact to light touch    Laboratory:  CMP:   No results for input(s): GLUCOSE, CALCIUM, ALBUMIN, PROT, NA, K, CO2, CL, BUN, CREATININE, ALKPHOS, ALT, AST, BILITOT in the last 24 hours.    BMP:   Recent Labs   Lab 01/03/23  0621      K 3.8      CO2 26   BUN 12   CREATININE 0.8   CALCIUM 10.2       CBC:   Recent Labs   Lab 01/03/23  0621   WBC 4.93   RBC 4.56   HGB 13.4   HCT 41.8      MCV 92   MCH 29.4   MCHC 32.1       Lipid Panel:   Recent Labs   Lab 12/30/22  1603   CHOL 246*   LDLCALC 170.0*   HDL 59   TRIG 85       Coagulation:   Recent Labs   Lab 12/31/22  0523   INR 1.0   APTT 28.9       Hgb A1C:   Recent Labs   Lab 12/30/22  2304   HGBA1C 6.0*       TSH:   Recent Labs   Lab 12/30/22  1603   TSH 1.899         Diagnostic Results     Brain imaging:  MRI Brain WO Contrast 12/30/22  1. Small focus of acute/subacute lacunar  infarction in the left corona radiata.  Recommend follow-up.  2. Involutional changes with chronic microvascular ischemic changes.  Remote left frontal cortical infarct.  3. Mild paranasal sinus disease.     CTH 12/30/22   Findings concerning for recent infarction involving the left frontal lobe and insular ribbon, as above. No intracranial hemorrhage. Sequela of mild senescent and chronic microvascular ischemic change with stable left caudate remote lacunar type infarct.       Vessel Imaging:  CTA H/N 12/30/22   1. Left anterior cerebral artery mid to distal A2 occlusion.  If clinical concern for acute infarction, further evaluation may be obtained with MRI.  2. CTA head and neck with no additional large vessel occlusion or dissection.      Cardiac Imaging   TTE  1/3/23  The left ventricle is normal in size with concentric remodeling and normal systolic function.  The estimated ejection fraction is 68%.  Mild left atrial enlargement.  Normal left ventricular diastolic function.  Normal right ventricular size with normal right ventricular systolic function.  Normal central venous pressure (3 mmHg).  The estimated PA systolic pressure is 22 mmHg.

## 2023-01-04 NOTE — PLAN OF CARE
01/04/23 1451   Post-Acute Status   Post-Acute Authorization Placement   Post-Acute Placement Status Referrals Sent   Coverage PHN   Discharge Plan   Discharge Plan A Skilled Nursing Facility     SW sent out referrals to SNF facilities in the [facility] and tried to have them on the [facility] ([place]int [place]) per her request. [facility] pending acceptance, several facilties are reviewing.      Fouzia Langston, MERY  [facility]  [phone]

## 2023-01-04 NOTE — PLAN OF CARE
Problem: Adult Inpatient Plan of Care  Goal: Plan of Care Review  Outcome: Ongoing, Progressing  Goal: Patient-Specific Goal (Individualized)  Outcome: Ongoing, Progressing  Goal: Absence of Hospital-Acquired Illness or Injury  Outcome: Ongoing, Progressing  Intervention: Identify and Manage Fall Risk  Flowsheets (Taken 1/4/2023 1256)  Safety Promotion/Fall Prevention:   assistive device/personal item within reach   Fall Risk reviewed with patient/family   Fall Risk signage in place   lighting adjusted   nonskid shoes/socks when out of bed   side rails raised x 3   instructed to call staff for mobility  Intervention: Prevent Skin Injury  Flowsheets (Taken 1/4/2023 1256)  Body Position: position changed independently  Skin Protection:   adhesive use limited   incontinence pads utilized  Intervention: Prevent and Manage VTE (Venous Thromboembolism) Risk  Flowsheets (Taken 1/4/2023 1256)  Activity Management:   Sitting at edge of bed - L2   Rolling - L1  VTE Prevention/Management: remove, assess skin, and reapply sequential compression device  Range of Motion: active ROM (range of motion) encouraged  Intervention: Prevent Infection  Flowsheets (Taken 1/4/2023 1256)  Infection Prevention:   hand hygiene promoted   single patient room provided  Goal: Optimal Comfort and Wellbeing  Outcome: Ongoing, Progressing  Intervention: Monitor Pain and Promote Comfort  Flowsheets (Taken 1/4/2023 1256)  Pain Management Interventions:   around-the-clock dosing utilized   care clustered   pillow support provided   position adjusted  Intervention: Provide Person-Centered Care  Flowsheets (Taken 1/4/2023 1256)  Trust Relationship/Rapport:   questions answered   care explained  Goal: Readiness for Transition of Care  Outcome: Ongoing, Progressing     Problem: Bowel Elimination Impaired (Stroke, Ischemic/Transient Ischemic Attack)  Goal: Effective Bowel Elimination  Outcome: Ongoing, Progressing  Intervention: Promote Effective Bowel  Elimination  Flowsheets (Taken 1/4/2023 1256)  Bowel Elimination Management: sitting position facilitated  Bowel Program: maintenance program followed     Problem: Cognitive Impairment (Stroke, Ischemic/Transient Ischemic Attack)  Goal: Optimal Cognitive Function  Outcome: Ongoing, Progressing  Intervention: Optimize Cognitive Function  Flowsheets (Taken 1/4/2023 1256)  Environment Familiarity/Consistency: daily routine followed  Reorientation Measures: clock in view     Problem: Skin Injury Risk Increased  Goal: Skin Health and Integrity  Outcome: Ongoing, Progressing  Intervention: Optimize Skin Protection  Flowsheets (Taken 1/4/2023 1256)  Pressure Reduction Techniques: frequent weight shift encouraged  Pressure Reduction Devices:   pressure-redistributing mattress utilized   positioning supports utilized  Skin Protection:   adhesive use limited   incontinence pads utilized  Head of Bed (HOB) Positioning: HOB at 30-45 degrees

## 2023-01-05 LAB
ANION GAP SERPL CALC-SCNC: 9 MMOL/L (ref 8–16)
BASOPHILS # BLD AUTO: 0.02 K/UL (ref 0–0.2)
BASOPHILS NFR BLD: 0.3 % (ref 0–1.9)
BUN SERPL-MCNC: 21 MG/DL (ref 8–23)
CALCIUM SERPL-MCNC: 10.9 MG/DL (ref 8.7–10.5)
CHLORIDE SERPL-SCNC: 101 MMOL/L (ref 95–110)
CO2 SERPL-SCNC: 26 MMOL/L (ref 23–29)
CREAT SERPL-MCNC: 0.8 MG/DL (ref 0.5–1.4)
DIFFERENTIAL METHOD: ABNORMAL
EOSINOPHIL # BLD AUTO: 0.4 K/UL (ref 0–0.5)
EOSINOPHIL NFR BLD: 6.4 % (ref 0–8)
ERYTHROCYTE [DISTWIDTH] IN BLOOD BY AUTOMATED COUNT: 14.6 % (ref 11.5–14.5)
EST. GFR  (NO RACE VARIABLE): >60 ML/MIN/1.73 M^2
GLUCOSE SERPL-MCNC: 128 MG/DL (ref 70–110)
HCT VFR BLD AUTO: 43.9 % (ref 37–48.5)
HGB BLD-MCNC: 14 G/DL (ref 12–16)
IMM GRANULOCYTES # BLD AUTO: 0.02 K/UL (ref 0–0.04)
IMM GRANULOCYTES NFR BLD AUTO: 0.3 % (ref 0–0.5)
LYMPHOCYTES # BLD AUTO: 2.5 K/UL (ref 1–4.8)
LYMPHOCYTES NFR BLD: 37.2 % (ref 18–48)
MCH RBC QN AUTO: 28.6 PG (ref 27–31)
MCHC RBC AUTO-ENTMCNC: 31.9 G/DL (ref 32–36)
MCV RBC AUTO: 90 FL (ref 82–98)
MONOCYTES # BLD AUTO: 0.6 K/UL (ref 0.3–1)
MONOCYTES NFR BLD: 8.5 % (ref 4–15)
NEUTROPHILS # BLD AUTO: 3.1 K/UL (ref 1.8–7.7)
NEUTROPHILS NFR BLD: 47.3 % (ref 38–73)
NRBC BLD-RTO: 0 /100 WBC
PLATELET # BLD AUTO: 282 K/UL (ref 150–450)
PMV BLD AUTO: 11.8 FL (ref 9.2–12.9)
POTASSIUM SERPL-SCNC: 4.5 MMOL/L (ref 3.5–5.1)
RBC # BLD AUTO: 4.9 M/UL (ref 4–5.4)
SODIUM SERPL-SCNC: 136 MMOL/L (ref 136–145)
WBC # BLD AUTO: 6.61 K/UL (ref 3.9–12.7)

## 2023-01-05 PROCEDURE — 94761 N-INVAS EAR/PLS OXIMETRY MLT: CPT

## 2023-01-05 PROCEDURE — 36415 COLL VENOUS BLD VENIPUNCTURE: CPT

## 2023-01-05 PROCEDURE — 11000001 HC ACUTE MED/SURG PRIVATE ROOM

## 2023-01-05 PROCEDURE — 85025 COMPLETE CBC W/AUTO DIFF WBC: CPT

## 2023-01-05 PROCEDURE — 30200315 PPD INTRADERMAL TEST REV CODE 302

## 2023-01-05 PROCEDURE — 25000003 PHARM REV CODE 250: Performed by: PHYSICIAN ASSISTANT

## 2023-01-05 PROCEDURE — 25000003 PHARM REV CODE 250: Performed by: NURSE PRACTITIONER

## 2023-01-05 PROCEDURE — 99233 PR SUBSEQUENT HOSPITAL CARE,LEVL III: ICD-10-PCS | Mod: ,,, | Performed by: PSYCHIATRY & NEUROLOGY

## 2023-01-05 PROCEDURE — 80048 BASIC METABOLIC PNL TOTAL CA: CPT

## 2023-01-05 PROCEDURE — 99233 SBSQ HOSP IP/OBS HIGH 50: CPT | Mod: ,,, | Performed by: PSYCHIATRY & NEUROLOGY

## 2023-01-05 PROCEDURE — 97116 GAIT TRAINING THERAPY: CPT

## 2023-01-05 PROCEDURE — 86580 TB INTRADERMAL TEST: CPT

## 2023-01-05 PROCEDURE — 97530 THERAPEUTIC ACTIVITIES: CPT

## 2023-01-05 PROCEDURE — 63600175 PHARM REV CODE 636 W HCPCS: Performed by: PHYSICIAN ASSISTANT

## 2023-01-05 RX ORDER — AMLODIPINE BESYLATE 10 MG/1
10 TABLET ORAL DAILY
Qty: 30 TABLET | Refills: 1
Start: 2023-01-06 | End: 2023-03-07

## 2023-01-05 RX ORDER — ATORVASTATIN CALCIUM 40 MG/1
40 TABLET, FILM COATED ORAL DAILY
Qty: 90 TABLET | Refills: 0
Start: 2023-01-06 | End: 2023-01-06 | Stop reason: SDUPTHER

## 2023-01-05 RX ORDER — CLOPIDOGREL BISULFATE 75 MG/1
75 TABLET ORAL DAILY
Qty: 30 TABLET | Refills: 0
Start: 2023-01-06 | End: 2023-02-05

## 2023-01-05 RX ORDER — QUETIAPINE FUMARATE 150 MG/1
150 TABLET, FILM COATED ORAL NIGHTLY
Qty: 30 TABLET | Refills: 0
Start: 2023-01-05 | End: 2023-02-04

## 2023-01-05 RX ADMIN — VALSARTAN 160 MG: 40 TABLET, FILM COATED ORAL at 08:01

## 2023-01-05 RX ADMIN — HEPARIN SODIUM 5000 UNITS: 5000 INJECTION INTRAVENOUS; SUBCUTANEOUS at 02:01

## 2023-01-05 RX ADMIN — HYDROCHLOROTHIAZIDE 25 MG: 25 TABLET ORAL at 08:01

## 2023-01-05 RX ADMIN — LEVOTHYROXINE SODIUM 50 MCG: 50 TABLET ORAL at 05:01

## 2023-01-05 RX ADMIN — FLUOXETINE 20 MG: 20 CAPSULE ORAL at 08:01

## 2023-01-05 RX ADMIN — HEPARIN SODIUM 5000 UNITS: 5000 INJECTION INTRAVENOUS; SUBCUTANEOUS at 05:01

## 2023-01-05 RX ADMIN — CLOPIDOGREL BISULFATE 75 MG: 75 TABLET ORAL at 08:01

## 2023-01-05 RX ADMIN — AMLODIPINE BESYLATE 10 MG: 10 TABLET ORAL at 08:01

## 2023-01-05 RX ADMIN — ASPIRIN 81 MG: 81 TABLET, COATED ORAL at 08:01

## 2023-01-05 RX ADMIN — QUETIAPINE FUMARATE 150 MG: 100 TABLET ORAL at 08:01

## 2023-01-05 RX ADMIN — TUBERCULIN PURIFIED PROTEIN DERIVATIVE 5 UNITS: 5 INJECTION, SOLUTION INTRADERMAL at 02:01

## 2023-01-05 RX ADMIN — HEPARIN SODIUM 5000 UNITS: 5000 INJECTION INTRAVENOUS; SUBCUTANEOUS at 09:01

## 2023-01-05 RX ADMIN — ATORVASTATIN CALCIUM 40 MG: 20 TABLET, FILM COATED ORAL at 08:01

## 2023-01-05 NOTE — SUBJECTIVE & OBJECTIVE
Neurologic Chief Complaint: CASTELLANOS and L A2 occlusion     Subjective:     Interval History: Patient is seen for follow-up neurological assessment and treatment recommendations:     No acute events overnight. BP better controlled on current regimen. Pending dispo to SNF, CXR,COVID swab and PPD ordered for placement per SW. Patient continues to complain of joint pain in LLE. Remains medically ready for discharge.    HPI, Past Medical, Family, and Social History remains the same as documented in the initial encounter.     Review of Systems   Constitutional:  Negative for fever.   HENT:  Negative for drooling.    Eyes:  Negative for visual disturbance.   Respiratory:  Negative for cough.    Gastrointestinal:  Negative for diarrhea and vomiting.   Genitourinary:  Negative for difficulty urinating.   Neurological:  Positive for weakness (improving) and numbness (resolved). Negative for speech difficulty.   Psychiatric/Behavioral:  Negative for agitation, behavioral problems and confusion.    Scheduled Meds:   amLODIPine  10 mg Oral Daily    aspirin  81 mg Oral Daily    atorvastatin  40 mg Oral Daily    clopidogreL  75 mg Oral Daily    FLUoxetine  20 mg Oral Daily    heparin (porcine)  5,000 Units Subcutaneous Q8H    hydroCHLOROthiazide  25 mg Oral Daily    levothyroxine  50 mcg Oral Before breakfast    QUEtiapine  150 mg Oral Nightly    tuberculin  5 Units Intradermal Once    valsartan  160 mg Oral Daily     Continuous Infusions:   sodium chloride 0.9%       PRN Meds:acetaminophen, albuterol, labetaloL, melatonin, sodium chloride 0.9%, sodium chloride 0.9%    Objective:     Vital Signs (Most Recent):  Temp: 98.6 °F (37 °C) (01/05/23 1249)  Pulse: 90 (01/05/23 1249)  Resp: 16 (01/05/23 1249)  BP: (!) 169/77 (01/05/23 1249)  SpO2: 98 % (01/05/23 1249)  BP Location: Left arm    Vital Signs Range (Last 24H):  Temp:  [97 °F (36.1 °C)-98.6 °F (37 °C)]   Pulse:  [75-93]   Resp:  [16-18]   BP: (125-181)/(62-84)   SpO2:  [95 %-99 %]    BP Location: Left arm    Physical Exam  Vitals and nursing note reviewed.   Constitutional:       Appearance: She is obese. She is not ill-appearing.   HENT:      Head: Normocephalic and atraumatic.      Right Ear: External ear normal.      Left Ear: External ear normal.      Nose: Nose normal.      Mouth/Throat:      Pharynx: Oropharynx is clear.   Eyes:      General: No scleral icterus.     Extraocular Movements: Extraocular movements intact.   Cardiovascular:      Rate and Rhythm: Normal rate.   Pulmonary:      Effort: Pulmonary effort is normal. No respiratory distress.   Abdominal:      General: There is no distension.   Musculoskeletal:         General: No tenderness.   Skin:     General: Skin is warm and dry.   Neurological:      Mental Status: She is alert and oriented to person, place, and time.      Cranial Nerves: No facial asymmetry.      Sensory: No sensory deficit.      Motor: Weakness (improving) present.   Psychiatric:         Mood and Affect: Mood normal.         Behavior: Behavior normal. Behavior is cooperative.       Neurological Exam:   LOC: alert  Attention Span: Good   Language: No aphasia  Articulation: No dysarthria  Orientation: Person, Place, Time   Visual Fields: Full  EOM (CN III, IV, VI): Full/intact  Facial Movement (CN VII): Symmetric facial expression    Motor: Arm left  Normal 5/5  Leg left  Normal 5/5  Arm right  Paresis: 4/5  Leg right Paresis: 4/5  Sensation: intact to light touch throughout    Laboratory:  CMP:   No results for input(s): GLUCOSE, CALCIUM, ALBUMIN, PROT, NA, K, CO2, CL, BUN, CREATININE, ALKPHOS, ALT, AST, BILITOT in the last 24 hours.    BMP:   Recent Labs   Lab 01/03/23  0621      K 3.8      CO2 26   BUN 12   CREATININE 0.8   CALCIUM 10.2       CBC:   Recent Labs   Lab 01/03/23  0621   WBC 4.93   RBC 4.56   HGB 13.4   HCT 41.8      MCV 92   MCH 29.4   MCHC 32.1       Lipid Panel:   Recent Labs   Lab 12/30/22  1603   CHOL 246*   LDLCALC  170.0*   HDL 59   TRIG 85       Coagulation:   Recent Labs   Lab 12/31/22  0523   INR 1.0   APTT 28.9       Hgb A1C:   Recent Labs   Lab 12/30/22  2304   HGBA1C 6.0*       TSH:   Recent Labs   Lab 12/30/22  1603   TSH 1.899         Diagnostic Results     Brain imaging:  MRI Brain WO Contrast 12/30/22  1. Small focus of acute/subacute lacunar infarction in the left corona radiata.  Recommend follow-up.  2. Involutional changes with chronic microvascular ischemic changes.  Remote left frontal cortical infarct.  3. Mild paranasal sinus disease.     CTH 12/30/22   Findings concerning for recent infarction involving the left frontal lobe and insular ribbon, as above. No intracranial hemorrhage. Sequela of mild senescent and chronic microvascular ischemic change with stable left caudate remote lacunar type infarct.       Vessel Imaging:  CTA H/N 12/30/22   1. Left anterior cerebral artery mid to distal A2 occlusion.  If clinical concern for acute infarction, further evaluation may be obtained with MRI.  2. CTA head and neck with no additional large vessel occlusion or dissection.      Cardiac Imaging   TTE  1/3/23  The left ventricle is normal in size with concentric remodeling and normal systolic function.  The estimated ejection fraction is 68%.  Mild left atrial enlargement.  Normal left ventricular diastolic function.  Normal right ventricular size with normal right ventricular systolic function.  Normal central venous pressure (3 mmHg).  The estimated PA systolic pressure is 22 mmHg.

## 2023-01-05 NOTE — PLAN OF CARE
Problem: Adult Inpatient Plan of Care  Goal: Optimal Comfort and Wellbeing  Outcome: Ongoing, Progressing     Problem: Adjustment to Illness (Stroke, Ischemic/Transient Ischemic Attack)  Goal: Optimal Coping  Outcome: Ongoing, Progressing     Problem: Cerebral Tissue Perfusion (Stroke, Ischemic/Transient Ischemic Attack)  Goal: Optimal Cerebral Tissue Perfusion  Outcome: Ongoing, Progressing     Problem: Cognitive Impairment (Stroke, Ischemic/Transient Ischemic Attack)  Goal: Optimal Cognitive Function  Outcome: Ongoing, Progressing       POC and stroke booklet reviewed with patient at the beside. Verbalization of understanding voiced. Questions and concerns addressed. Stroke booklet remains at the bedside. Precautions maintained. Patient progressing towards goals. Cardiac monitoring ongoing. Vital signs all shift. See flow sheet. Bed low and locked with call light and bed alarm activated. POC ongoing.

## 2023-01-05 NOTE — ASSESSMENT & PLAN NOTE
71 yo F with PMH of HTN, DM, obesity, and noncompliance with medications admitted with acute L MCA stroke. Presented to OSH with abnormal behavior followed by chest pain, abdominal pain, and HA.  on arrival. Telestroke with Dr. Bower, NIHSS 5. CTH with hypodensity in L insular cortex. Not eligible for TNK. CTA H/N with L KEITH mid to distal A2 occlusion. Patient transferred to [facility] for possible intervention and higher level of care. On arrival to [facility] NIHSS 8 initially,new RLE weakness and dysarthria from previous NIHSS 5 at OSH. Of note patient was given dilaudid prior to transfer. MRI ischemic protocol with L MCA infarct and L A2 occlusion, felt by IR to be chronic therefore not IR candidate. Echo with EF68%, mild LAE. Etiology at this time ESUS, will need 30d cardiac event monitor at discharge.    No acute events overnight. BP better controlled on current regimen. Pending dispo to SNF, CXR,COVID swab and PPD ordered for placement per SW. Patient continues to complain of joint pain in LLE. Remains medically ready for discharge.        Antithrombotics for secondary stroke prevention: Antiplatelets: Aspirin: 81 mg daily  Clopidogrel: 75 mg daily x 30 days then ASA monotherapy    Statins for secondary stroke prevention and hyperlipidemia, if present:   Statins: Atorvastatin- 40 mg daily, LDL goal <70 long term     Aggressive risk factor modification: HTN, DM, HLD, Diet, Exercise, Obesity     Rehab efforts: SNF     Diagnostics ordered/pending: None     VTE prophylaxis: Heparin 5000 units SQ every 8 hours  Mechanical prophylaxis: Place SCDs    BP parameters: SBP <180

## 2023-01-05 NOTE — PLAN OF CARE
Problem: Adult Inpatient Plan of Care  Goal: Plan of Care Review  Outcome: Ongoing, Progressing  Goal: Patient-Specific Goal (Individualized)  Outcome: Ongoing, Progressing  Goal: Absence of Hospital-Acquired Illness or Injury  Outcome: Ongoing, Progressing  Intervention: Identify and Manage Fall Risk  Flowsheets (Taken 1/5/2023 1256)  Safety Promotion/Fall Prevention:   Fall Risk signage in place   Fall Risk reviewed with patient/family   lighting adjusted   nonskid shoes/socks when out of bed  Intervention: Prevent Skin Injury  Flowsheets (Taken 1/5/2023 1256)  Body Position: position changed independently  Skin Protection: adhesive use limited  Intervention: Prevent and Manage VTE (Venous Thromboembolism) Risk  Flowsheets (Taken 1/5/2023 1259)  Activity Management:   Ambulated to bathroom - L4   Ambulated in tapia - L4   Sitting at edge of bed - L2   Standing - L3  VTE Prevention/Management: ambulation promoted  Range of Motion: active ROM (range of motion) encouraged  Goal: Optimal Comfort and Wellbeing  Outcome: Ongoing, Progressing  Goal: Readiness for Transition of Care  Outcome: Ongoing, Progressing

## 2023-01-05 NOTE — ASSESSMENT & PLAN NOTE
Stroke Risk Factor   SBP <180  Continue Valsartan, HCTZ, and amlodipine  PRN labetalol/hydralazine

## 2023-01-05 NOTE — ASSESSMENT & PLAN NOTE
Stroke RF   on cessation when appropriate   Consider smoking cessation referral when discharged if patient agreeable to it

## 2023-01-05 NOTE — PLAN OF CARE
[facility]    FACILITY TRANSFER ORDERS      Patient Name: Nicolás Khan  YOB: 1950    PCP: Ashlie Fernando MD   PCP Address: [address] [place] [id number]  PCP Phone Number: [phone]  PCP Fax: [phone]    Encounter Date: 01/06/2023    Admit to: SNF    Vital Signs:  Routine    Diagnoses:   Active Hospital Problems    Diagnosis  POA    *Cerebral infarction due to embolism of left middle cerebral artery [I63.412]  Yes    Tobacco abuse [Z72.0]  Yes    Essential hypertension [I10]  Yes    Type 2 diabetes mellitus [E11.9]  Yes    BMI 34.0-34.9,adult [Z68.34]  Not Applicable    History of medication noncompliance [Z91.14]  Not Applicable      Resolved Hospital Problems   No resolved problems to display.       Allergies:Review of patient's allergies indicates:  No Known Allergies    Diet: cardiac diet    Activities: Activity as tolerated, Commode at bedside, and Up with assistance, per facility protocol    Goals of Care Treatment Preferences:  Code Status: Full Code      Nursing: per facility protocol    Labs: per facility protocol    CONSULTS:    Physical Therapy to evaluate and treat.  and Occupational Therapy to evaluate and treat.    MISCELLANEOUS CARE:  per facility protocol    WOUND CARE ORDERS  per facility protocol    Medications: Review discharge medications with patient and family and provide education.      Current Discharge Medication List        START taking these medications    Details   amLODIPine (NORVASC) 10 MG tablet Take 1 tablet (10 mg total) by mouth once daily.  Qty: 30 tablet, Refills: 1    Comments: .      clopidogreL (PLAVIX) 75 mg tablet Take 1 tablet (75 mg total) by mouth once daily.  Qty: 30 tablet, Refills: 0           CONTINUE these medications which have CHANGED    Details   atorvastatin (LIPITOR) 40 MG tablet Take 1 tablet (40 mg total) by mouth once daily.  Qty: 90 tablet, Refills: 0      QUEtiapine 150 mg Tab Take 150 mg by  mouth nightly.  Qty: 30 tablet, Refills: 0           CONTINUE these medications which have NOT CHANGED    Details   ergocalciferol (ERGOCALCIFEROL) 50,000 unit Cap Take 50,000 Units by mouth once a week.      hydroCHLOROthiazide (HYDRODIURIL) 25 MG tablet Take 25 mg by mouth once daily.      albuterol (VENTOLIN HFA) 90 mcg/actuation inhaler Inhale 2 puffs into the lungs every 6 (six) hours as needed for Wheezing. Rescue      aspirin (ECOTRIN) 81 MG EC tablet Take 81 mg by mouth once daily.      famotidine (PEPCID) 40 MG tablet Take 40 mg by mouth nightly.      FLUoxetine (PROZAC) 20 MG capsule Take 20 mg by mouth once daily.      levothyroxine (SYNTHROID) 50 MCG tablet Take 50 mcg by mouth once daily.      metoclopramide HCl (REGLAN) 10 MG tablet Take 10 mg by mouth 3 (three) times daily before meals.      oxybutynin (DITROPAN XL) 10 MG 24 hr tablet Take 10 mg by mouth once daily.      umeclidinium-vilanterol 62.5-25 mcg/actuation DsDv Inhale 1 puff into the lungs once daily. Controller      valsartan (DIOVAN) 160 MG tablet Take 160 mg by mouth once daily.           STOP taking these medications       albuterol sulfate (PROAIR RESPICLICK) 90 mcg/actuation inhaler Comments:   Reason for Stopping:         meloxicam (MOBIC) 15 MG tablet Comments:   Reason for Stopping:         amLODIPine-valsartan-hcthiazid -25 mg Tab Comments:   Reason for Stopping:         carvedilol (COREG) 12.5 MG tablet Comments:   Reason for Stopping:         ketorolac (TORADOL) 10 mg tablet Comments:   Reason for Stopping:                  Immunizations Administered as of 1/6/2023       Name Date Dose VIS Date Route Exp Date    COVID-19, MRNA, LN-S, PF (Moderna) 4/20/2021 100 mL -- Intramuscular --    Site: Left deltoid     Lot: [id number]     COVID-19, MRNA, LN-S, PF (Moderna) 3/23/2021 100 mL -- Intramuscular --    Site: Left deltoid     Lot: [id number]             _________________________________  Vanda Goins,  ANIL  01/06/2023

## 2023-01-05 NOTE — PLAN OF CARE
01/05/23 1404   Post-Acute Status   Post-Acute Authorization Placement   Post-Acute Placement Status Pending payor medical review/second level review   Coverage PHN   Discharge Delays (!) Payor Issues   Discharge Plan   Discharge Plan A Skilled Nursing Facility     Patient accepted at [facility].  GENNARO provided Griselda at [facility] with the sister Ginny's contact info and she will arrive at the facility at 3pm to complete admission ppw.  GENNARO sent auth request to [facility] and waiting for confirmation of payment.      Fouzia Langston, MERY  [facility]  [phone]

## 2023-01-05 NOTE — PROGRESS NOTES
Hardeep Dow - Neurosurgery ([facility])  Vascular Neurology  Comprehensive Stroke Center  Progress Note    Assessment/Plan:     * Cerebral infarction due to embolism of left middle cerebral artery  71 yo F with PMH of HTN, DM, obesity, and noncompliance with medications admitted with acute L MCA stroke. Presented to OSH with abnormal behavior followed by chest pain, abdominal pain, and HA.  on arrival. Telestroke with Dr. Bower, NIHSS 5. CTH with hypodensity in L insular cortex. Not eligible for TNK. CTA H/N with L KEITH mid to distal A2 occlusion. Patient transferred to [facility] for possible intervention and higher level of care. On arrival to [facility] NIHSS 8 initially,new RLE weakness and dysarthria from previous NIHSS 5 at OSH. Of note patient was given dilaudid prior to transfer. MRI ischemic protocol with L MCA infarct and L A2 occlusion, felt by IR to be chronic therefore not IR candidate. Echo with EF68%, mild LAE. Etiology at this time ESUS, will need 30d cardiac event monitor at discharge.    No acute events overnight. BP better controlled on current regimen. Pending dispo to SNF, CXR,COVID swab and PPD ordered for placement per SW. Patient continues to complain of joint pain in LLE. Remains medically ready for discharge.        Antithrombotics for secondary stroke prevention: Antiplatelets: Aspirin: 81 mg daily  Clopidogrel: 75 mg daily x 30 days then ASA monotherapy    Statins for secondary stroke prevention and hyperlipidemia, if present:   Statins: Atorvastatin- 40 mg daily, LDL goal <70 long term     Aggressive risk factor modification: HTN, DM, HLD, Diet, Exercise, Obesity     Rehab efforts: SNF     Diagnostics ordered/pending: None     VTE prophylaxis: Heparin 5000 units SQ every 8 hours  Mechanical prophylaxis: Place SCDs    BP parameters: SBP <180      History of medication noncompliance   on importance of medication compliance   Check that prescriptions are affordable to patient/  adequate financial assistance in place prior to discharge     BMI 34.0-34.9,adult  Stroke Risk Factor    on diet and exercise when appropriate     Type 2 diabetes mellitus  Stroke Risk Factor    A1C 6   IP goal for glucose 140-180       Essential hypertension  Stroke Risk Factor   SBP <180  Continue Valsartan, HCTZ, and amlodipine  PRN labetalol/hydralazine    Tobacco abuse  Stroke RF   on cessation when appropriate   Consider smoking cessation referral when discharged if patient agreeable to it         12/31/2022 Patient c/o R calf pain today, US r/o DVT. 1/2 dose of home valsartan was started this morning for SBP > 220 despite PRN pushes. Goal < 220, will continue to monitor. TTE pending. EKG obtained for evaluation of Qtc given higher dose of seroquel at home. Therapy recommendations for SNF. On DAPT.   01/01/2023 Patient c/o R calf pain. US negative for DVT. Will apply lidocaine patch to assist with pain management along with already PRN tylenol. TTE pending. Pending SNF. Other 1/2 of home valsartan started yesterday for SBP > 200 still. Today will continue to monitor and add home HCTZ if needed for persistent hypertension.   01/02/2023 NAEO, neuro exam stable. Continues to have mild R hemiparesis. BP meds titrated for better control, echo pending to complete stroke workup.  01/03/2023 NAEO, neuro exam stable. BP improved following increased HCTZ and resuming amlodipine. Echo complete and results pending. Therapy re-eval for updated dispo recs today.  01/04/2023 NAEO, neuro exam unchanged. BP continues to improve on current regimen. Echo unremarkable. Pending dispo to SNF, remains medically ready.  1/5/2023 No acute events overnight. BP better controlled on current regimen. Pending dispo to SNF, CXR,COVID swab and PPD ordered for placement per SW. Patient continues to complain of joint pain in LLE. Remains medically ready for discharge.      STROKE DOCUMENTATION   Acute Stroke Times   Last Known  Normal Date: 12/30/22  Last Known Normal Time: 1330  Symptom Onset Date: 12/30/22  Symptom Onset Time: 1330  Stroke Team Called Date: 12/30/22  Stroke Team Called Time: 2104  Stroke Team Arrival Date: 12/30/22  Stroke Team Arrival Time: 2106  CT Interpretation Time: 1538  Thrombolytic Therapy Recommended: No  CTA Interpretation Time:  (Pending, patient lost access on initial attempt)  Thrombectomy Recommended: No  MRI Acute Stroke Protocol Interpretation Time: 2130    NIH Scale:  1a. Level of Consciousness: 0-->Alert, keenly responsive  1b. LOC Questions: 0-->Answers both questions correctly  1c. LOC Commands: 0-->Performs both tasks correctly  2. Best Gaze: 0-->Normal  3. Visual: 0-->No visual loss  4. Facial Palsy: 0-->Normal symmetrical movements  5a. Motor Arm, Left: 0-->No drift, limb holds 90 (or 45) degrees for full 10 secs  5b. Motor Arm, Right: 0-->No drift, limb holds 90 (or 45) degrees for full 10 secs  6a. Motor Leg, Left: 0-->No drift, leg holds 30 degree position for full 5 secs  6b. Motor Leg, Right: 1-->Drift, leg falls by the end of the 5-sec period but does not hit bed  7. Limb Ataxia: 0-->Absent  8. Sensory: 0-->Normal, no sensory loss  9. Best Language: 0-->No aphasia, normal  10. Dysarthria: 0-->Normal  11. Extinction and Inattention (formerly Neglect): 0-->No abnormality  Total (NIH Stroke Scale): 1       Modified Storey Score: 4  Sam[place] Coma Scale:    ABCD2 Score:    [id number] Score:   HAS -BLED Score:   ICH Score:   Hunt & Ulloa Classification:      Hemorrhagic change of an Ischemic Stroke: Does this patient have an ischemic stroke with hemorrhagic changes? No     Neurologic Chief Complaint: HA and L A2 occlusion     Subjective:     Interval History: Patient is seen for follow-up neurological assessment and treatment recommendations:     No acute events overnight. BP better controlled on current regimen. Pending dispo to SNF, CXR,COVID swab and PPD ordered for placement per SW. Patient  continues to complain of joint pain in LLE. Remains medically ready for discharge.    HPI, Past Medical, Family, and Social History remains the same as documented in the initial encounter.     Review of Systems   Constitutional:  Negative for fever.   HENT:  Negative for drooling.    Eyes:  Negative for visual disturbance.   Respiratory:  Negative for cough.    Gastrointestinal:  Negative for diarrhea and vomiting.   Genitourinary:  Negative for difficulty urinating.   Neurological:  Positive for weakness (improving) and numbness (resolved). Negative for speech difficulty.   Psychiatric/Behavioral:  Negative for agitation, behavioral problems and confusion.    Scheduled Meds:   amLODIPine  10 mg Oral Daily    aspirin  81 mg Oral Daily    atorvastatin  40 mg Oral Daily    clopidogreL  75 mg Oral Daily    FLUoxetine  20 mg Oral Daily    heparin (porcine)  5,000 Units Subcutaneous Q8H    hydroCHLOROthiazide  25 mg Oral Daily    levothyroxine  50 mcg Oral Before breakfast    QUEtiapine  150 mg Oral Nightly    tuberculin  5 Units Intradermal Once    valsartan  160 mg Oral Daily     Continuous Infusions:   sodium chloride 0.9%       PRN Meds:acetaminophen, albuterol, labetaloL, melatonin, sodium chloride 0.9%, sodium chloride 0.9%    Objective:     Vital Signs (Most Recent):  Temp: 98.6 °F (37 °C) (01/05/23 1249)  Pulse: 90 (01/05/23 1249)  Resp: 16 (01/05/23 1249)  BP: (!) 169/77 (01/05/23 1249)  SpO2: 98 % (01/05/23 1249)  BP Location: Left arm    Vital Signs Range (Last 24H):  Temp:  [97 °F (36.1 °C)-98.6 °F (37 °C)]   Pulse:  [75-93]   Resp:  [16-18]   BP: (125-181)/(62-84)   SpO2:  [95 %-99 %]   BP Location: Left arm    Physical Exam  Vitals and nursing note reviewed.   Constitutional:       Appearance: She is obese. She is not ill-appearing.   HENT:      Head: Normocephalic and atraumatic.      Right Ear: External ear normal.      Left Ear: External ear normal.      Nose: Nose normal.      Mouth/Throat:       Pharynx: Oropharynx is clear.   Eyes:      General: No scleral icterus.     Extraocular Movements: Extraocular movements intact.   Cardiovascular:      Rate and Rhythm: Normal rate.   Pulmonary:      Effort: Pulmonary effort is normal. No respiratory distress.   Abdominal:      General: There is no distension.   Musculoskeletal:         General: No tenderness.   Skin:     General: Skin is warm and dry.   Neurological:      Mental Status: She is alert and oriented to person, place, and time.      Cranial Nerves: No facial asymmetry.      Sensory: No sensory deficit.      Motor: Weakness (improving) present.   Psychiatric:         Mood and Affect: Mood normal.         Behavior: Behavior normal. Behavior is cooperative.       Neurological Exam:   LOC: alert  Attention Span: Good   Language: No aphasia  Articulation: No dysarthria  Orientation: Person, Place, Time   Visual Fields: Full  EOM (CN III, IV, VI): Full/intact  Facial Movement (CN VII): Symmetric facial expression    Motor: Arm left  Normal 5/5  Leg left  Normal 5/5  Arm right  Paresis: 4+/5  Leg right Paresis: 4/5  Sensation: intact to light touch throughout    Laboratory:  CMP:   No results for input(s): GLUCOSE, CALCIUM, ALBUMIN, PROT, NA, K, CO2, CL, BUN, CREATININE, ALKPHOS, ALT, AST, BILITOT in the last 24 hours.    BMP:   Recent Labs   Lab 01/03/23  0621      K 3.8      CO2 26   BUN 12   CREATININE 0.8   CALCIUM 10.2       CBC:   Recent Labs   Lab 01/03/23  0621   WBC 4.93   RBC 4.56   HGB 13.4   HCT 41.8      MCV 92   MCH 29.4   MCHC 32.1       Lipid Panel:   Recent Labs   Lab 12/30/22  1603   CHOL 246*   LDLCALC 170.0*   HDL 59   TRIG 85       Coagulation:   Recent Labs   Lab 12/31/22  0523   INR 1.0   APTT 28.9       Hgb A1C:   Recent Labs   Lab 12/30/22  2304   HGBA1C 6.0*       TSH:   Recent Labs   Lab 12/30/22  1603   TSH 1.899         Diagnostic Results     Brain imaging:  MRI Brain WO Contrast 12/30/22  1. Small focus of  acute/subacute lacunar infarction in the left corona radiata.  Recommend follow-up.  2. Involutional changes with chronic microvascular ischemic changes.  Remote left frontal cortical infarct.  3. Mild paranasal sinus disease.     CTH 12/30/22   Findings concerning for recent infarction involving the left frontal lobe and insular ribbon, as above. No intracranial hemorrhage. Sequela of mild senescent and chronic microvascular ischemic change with stable left caudate remote lacunar type infarct.       Vessel Imaging:  CTA H/N 12/30/22   1. Left anterior cerebral artery mid to distal A2 occlusion.  If clinical concern for acute infarction, further evaluation may be obtained with MRI.  2. CTA head and neck with no additional large vessel occlusion or dissection.      Cardiac Imaging   TTE  1/3/23   The left ventricle is normal in size with concentric remodeling and normal systolic function.   The estimated ejection fraction is 68%.   Mild left atrial enlargement.   Normal left ventricular diastolic function.   Normal right ventricular size with normal right ventricular systolic function.   Normal central venous pressure (3 mmHg).   The estimated PA systolic pressure is 22 mmHg.        Vanda Goins PA-C  Comprehensive Stroke Center  Department of Vascular Neurology   [facility] Neurosurgery [facility])

## 2023-01-05 NOTE — PLAN OF CARE
Problem: Physical Therapy  Goal: Physical Therapy Goal  Description: PT goals until 1/15/23    1. Pt supine to sit with mod independent-not met  2. Pt sit to supine with mod independent-not met  3. Pt sit to stand with RW with supervision-not met  4. Pt to perform gait 125ft with RW with supervision.-not met  5. Pt to up/down 2 steps with no rail with CGA.-not met  6. Pt to perform B LE exs in sitting or supine x 10 reps to strengthen B LE to improve functional mobility.-not met   Outcome: Ongoing, Progressing   Pt's goals remain appropriate and pt will continue to benefit from skilled PT services to work towards improved functional mobility including: bed mobility, transfers, and gait.   1/5/2023

## 2023-01-05 NOTE — PT/OT/SLP PROGRESS
"Physical Therapy Treatment    Patient Name:  Nicolás Khan   MRN:  [id number]    Recommendations:     Discharge Recommendations: nursing facility, skilled  Discharge Equipment Recommendations:  (TBD as pt progresses)  Barriers to discharge: Inaccessible home and Decreased caregiver support    Assessment:     Nicolás Khan is a 72 y.o. female admitted with a medical diagnosis of Cerebral infarction due to embolism of left middle cerebral artery.  She presents with the following impairments/functional limitations: weakness, impaired endurance, impaired self care skills, impaired functional mobility, gait instability, decreased safety awareness . Pt is unsafe with functional mobility at this time due to pt requires minimal assist for bed mobility, minimal assist for transfers, and minimal assist for gait due to instability, weakness, and fatigue. Pt is motivated to progress with functional mobility.     Rehab Prognosis: Good; patient would benefit from acute skilled PT services to address these deficits and reach maximum level of function.    Recent Surgery: * No surgery found *      Plan:     During this hospitalization, patient to be seen 4 x/week to address the identified rehab impairments via gait training, therapeutic activities, therapeutic exercises, neuromuscular re-education and progress toward the following goals:    Plan of Care Expires:  01/30/23    Subjective   "I can go around the back of my house to go up the ramp"  Pain/Comfort:  Pain Rating 1: 0/10  Pain Rating Post-Intervention 1: 0/10      Objective:     Communicated with nurse prior to session.  Patient found HOB elevated with PureWick, telemetry upon PT entry to room.     General Precautions: Standard, fall  Orthopedic Precautions: N/A  Braces: N/A  Respiratory Status: Nasal cannula, flow 1 L/min     Functional Mobility:  Bed Mobility:     Rolling Left:  minimum assistance  Supine to Sit: minimum assistance  Transfers:     Sit to Stand:  minimum " assistance with rolling walker  Gait: 10ft x 2 to/from bathroom then 40ft with RW with minimal assist. Pt required verbal cues to step closer to the walker and for upright posture. Pt performed gait with decreased step length and decreased clearance of B feet during swing  phase  Stairs:  Pt ascended/descended 3 stair(s) with No Assistive Device with right handrail with Moderate Assistance.     AM-PAC 6 CLICK MOBILITY  Turning over in bed (including adjusting bedclothes, sheets and blankets)?: 3  Sitting down on and standing up from a chair with arms (e.g., wheelchair, bedside commode, etc.): 3  Moving from lying on back to sitting on the side of the bed?: 3  Moving to and from a bed to a chair (including a wheelchair)?: 3  Need to walk in hospital room?: 3  Climbing 3-5 steps with a railing?: 2  Basic Mobility Total Score: 17     Treatment & Education:  Pt ambulated to the bathroom and urinated on the commode. Pt required assist to don a new brief after toileting. Nurse notified    Patient left up in chair with all lines intact, call button in reach, chair alarm on, and nurse notified..    GOALS:   Multidisciplinary Problems       Physical Therapy Goals          Problem: Physical Therapy    Goal Priority Disciplines Outcome Goal Variances Interventions   Physical Therapy Goal     PT, PT/OT Ongoing, Progressing     Description: PT goals until 1/15/23    1. Pt supine to sit with mod independent-not met  2. Pt sit to supine with mod independent-not met  3. Pt sit to stand with RW with supervision-not met  4. Pt to perform gait 125ft with RW with supervision.-not met  5. Pt to up/down 2 steps with no rail with CGA.-not met  6. Pt to perform B LE exs in sitting or supine x 10 reps to strengthen B LE to improve functional mobility.-not met                        Time Tracking:     PT Received On: 01/05/23  PT Start Time: 1027     PT Stop Time: 1059  PT Total Time (min): 32 min     Billable Minutes: Gait Training 20 and  Therapeutic Activity 12    Treatment Type: Treatment  PT/PTA: PT     PTA Visit Number: 1     01/05/2023

## 2023-01-06 VITALS
WEIGHT: 234.13 LBS | TEMPERATURE: 99 F | HEART RATE: 80 BPM | OXYGEN SATURATION: 99 % | SYSTOLIC BLOOD PRESSURE: 133 MMHG | HEIGHT: 70 IN | RESPIRATION RATE: 18 BRPM | DIASTOLIC BLOOD PRESSURE: 62 MMHG | BODY MASS INDEX: 33.52 KG/M2

## 2023-01-06 LAB
ANION GAP SERPL CALC-SCNC: 11 MMOL/L (ref 8–16)
BUN SERPL-MCNC: 23 MG/DL (ref 8–23)
CALCIUM SERPL-MCNC: 9.9 MG/DL (ref 8.7–10.5)
CHLORIDE SERPL-SCNC: 102 MMOL/L (ref 95–110)
CO2 SERPL-SCNC: 24 MMOL/L (ref 23–29)
CREAT SERPL-MCNC: 0.8 MG/DL (ref 0.5–1.4)
EST. GFR  (NO RACE VARIABLE): >60 ML/MIN/1.73 M^2
GLUCOSE SERPL-MCNC: 104 MG/DL (ref 70–110)
POTASSIUM SERPL-SCNC: 4.2 MMOL/L (ref 3.5–5.1)
SARS-COV-2 RNA RESP QL NAA+PROBE: NOT DETECTED
SODIUM SERPL-SCNC: 137 MMOL/L (ref 136–145)

## 2023-01-06 PROCEDURE — U0005 INFEC AGEN DETEC AMPLI PROBE: HCPCS | Performed by: STUDENT IN AN ORGANIZED HEALTH CARE EDUCATION/TRAINING PROGRAM

## 2023-01-06 PROCEDURE — 99233 PR SUBSEQUENT HOSPITAL CARE,LEVL III: ICD-10-PCS | Mod: ,,, | Performed by: STUDENT IN AN ORGANIZED HEALTH CARE EDUCATION/TRAINING PROGRAM

## 2023-01-06 PROCEDURE — 63600175 PHARM REV CODE 636 W HCPCS: Performed by: PHYSICIAN ASSISTANT

## 2023-01-06 PROCEDURE — 25000003 PHARM REV CODE 250: Performed by: PHYSICIAN ASSISTANT

## 2023-01-06 PROCEDURE — 80048 BASIC METABOLIC PNL TOTAL CA: CPT

## 2023-01-06 PROCEDURE — 36415 COLL VENOUS BLD VENIPUNCTURE: CPT

## 2023-01-06 PROCEDURE — 92507 TX SP LANG VOICE COMM INDIV: CPT

## 2023-01-06 PROCEDURE — 97530 THERAPEUTIC ACTIVITIES: CPT

## 2023-01-06 PROCEDURE — 99233 SBSQ HOSP IP/OBS HIGH 50: CPT | Mod: ,,, | Performed by: STUDENT IN AN ORGANIZED HEALTH CARE EDUCATION/TRAINING PROGRAM

## 2023-01-06 PROCEDURE — 97535 SELF CARE MNGMENT TRAINING: CPT

## 2023-01-06 RX ORDER — ATORVASTATIN CALCIUM 40 MG/1
80 TABLET, FILM COATED ORAL NIGHTLY
Qty: 90 TABLET | Refills: 0
Start: 2023-01-06 | End: 2023-02-20

## 2023-01-06 RX ADMIN — LEVOTHYROXINE SODIUM 50 MCG: 50 TABLET ORAL at 05:01

## 2023-01-06 RX ADMIN — CLOPIDOGREL BISULFATE 75 MG: 75 TABLET ORAL at 08:01

## 2023-01-06 RX ADMIN — ATORVASTATIN CALCIUM 40 MG: 20 TABLET, FILM COATED ORAL at 08:01

## 2023-01-06 RX ADMIN — HYDROCHLOROTHIAZIDE 25 MG: 25 TABLET ORAL at 08:01

## 2023-01-06 RX ADMIN — ASPIRIN 81 MG: 81 TABLET, COATED ORAL at 08:01

## 2023-01-06 RX ADMIN — HEPARIN SODIUM 5000 UNITS: 5000 INJECTION INTRAVENOUS; SUBCUTANEOUS at 05:01

## 2023-01-06 RX ADMIN — FLUOXETINE 20 MG: 20 CAPSULE ORAL at 08:01

## 2023-01-06 RX ADMIN — AMLODIPINE BESYLATE 10 MG: 10 TABLET ORAL at 08:01

## 2023-01-06 RX ADMIN — VALSARTAN 160 MG: 40 TABLET, FILM COATED ORAL at 08:01

## 2023-01-06 NOTE — PLAN OF CARE
Problem: Adult Inpatient Plan of Care  Goal: Optimal Comfort and Wellbeing  Outcome: Ongoing, Progressing  Goal: Readiness for Transition of Care  Outcome: Ongoing, Progressing     Problem: Adult Inpatient Plan of Care  Goal: Readiness for Transition of Care  Outcome: Ongoing, Progressing     Problem: Adjustment to Illness (Stroke, Ischemic/Transient Ischemic Attack)  Goal: Optimal Coping  Outcome: Ongoing, Progressing       POC and stroke booklet reviewed with patient at the beside. Verbalization of understanding voiced. Questions and concerns addressed. Stroke booklet remains at the bedside. Precautions maintained. Patient progressing towards goals. Cardiac monitoring ongoing. Vital signs all shift. See flow sheet. Bed low and locked with call light and bed alarm activated. POC ongoing.

## 2023-01-06 NOTE — PT/OT/SLP PROGRESS
Occupational Therapy   Treatment    Name: Nicolás Khan  MRN: [id number]  Admitting Diagnosis:  Cerebral infarction due to embolism of left middle cerebral artery       Recommendations:     Discharge Recommendations: nursing facility, skilled  Discharge Equipment Recommendations:  other (see comments) (TBD by rehab facility)  Barriers to discharge:  None    Assessment:     Nicolás Khan is a 72 y.o. female with a medical diagnosis of Cerebral infarction due to embolism of left middle cerebral artery. Performance deficits affecting function are weakness, impaired endurance, impaired self care skills, impaired functional mobility, gait instability, impaired balance, impaired coordination, impaired fine motor.     Pt presents with improved occupational performance today as demo'd throughout tx session with toileting on commode in bathroom req'ing SBA for safety, Min A to terri b/l socks while seated EOC, and SBA during standing grooming tasks at sink side for ~5 minutes. Pt demo'd improved activity tolerance with fxnl mobility as pt ambulated ~60 ft w/ RW with CGA and per pt request, demo'd ability to ascend/descend 3 steps in stair well with Min A. Pt is progressing towards fxnl goals and would benefit from continued rehab services to address the above listed performance deficits. Cont POC for D/C to SNF today.   Plan:     Patient to be seen 3 x/week to address the above listed problems via self-care/home management, therapeutic activities, therapeutic exercises, neuromuscular re-education, sensory integration  Plan of Care Expires: 01/31/23  Plan of Care Reviewed with: patient    Subjective     Pain/Comfort:  Pain Rating 1: 0/10 (no pain rated)  Pain Rating Post-Intervention 1: 0/10 (no pain rated)    Objective:     Communicated with: nurse prior to session.  Patient found up in chair with telemetry upon OT entry to room.    General Precautions: Standard, fall    Orthopedic Precautions:N/A  Braces: N/A  Respiratory  Status: Room air    Functional Mobility/Transfers:  Patient completed Sit <> Stand Transfer with contact guard assistance  with  hand-held assist   Patient completed Bed <> Chair Transfer using Step Transfer technique with contact guard assistance with rolling walker  Patient completed Toilet Transfer Step Transfer technique with contact guard assistance with  rolling walker  Functional Mobility: Pt engaged in functional mobility to simulate household/community distances with CGA x 1 w/ RW  in order to maximize functional activity tolerance required for engagement in occupations of choice.  pt ambulated ~60 ft req'ing increased time and moderate VC's for RW management  per pt request, pt demo'd ability to ascend/descend 3 steps in stair well with Min A x 1 for safety  Pt reported 1 bout of dizziness s/p ascending 3 steps, req'ing quick standing rest break lasting ~30 seconds before descending.     Activities of Daily Living:  Grooming: stand by assistance to complete handwashing & brushing teeth at sink while standing  Upper Body Dressing: minimum assistance to terri back gown seated EOC  Lower Body Dressing: minimum assistance to terri b/l socks seated EOC  Toileting: stand by assistance for safety to complete lidia hygiene and LB clothing management  VC's for utilizing grab bar and managing RW with toileting transfer       [facility] 6 Click ADL: 19    Treatment & Education:  Pt educated on role of OT, POC, and goals for therapy.    POC was dicussed with patient/caregiver, who was included in its development and is in agreement with the identified goals and treatment plan.   Patient and family aware of patient's deficits and therapy progression.   Time provided for therapeutic counseling and discussion of health disposition.   Educated on importance of EOB/OOB mobility, maintaining routine, sitting up in chair, and maximizing independence with ADLs during admission   Pt completed ADLs and functional mobility for treatment  session as noted above   Pt/caregiver verbalized understanding and expressed no further concerns/questions.      Patient left up in chair with all lines intact and call button in reach    GOALS:   Multidisciplinary Problems       Occupational Therapy Goals          Problem: Occupational Therapy    Goal Priority Disciplines Outcome Interventions   Occupational Therapy Goal     OT, PT/OT Ongoing, Progressing    Description: Goals to be met by: 1/7/2023 (1 week)     Patient will increase functional independence with ADLs by performing:    UE Dressing with Supervision.  LE Dressing with Supervision.  Grooming while standing at sink with Supervision.  Toileting from toilet with Supervision for hygiene and clothing management.   Rolling to Bilateral with [place].   Supine to sit with [place].  Step transfer with Supervision  Toilet transfer to toilet with Supervision.                         Time Tracking:     OT Date of Treatment: 01/06/23  OT Start Time: 1340  OT Stop Time: 1403  OT Total Time (min): 23 min    Billable Minutes:Self Care/Home Management 10  Therapeutic Activity 13    OT/BILL: OT     BILL Visit Number: 0    1/6/2023

## 2023-01-06 NOTE — ASSESSMENT & PLAN NOTE
Stroke RF  Counseled on cessation   Patient agreeable to referral for smoking cessation and states she does not feel like smoking anymore  Smoking cessation referral placed

## 2023-01-06 NOTE — ASSESSMENT & PLAN NOTE
73 yo F with PMHx of HTN, DM, obesity, and noncompliance with medications admitted with acute L MCA stroke. Presented to OSH with abnormal behavior followed by chest pain, abdominal pain, and HA.  on arrival. Telestroke with Dr. Bower, NIHSS 5. CTH with hypodensity in L insular cortex. Not eligible for TNK. CTA H/N with L KEITH mid to distal A2 occlusion. Patient transferred to [facility] for possible intervention and higher level of care. On arrival to [facility] NIHSS 8 initially,new RLE weakness and dysarthria from previous NIHSS 5 at OSH. Of note patient was given dilaudid prior to transfer. MRI ischemic protocol with L MCA infarct(CR) and L A2 occlusion, felt by IR to be chronic therefore not IR candidate. Echo with EF68%, mild LAE.   -Etiology at this time for current CR infarct is most likely . However, will discharge patient with order for cardiac monitor for prior embolic appearing insular cortex infarct and mild LAE seen on echo.  -No acute events overnight. Patient will be discharged to SNF today. Referral to smoking cessation program. She will be discharged on DAPT and atorvastatin 80.         Antithrombotics for secondary stroke prevention: Antiplatelets: Aspirin: 81 mg daily  Clopidogrel: 75 mg daily x 30 days then ASA monotherapy    Statins for secondary stroke prevention and hyperlipidemia, if present:   Statins: Atorvastatin- 80 mg daily, LDL goal <70 long term     Aggressive risk factor modification: HTN, DM, HLD, Diet, Exercise, Obesity     Rehab efforts: SNF     Diagnostics ordered/pending: None     VTE prophylaxis: Heparin 5000 units SQ every 8 hours  Mechanical prophylaxis: Place SCDs    BP parameters: SBP <180

## 2023-01-06 NOTE — SUBJECTIVE & OBJECTIVE
Neurologic Chief Complaint: CASTELLANOS and L A2 occlusion     Subjective:     Interval History: Patient is seen for follow-up neurological assessment and treatment recommendations:     No acute events overnight. Patient remains medically ready for discharge to SNF. She will be discharged to SNF today. Cardiac monitor at time of discharge for prior embolic insular cortex infarct. Etiology for current CR infarct most likely . Patient to be referred to smoking cessation program. She will be discharged on DAPT and atorvastatin 80.     HPI, Past Medical, Family, and Social History remains the same as documented in the initial encounter.     Review of Systems   Constitutional:  Negative for fever.   HENT:  Negative for drooling.    Eyes:  Negative for visual disturbance.   Respiratory:  Negative for cough.    Gastrointestinal:  Negative for diarrhea and vomiting.   Genitourinary:  Negative for difficulty urinating.   Musculoskeletal:         +Joint pain   Neurological:  Positive for weakness (improving) and numbness (resolved). Negative for speech difficulty.   Psychiatric/Behavioral:  Negative for agitation, behavioral problems and confusion.    Scheduled Meds:   amLODIPine  10 mg Oral Daily    aspirin  81 mg Oral Daily    atorvastatin  40 mg Oral Daily    clopidogreL  75 mg Oral Daily    FLUoxetine  20 mg Oral Daily    heparin (porcine)  5,000 Units Subcutaneous Q8H    hydroCHLOROthiazide  25 mg Oral Daily    levothyroxine  50 mcg Oral Before breakfast    QUEtiapine  150 mg Oral Nightly    valsartan  160 mg Oral Daily     Continuous Infusions:   sodium chloride 0.9%       PRN Meds:acetaminophen, albuterol, labetaloL, melatonin, sodium chloride 0.9%, sodium chloride 0.9%    Objective:     Vital Signs (Most Recent):  Temp: 98.6 °F (37 °C) (23 1229)  Pulse: 80 (23 1229)  Resp: 18 (23 1229)  BP: 133/62 (23 1229)  SpO2: 99 % (23 1229)  BP Location: Left arm    Vital Signs Range (Last 24H):  Temp:   [97 °F (36.1 °C)-98.6 °F (37 °C)]   Pulse:  [74-91]   Resp:  [16-19]   BP: (126-197)/(61-93)   SpO2:  [96 %-99 %]   BP Location: Left arm    Physical Exam  Vitals and nursing note reviewed.   Constitutional:       Appearance: She is obese. She is not ill-appearing.   HENT:      Head: Normocephalic and atraumatic.      Right Ear: External ear normal.      Left Ear: External ear normal.      Nose: Nose normal.      Mouth/Throat:      Pharynx: Oropharynx is clear.   Eyes:      General: No scleral icterus.     Extraocular Movements: Extraocular movements intact.   Cardiovascular:      Rate and Rhythm: Normal rate.   Pulmonary:      Effort: Pulmonary effort is normal. No respiratory distress.   Abdominal:      General: There is no distension.   Musculoskeletal:         General: No tenderness.   Skin:     General: Skin is warm and dry.   Neurological:      Mental Status: She is alert and oriented to person, place, and time.      Cranial Nerves: No facial asymmetry.      Sensory: No sensory deficit.      Motor: Weakness (improving) present.   Psychiatric:         Mood and Affect: Mood normal.         Behavior: Behavior normal. Behavior is cooperative.       Neurological Exam:   LOC: alert  Attention Span: Good   Language: No aphasia  Articulation: No dysarthria  Orientation: Person, Place, Time   Visual Fields: Full  EOM (CN III, IV, VI): Full/intact  Facial Movement (CN VII): Symmetric facial expression    Motor: Arm left  Normal 5/5  Leg left  Normal 5/5  Arm right  Paresis: 4/5  Leg right Paresis: 4/5  Sensation: intact to light touch throughout    Laboratory:  CMP:   Recent Labs   Lab 01/06/23  0259   CALCIUM 9.9      K 4.2   CO2 24      BUN 23   CREATININE 0.8       BMP:   Recent Labs   Lab 01/06/23  0259      K 4.2      CO2 24   BUN 23   CREATININE 0.8   CALCIUM 9.9       CBC:   Recent Labs   Lab 01/05/23  1301   WBC 6.61   RBC 4.90   HGB 14.0   HCT 43.9      MCV 90   MCH 28.6   MCHC  31.9*       Lipid Panel:   Recent Labs   Lab 12/30/22  1603   CHOL 246*   LDLCALC 170.0*   HDL 59   TRIG 85       Coagulation:   Recent Labs   Lab 12/31/22  0523   INR 1.0   APTT 28.9       Hgb A1C:   Recent Labs   Lab 12/30/22  2304   HGBA1C 6.0*       TSH:   Recent Labs   Lab 12/30/22  1603   TSH 1.899         Diagnostic Results     Brain imaging:  MRI Brain WO Contrast 12/30/22  1. Small focus of acute/subacute lacunar infarction in the left corona radiata.  Recommend follow-up.  2. Involutional changes with chronic microvascular ischemic changes.  Remote left frontal cortical infarct.  3. Mild paranasal sinus disease.     CTH 12/30/22   Findings concerning for recent infarction involving the left frontal lobe and insular ribbon, as above. No intracranial hemorrhage. Sequela of mild senescent and chronic microvascular ischemic change with stable left caudate remote lacunar type infarct.       Vessel Imaging:  CTA H/N 12/30/22   1. Left anterior cerebral artery mid to distal A2 occlusion.  If clinical concern for acute infarction, further evaluation may be obtained with MRI.  2. CTA head and neck with no additional large vessel occlusion or dissection.      Cardiac Imaging   TTE  1/3/23  The left ventricle is normal in size with concentric remodeling and normal systolic function.  The estimated ejection fraction is 68%.  Mild left atrial enlargement.  Normal left ventricular diastolic function.  Normal right ventricular size with normal right ventricular systolic function.  Normal central venous pressure (3 mmHg).  The estimated PA systolic pressure is 22 mmHg.

## 2023-01-06 NOTE — PLAN OF CARE
01/06/23 1213   Final Note   Assessment Type Final Discharge Note   Anticipated Discharge Disposition SNF     Patient discharging to [facility]) rehab.  SW provided RN with number for report and set up WC transport for 230pm.  SW will notify patient and her sister Ginny of transport time.      Fouzia Langston LMSW  [facility]  [phone]

## 2023-01-06 NOTE — PLAN OF CARE
SSC met with patient/family at bedside. Patient experience rounding completed and reviewed the following.     Do you know your discharge plan? Yes    If yes, what is the plan?  SNF    If you are discharging home, do you have help at home? Patient going to SNF     Do you think you will need help at home at discharge? No     Have you discussed your needs and preferences with your SW/CM? Yes     Assigned SW/CM notified of any patient/family needs or concerns.

## 2023-01-06 NOTE — PT/OT/SLP PROGRESS
Speech Language Pathology Treatment    Patient Name:  Nicolás Khan   MRN:  [id number]  Admitting Diagnosis: Cerebral infarction due to embolism of left middle cerebral artery    Recommendations:                 General Recommendations:  Cognitive-linguistic therapy  Diet recommendations:  Regular, Liquid Diet Level: Thin   Aspiration Precautions: Small bites/sips and Standard aspiration precautions   General Precautions: Standard, fall  Communication strategies:  provide increased time to answer    Subjective     Awake/alert    Pain/Comfort:  Pain Rating 1: 0/10  Pain Rating Post-Intervention 1: 0/10    Respiratory Status: Room air    Objective:     Has the patient been evaluated by SLP for swallowing?   Yes  Keep patient NPO? No     Pt upright in bedside chair upon entry. She endorses ongoing difficulty with processing time and occasional word finding difficulty. Pt completed reasoning task 70% accy provided min cues and increased time to answer throughout. SLP provided ongoing education regarding cognitive tasks to increase progress (Solitaire) and word finding strategies. Continue POC at this time.     Assessment:     Nicolás Khan is a 72 y.o. female with an SLP diagnosis of Cognitive-Linguistic Impairment.      Goals:   Multidisciplinary Problems       SLP Goals          Problem: SLP    Goal Priority Disciplines Outcome   SLP Goal     SLP Ongoing, Progressing   Description: Speech Language Pathology Goals  Goals expected to be met by 1/7    1. Pt will participate in ongoing swallow assessment  2. Pt will complete higher level cognitive tasks with 80% accy                                Plan:     Patient to be seen:  3 x/week   Plan of Care reviewed with:  patient   SLP Follow-Up:  Yes       Discharge recommendations:  nursing facility, skilled     Time Tracking:     SLP Treatment Date:   01/06/23  Speech Start Time:  1017  Speech Stop Time:  1026     Speech Total Time (min):  9 min    Billable Minutes: Speech  Therapy Individual 9    01/06/2023

## 2023-01-06 NOTE — DISCHARGE SUMMARY
Hardeep Dow - Neurosurgery ([facility])  Vascular Neurology  Comprehensive Stroke Center  Discharge Summary     Summary:     Admit Date: 12/30/2022  3:22 PM    Discharge Date and Time:  01/06/2023 1:19 PM    Attending Physician: Mariaa Bower MD     Discharge Provider: Vanda Goins PA-C    History of Present Illness: Ms. Khan is a 71 yo F with PMH of HTN, DM, obesity, and noncompliance with medications. Patient was noted to have abnormal behavior around 1:30pm today. This was followed by chest pain, abdominal pain, and HA. Family called EMS, and patient was taken to [facility] ED. SBP on arrival 201. Patient was seen via Telestroke by Dr. Bower. NIHSS at that 5. CTH with hypodensity in L insular cortex. Not eligible for TNK. CTA H/N with L KEITH mid to distal A2 occlusion. Patient transferred to [facility] for possible intervention and higher level of care. On arrival patient with NIHSS of 8. Mild dysarthria and RLE weakness noted (new from WB exam). Of note patient received 0.5mg of Dilaudid at OSH prior to arrival.      Hospital Course (synopsis of major diagnoses, care, treatment, and services provided during the course of the hospital stay): 12/31/2022 Patient c/o R calf pain today, US r/o DVT. 1/2 dose of home valsartan was started this morning for SBP > 220 despite PRN pushes. Goal < 220, will continue to monitor. TTE pending. EKG obtained for evaluation of Qtc given higher dose of seroquel at home. Therapy recommendations for SNF. On DAPT.   01/01/2023 Patient c/o R calf pain. US negative for DVT. Will apply lidocaine patch to assist with pain management along with already PRN tylenol. TTE pending. Pending SNF. Other 1/2 of home valsartan started yesterday for SBP > 200 still. Today will continue to monitor and add home HCTZ if needed for persistent hypertension.   01/02/2023 NAEO, neuro exam stable. Continues to have mild R hemiparesis. BP meds titrated for better control, echo pending to complete stroke  workup.  2023 NAEO, neuro exam stable. BP improved following increased HCTZ and resuming amlodipine. Echo complete and results pending. Therapy re-eval for updated dispo recs today.  2023 NAEO, neuro exam unchanged. BP continues to improve on current regimen. Echo unremarkable. Pending dispo to SNF, remains medically ready.  2023 No acute events overnight. BP better controlled on current regimen. Pending dispo to SNF, CXR,COVID swab and PPD ordered for placement per SW. Patient continues to complain of joint pain in LLE. Remains medically ready for discharge.  2023 No acute events overnight. Patient remains medically ready for discharge to SNF. She will be discharged to SNF today. Cardiac monitor at time of discharge for prior embolic insular cortex infarct. Etiology for current CR infarct most likely . Patient to be referred to smoking cessation program. She will be discharged on DAPT and atorvastatin 80.       Goals of Care Treatment Preferences:  Code Status: Full Code      Stroke Etiology: Ischemic Small Vessel Disease (Lacunar)    STROKE DOCUMENTATION   Acute Stroke Times   Last Known Normal Date: 22  Last Known Normal Time:   Symptom Onset Date: 22  Symptom Onset Time:   Stroke Team Called Date: 22  Stroke Team Called Time:   Stroke Team Arrival Date: 22  Stroke Team Arrival Time:   CT Interpretation Time:   Thrombolytic Therapy Recommended: No  CTA Interpretation Time:  (Pending, patient lost access on initial attempt)  Thrombectomy Recommended: No  MRI Acute Stroke Protocol Interpretation Time:      NIH Scale:  1a. Level of Consciousness: 0-->Alert, keenly responsive  1b. LOC Questions: 0-->Answers both questions correctly  1c. LOC Commands: 0-->Performs both tasks correctly  2. Best Gaze: 0-->Normal  3. Visual: 0-->No visual loss  4. Facial Palsy: 0-->Normal symmetrical movements  5a. Motor Arm, Left: 0-->No drift, limb holds 90 (or 45)  degrees for full 10 secs  5b. Motor Arm, Right: 1-->Drift, limb holds 90 (or 45) degrees, but drifts down before full 10 secs, does not hit bed or other support  6a. Motor Leg, Left: 0-->No drift, leg holds 30 degree position for full 5 secs  6b. Motor Leg, Right: 0-->No drift, leg holds 30 degree position for full 5 secs  7. Limb Ataxia: 0-->Absent  8. Sensory: 0-->Normal, no sensory loss  9. Best Language: 0-->No aphasia, normal  10. Dysarthria: 0-->Normal  11. Extinction and Inattention (formerly Neglect): 0-->No abnormality  Total (NIH Stroke Scale): 1        Modified Ja Score: 4  Blanchard Coma Scale:    ABCD2 Score:    [id number] Score:   HAS -BLED Score:   ICH Score:   Hunt & Ulloa Classification:       Assessment/Plan:     Diagnostic Results:      Brain imaging:  MRI Brain WO Contrast 12/30/22  1. Small focus of acute/subacute lacunar infarction in the left corona radiata.  Recommend follow-up.  2. Involutional changes with chronic microvascular ischemic changes.  Remote left frontal cortical infarct.  3. Mild paranasal sinus disease.     CTH 12/30/22   Findings concerning for recent infarction involving the left frontal lobe and insular ribbon, as above. No intracranial hemorrhage. Sequela of mild senescent and chronic microvascular ischemic change with stable left caudate remote lacunar type infarct.        Vessel Imaging:  CTA H/N 12/30/22   1. Left anterior cerebral artery mid to distal A2 occlusion.  If clinical concern for acute infarction, further evaluation may be obtained with MRI.  2. CTA head and neck with no additional large vessel occlusion or dissection.        Cardiac Imaging   TTE  1/3/23   The left ventricle is normal in size with concentric remodeling and normal systolic function.   The estimated ejection fraction is 68%.   Mild left atrial enlargement.   Normal left ventricular diastolic function.   Normal right ventricular size with normal right ventricular systolic  function.   Normal central venous pressure (3 mmHg).   The estimated PA systolic pressure is 22 mmHg.    Interventions: None    Complications: None    Disposition: Skilled Nursing Facility    Final Active Diagnoses:    Diagnosis Date Noted POA    PRINCIPAL PROBLEM:  Cerebral infarction due to embolism of left middle cerebral artery [I63.412] 12/30/2022 Yes    Tobacco abuse [Z72.0] 12/30/2022 Yes    Essential hypertension [I10] 12/30/2022 Yes    Type 2 diabetes mellitus [E11.9] 12/30/2022 Yes    BMI 34.0-34.9,adult [Z68.34] 12/30/2022 Not Applicable    History of medication noncompliance [Z91.14] 12/30/2022 Not Applicable      Problems Resolved During this Admission:     No new Assessment & Plan notes have been filed under this hospital service since the last note was generated.  Service: Vascular Neurology      Recommendations:     Post-discharge complication risks: Falls    Stroke Education given to: patient    Follow-up in Stroke Clinic in 4-6 weeks.     Discharge Plan:  [id number] for 30d followed by ASA monotherapy  Atorvastatin 80 QHS  PCP f/u in 1 week  VN f/u in 4-6 weeks  Smoking cessation referral  Cardiac monitor for old insular cortex infarct that appears embolic  Counseled on the importance of medication compliance      Follow Up:   Follow-up Information     Ashlie Fernando MD Follow up in 1 week(s).    Specialty: Internal Medicine  Contact information:  [address] [place] [address]  [facility] [id number] [phone]                         Patient Instructions:      Ambulatory referral/consult to Vascular Neurology   Standing Status: Future   Referral Priority: Routine Referral Type: Consultation   Referral Reason: Specialty Services Required   Requested Specialty: Vascular Neurology   Number of Visits Requested: 1     Ambulatory referral/consult to Smoking Cessation Program   Standing Status: Future   Referral Priority: Routine Referral Type: Consultation   Referral Reason: Specialty  Services Required   Requested Specialty: CTTS   Number of Visits Requested: 1     Diet Cardiac   Order Comments: See Stroke Patient Education Guide Booklet for details.     Call 911 for any of the following:   Order Comments: Call 911  right away if any of the following warning signs come on suddenly, even if the symptoms only last for a few minutes. With stroke, timing is very important.   - Warning Signs of Stroke:  - Weakness: You may feel a sudden weakness, tingling or loss of feeling on one side of your face or body.  - Vision Problems: You may have sudden double vision or trouble seeing in one or both eyes.  - Speech Problems: You may have sudden trouble talking, slured speech, or problems understanding others.  - Headache: You may have sudden, severe headache.  - Movement Problems: You may experience dizziness, a feeling of spinning, a loss of balance, a feeling of falling or blackouts.     Cardiac event monitor   Standing Status: Future Standing Exp. Date: 01/06/24     Order Specific Question Answer Comments   Cardiac Event Monitor Auto Trigger    Release to patient Immediate        Medications:  Reconciled Home Medications:      Medication List      START taking these medications    amLODIPine 10 MG tablet  Commonly known as: NORVASC  Take 1 tablet (10 mg total) by mouth once daily.     atorvastatin 40 MG tablet  Commonly known as: LIPITOR  Take 2 tablets (80 mg total) by mouth every evening.  Replaces: atorvastatin 10 MG tablet     clopidogreL 75 mg tablet  Commonly known as: PLAVIX  Take 1 tablet (75 mg total) by mouth once daily.        CHANGE how you take these medications    QUEtiapine 150 mg Tab  Take 150 mg by mouth nightly.  What changed:   · medication strength  · how much to take     VENTOLIN HFA 90 mcg/actuation inhaler  Generic drug: albuterol  Inhale 2 puffs into the lungs every 6 (six) hours as needed for Wheezing. Rescue  What changed: Another medication with the same name was removed.  Continue taking this medication, and follow the directions you see here.        CONTINUE taking these medications    * aspirin 81 MG EC tablet  Commonly known as: ECOTRIN  Take 1 tablet (81 mg total) by mouth 2 (two) times daily.     * aspirin 81 MG EC tablet  Commonly known as: ECOTRIN  Take 81 mg by mouth once daily.     DITROPAN XL 10 MG 24 hr tablet  Generic drug: oxybutynin  Take 10 mg by mouth once daily.     ergocalciferol 50,000 unit Cap  Commonly known as: ERGOCALCIFEROL  Take 50,000 Units by mouth once a week.     famotidine 40 MG tablet  Commonly known as: PEPCID  Take 40 mg by mouth nightly.     hydroCHLOROthiazide 25 MG tablet  Commonly known as: HYDRODIURIL  Take 25 mg by mouth once daily.     levothyroxine 50 MCG tablet  Commonly known as: SYNTHROID  Take 50 mcg by mouth once daily.     metoclopramide HCl 10 MG tablet  Commonly known as: REGLAN  Take 10 mg by mouth 3 (three) times daily before meals.     PROzac 20 MG capsule  Generic drug: FLUoxetine  Take 20 mg by mouth once daily.     umeclidinium-vilanteroL 62.5-25 mcg/actuation Dsdv  Commonly known as: ANORO ELLIPTA  Inhale 1 puff into the lungs once daily. Controller     valsartan 160 MG tablet  Commonly known as: DIOVAN  Take 160 mg by mouth once daily.         * This list has 2 medication(s) that are the same as other medications prescribed for you. Read the directions carefully, and ask your doctor or other care provider to review them with you.            STOP taking these medications    amLODIPine-valsartan-hcthiazid -25 mg Tab     atorvastatin 10 MG tablet  Commonly known as: LIPITOR  Replaced by: atorvastatin 40 MG tablet     carvediloL 12.5 MG tablet  Commonly known as: COREG     ketorolac 10 mg tablet  Commonly known as: TORADOL     meloxicam 15 MG tablet  Commonly known as: DIGNA Goins PA-C  Comprehensive Stroke Center  Department of Vascular Neurology   [facility] Neurosurgery [facility]

## 2023-01-06 NOTE — PLAN OF CARE
At end of day on 1/5, GENNARO received call from N stating that they had sent the auth request to the MD and that it would likely not be approved till tomorrow (1/6).  She will start the auth to begin 1/6.      Fouzia Langston, MERY  [facility]  [phone]

## 2023-01-06 NOTE — PROGRESS NOTES
Hardeep Dow - Neurosurgery ([facility])  Vascular Neurology  Comprehensive Stroke Center  Progress Note    Assessment/Plan:     * Cerebral infarction due to embolism of left middle cerebral artery  73 yo F with PMHx of HTN, DM, obesity, and noncompliance with medications admitted with acute L MCA stroke. Presented to OSH with abnormal behavior followed by chest pain, abdominal pain, and HA.  on arrival. Telestroke with Dr. Bower, NIHSS 5. CTH with hypodensity in L insular cortex. Not eligible for TNK. CTA H/N with L KEITH mid to distal A2 occlusion. Patient transferred to [facility] for possible intervention and higher level of care. On arrival to [facility] NIHSS 8 initially,new RLE weakness and dysarthria from previous NIHSS 5 at OSH. Of note patient was given dilaudid prior to transfer. MRI ischemic protocol with L MCA infarct(CR) and L A2 occlusion, felt by IR to be chronic therefore not IR candidate. Echo with EF68%, mild LAE.   -Etiology at this time for current CR infarct is most likely . However, will discharge patient with order for cardiac monitor for prior embolic appearing insular cortex infarct and mild LAE seen on echo.  -No acute events overnight. Patient will be discharged to SNF today. Referral to smoking cessation program. She will be discharged on DAPT and atorvastatin 80.         Antithrombotics for secondary stroke prevention: Antiplatelets: Aspirin: 81 mg daily  Clopidogrel: 75 mg daily x 30 days then ASA monotherapy    Statins for secondary stroke prevention and hyperlipidemia, if present:   Statins: Atorvastatin- 80 mg daily, LDL goal <70 long term     Aggressive risk factor modification: HTN, DM, HLD, Diet, Exercise, Obesity     Rehab efforts: SNF     Diagnostics ordered/pending: None     VTE prophylaxis: Heparin 5000 units SQ every 8 hours  Mechanical prophylaxis: Place SCDs    BP parameters: SBP <180      History of medication noncompliance  Counseled on importance of medication  compliance       BMI 34.0-34.9,adult  Stroke Risk Factor    on diet and exercise when appropriate     Type 2 diabetes mellitus  Stroke Risk Factor    A1C 6   IP goal for glucose 140-180       Essential hypertension  Stroke Risk Factor   SBP <180  Continue Valsartan, HCTZ, and amlodipine  PRN labetalol/hydralazine    Tobacco abuse  Stroke RF  Counseled on cessation   Patient agreeable to referral for smoking cessation and states she does not feel like smoking anymore  Smoking cessation referral placed       12/31/2022 Patient c/o R calf pain today, US r/o DVT. 1/2 dose of home valsartan was started this morning for SBP > 220 despite PRN pushes. Goal < 220, will continue to monitor. TTE pending. EKG obtained for evaluation of Qtc given higher dose of seroquel at home. Therapy recommendations for SNF. On DAPT.   01/01/2023 Patient c/o R calf pain. US negative for DVT. Will apply lidocaine patch to assist with pain management along with already PRN tylenol. TTE pending. Pending SNF. Other 1/2 of home valsartan started yesterday for SBP > 200 still. Today will continue to monitor and add home HCTZ if needed for persistent hypertension.   01/02/2023 NAEO, neuro exam stable. Continues to have mild R hemiparesis. BP meds titrated for better control, echo pending to complete stroke workup.  01/03/2023 NAEO, neuro exam stable. BP improved following increased HCTZ and resuming amlodipine. Echo complete and results pending. Therapy re-eval for updated dispo recs today.  01/04/2023 NAEO, neuro exam unchanged. BP continues to improve on current regimen. Echo unremarkable. Pending dispo to SNF, remains medically ready.  1/5/2023 No acute events overnight. BP better controlled on current regimen. Pending dispo to SNF, CXR,COVID swab and PPD ordered for placement per SW. Patient continues to complain of joint pain in LLE. Remains medically ready for discharge.  1/6/2023 No acute events overnight. Patient remains medically  ready for discharge to SNF. She will be discharged to SNF today. Cardiac monitor at time of discharge for prior embolic insular cortex infarct. Etiology for current CR infarct most likely . Patient to be referred to smoking cessation program. She will be discharged on DAPT and atorvastatin 80.       STROKE DOCUMENTATION   Acute Stroke Times   Last Known Normal Date: 22  Last Known Normal Time:   Symptom Onset Date: 22  Symptom Onset Time:   Stroke Team Called Date: 22  Stroke Team Called Time:   Stroke Team Arrival Date: 22  Stroke Team Arrival Time:   CT Interpretation Time:   Thrombolytic Therapy Recommended: No  CTA Interpretation Time:  (Pending, patient lost access on initial attempt)  Thrombectomy Recommended: No  MRI Acute Stroke Protocol Interpretation Time:     NIH Scale:  1a. Level of Consciousness: 0-->Alert, keenly responsive  1b. LOC Questions: 0-->Answers both questions correctly  1c. LOC Commands: 0-->Performs both tasks correctly  2. Best Gaze: 0-->Normal  3. Visual: 0-->No visual loss  4. Facial Palsy: 0-->Normal symmetrical movements  5a. Motor Arm, Left: 0-->No drift, limb holds 90 (or 45) degrees for full 10 secs  5b. Motor Arm, Right: 1-->Drift, limb holds 90 (or 45) degrees, but drifts down before full 10 secs, does not hit bed or other support  6a. Motor Leg, Left: 0-->No drift, leg holds 30 degree position for full 5 secs  6b. Motor Leg, Right: 0-->No drift, leg holds 30 degree position for full 5 secs  7. Limb Ataxia: 0-->Absent  8. Sensory: 0-->Normal, no sensory loss  9. Best Language: 0-->No aphasia, normal  10. Dysarthria: 0-->Normal  11. Extinction and Inattention (formerly Neglect): 0-->No abnormality  Total (NIH Stroke Scale): 1       Modified [place] Score: 4  [place] Coma Scale:    ABCD2 Score:    [id number] Score:   HAS -BLED Score:   ICH Score:   Hunt & Ulloa Classification:      Hemorrhagic change of an Ischemic Stroke: Does this  patient have an ischemic stroke with hemorrhagic changes? No     Neurologic Chief Complaint: HA and L A2 occlusion     Subjective:     Interval History: Patient is seen for follow-up neurological assessment and treatment recommendations:     No acute events overnight. Patient remains medically ready for discharge to SNF. She will be discharged to SNF today. Cardiac monitor at time of discharge for prior embolic insular cortex infarct. Etiology for current CR infarct most likely . Patient to be referred to smoking cessation program. She will be discharged on DAPT and atorvastatin 80.     HPI, Past Medical, Family, and Social History remains the same as documented in the initial encounter.     Review of Systems   Constitutional:  Negative for fever.   HENT:  Negative for drooling.    Eyes:  Negative for visual disturbance.   Respiratory:  Negative for cough.    Gastrointestinal:  Negative for diarrhea and vomiting.   Genitourinary:  Negative for difficulty urinating.   Musculoskeletal:         +Joint pain   Neurological:  Positive for weakness (improving) and numbness (resolved). Negative for speech difficulty.   Psychiatric/Behavioral:  Negative for agitation, behavioral problems and confusion.    Scheduled Meds:   amLODIPine  10 mg Oral Daily    aspirin  81 mg Oral Daily    atorvastatin  40 mg Oral Daily    clopidogreL  75 mg Oral Daily    FLUoxetine  20 mg Oral Daily    heparin (porcine)  5,000 Units Subcutaneous Q8H    hydroCHLOROthiazide  25 mg Oral Daily    levothyroxine  50 mcg Oral Before breakfast    QUEtiapine  150 mg Oral Nightly    valsartan  160 mg Oral Daily     Continuous Infusions:   sodium chloride 0.9%       PRN Meds:acetaminophen, albuterol, labetaloL, melatonin, sodium chloride 0.9%, sodium chloride 0.9%    Objective:     Vital Signs (Most Recent):  Temp: 98.6 °F (37 °C) (23 1229)  Pulse: 80 (23 1229)  Resp: 18 (23 122)  BP: 133/62 (23 1229)  SpO2: 99 %  (01/06/23 1229)  BP Location: Left arm    Vital Signs Range (Last 24H):  Temp:  [97 °F (36.1 °C)-98.6 °F (37 °C)]   Pulse:  [74-91]   Resp:  [16-19]   BP: (126-197)/(61-93)   SpO2:  [96 %-99 %]   BP Location: Left arm    Physical Exam  Vitals and nursing note reviewed.   Constitutional:       Appearance: She is obese. She is not ill-appearing.   HENT:      Head: Normocephalic and atraumatic.      Right Ear: External ear normal.      Left Ear: External ear normal.      Nose: Nose normal.      Mouth/Throat:      Pharynx: Oropharynx is clear.   Eyes:      General: No scleral icterus.     Extraocular Movements: Extraocular movements intact.   Cardiovascular:      Rate and Rhythm: Normal rate.   Pulmonary:      Effort: Pulmonary effort is normal. No respiratory distress.   Abdominal:      General: There is no distension.   Musculoskeletal:         General: No tenderness.   Skin:     General: Skin is warm and dry.   Neurological:      Mental Status: She is alert and oriented to person, place, and time.      Cranial Nerves: No facial asymmetry.      Sensory: No sensory deficit.      Motor: Weakness (improving) present.   Psychiatric:         Mood and Affect: Mood normal.         Behavior: Behavior normal. Behavior is cooperative.       Neurological Exam:   LOC: alert  Attention Span: Good   Language: No aphasia  Articulation: No dysarthria  Orientation: Person, Place, Time   Visual Fields: Full  EOM (CN III, IV, VI): Full/intact  Facial Movement (CN VII): Symmetric facial expression    Motor: Arm left  Normal 5/5  Leg left  Normal 5/5  Arm right  Paresis: 4/5  Leg right Paresis: 4/5  Sensation: intact to light touch throughout    Laboratory:  CMP:   Recent Labs   Lab 01/06/23  0259   CALCIUM 9.9      K 4.2   CO2 24      BUN 23   CREATININE 0.8       BMP:   Recent Labs   Lab 01/06/23  0259      K 4.2      CO2 24   BUN 23   CREATININE 0.8   CALCIUM 9.9       CBC:   Recent Labs   Lab 01/05/23  1301    WBC 6.61   RBC 4.90   HGB 14.0   HCT 43.9      MCV 90   MCH 28.6   MCHC 31.9*       Lipid Panel:   Recent Labs   Lab 12/30/22  1603   CHOL 246*   LDLCALC 170.0*   HDL 59   TRIG 85       Coagulation:   Recent Labs   Lab 12/31/22  0523   INR 1.0   APTT 28.9       Hgb A1C:   Recent Labs   Lab 12/30/22  2304   HGBA1C 6.0*       TSH:   Recent Labs   Lab 12/30/22  1603   TSH 1.899         Diagnostic Results     Brain imaging:  MRI Brain WO Contrast 12/30/22  1. Small focus of acute/subacute lacunar infarction in the left corona radiata.  Recommend follow-up.  2. Involutional changes with chronic microvascular ischemic changes.  Remote left frontal cortical infarct.  3. Mild paranasal sinus disease.     CTH 12/30/22   Findings concerning for recent infarction involving the left frontal lobe and insular ribbon, as above. No intracranial hemorrhage. Sequela of mild senescent and chronic microvascular ischemic change with stable left caudate remote lacunar type infarct.       Vessel Imaging:  CTA H/N 12/30/22   1. Left anterior cerebral artery mid to distal A2 occlusion.  If clinical concern for acute infarction, further evaluation may be obtained with MRI.  2. CTA head and neck with no additional large vessel occlusion or dissection.      Cardiac Imaging   TTE  1/3/23   The left ventricle is normal in size with concentric remodeling and normal systolic function.   The estimated ejection fraction is 68%.   Mild left atrial enlargement.   Normal left ventricular diastolic function.   Normal right ventricular size with normal right ventricular systolic function.   Normal central venous pressure (3 mmHg).   The estimated PA systolic pressure is 22 mmHg.        Vanda Goins PA-C  Comprehensive Stroke Center  Department of Vascular Neurology   [facility] [place])

## 2023-01-23 ENCOUNTER — TELEPHONE (OUTPATIENT)
Dept: NEUROLOGY | Facility: HOSPITAL | Age: 73
End: 2023-01-23
Payer: MEDICARE

## 2023-01-23 NOTE — PROGRESS NOTES
Contacted patient to discuss Stroke Mobile Program, no answer @ contact numbers. VM message left requesting patient return call to SM team @ [phone].

## 2023-01-27 ENCOUNTER — CLINICAL SUPPORT (OUTPATIENT)
Dept: CARDIOLOGY | Facility: HOSPITAL | Age: 73
End: 2023-01-27
Attending: INTERNAL MEDICINE
Payer: MEDICARE

## 2023-01-27 DIAGNOSIS — I67.848 OTHER CEREBROVASCULAR VASOSPASM AND VASOCONSTRICTION: ICD-10-CM

## 2023-01-27 DIAGNOSIS — I63.9 STROKE: ICD-10-CM

## 2023-01-27 PROCEDURE — 93272 CARDIAC EVENT MONITOR (CUPID ONLY): ICD-10-PCS | Mod: ,,, | Performed by: STUDENT IN AN ORGANIZED HEALTH CARE EDUCATION/TRAINING PROGRAM

## 2023-01-27 PROCEDURE — 93272 ECG/REVIEW INTERPRET ONLY: CPT | Mod: ,,, | Performed by: STUDENT IN AN ORGANIZED HEALTH CARE EDUCATION/TRAINING PROGRAM

## 2023-01-27 PROCEDURE — 93270 REMOTE 30 DAY ECG REV/REPORT: CPT

## 2023-01-30 PROCEDURE — G0180 MD CERTIFICATION HHA PATIENT: HCPCS | Mod: ,,, | Performed by: INTERNAL MEDICINE

## 2023-01-30 PROCEDURE — G0180 PR HOME HEALTH MD CERTIFICATION: ICD-10-PCS | Mod: ,,, | Performed by: INTERNAL MEDICINE

## 2023-02-15 ENCOUNTER — TELEPHONE (OUTPATIENT)
Dept: SMOKING CESSATION | Facility: CLINIC | Age: 73
End: 2023-02-15
Payer: MEDICARE

## 2023-02-15 NOTE — TELEPHONE ENCOUNTER
Smoking Cessation counselor attempted to contact patient regarding no show appointment for intake visit, but patient did not answer.  Counselor left a message with rescheduling information.     Kylie Lau RRT,MSW,LMSW,TTS  [phone]

## 2023-02-23 ENCOUNTER — TELEPHONE (OUTPATIENT)
Dept: SMOKING CESSATION | Facility: CLINIC | Age: 73
End: 2023-02-23
Payer: MEDICARE

## 2023-02-23 NOTE — TELEPHONE ENCOUNTER
Smoking Cessation counselor attempted to contact patient regarding no show appointment for intake visit, but patient did not answer.  Counselor left a message with rescheduling information.      Kylie Lau RRT,MSW,LMSW,TTS  [phone]

## 2023-03-02 ENCOUNTER — EXTERNAL HOME HEALTH (OUTPATIENT)
Dept: HOME HEALTH SERVICES | Facility: HOSPITAL | Age: 73
End: 2023-03-02
Payer: MEDICARE

## 2023-05-10 ENCOUNTER — DOCUMENT SCAN (OUTPATIENT)
Dept: HOME HEALTH SERVICES | Facility: HOSPITAL | Age: 73
End: 2023-05-10
Payer: MEDICARE

## 2023-11-04 ENCOUNTER — HOSPITAL ENCOUNTER (EMERGENCY)
Facility: HOSPITAL | Age: 73
Discharge: HOME | End: 2023-11-04
Attending: STUDENT IN AN ORGANIZED HEALTH CARE EDUCATION/TRAINING PROGRAM
Payer: MEDICARE

## 2023-11-04 VITALS
TEMPERATURE: 98 F | HEART RATE: 74 BPM | WEIGHT: 230 LBS | RESPIRATION RATE: 17 BRPM | BODY MASS INDEX: 36.96 KG/M2 | DIASTOLIC BLOOD PRESSURE: 79 MMHG | SYSTOLIC BLOOD PRESSURE: 183 MMHG | OXYGEN SATURATION: 98 % | HEIGHT: 66 IN

## 2023-11-04 DIAGNOSIS — F10.920 ACUTE ALCOHOLIC INTOXICATION WITHOUT COMPLICATION: Primary | ICD-10-CM

## 2023-11-04 DIAGNOSIS — M25.551 ACUTE RIGHT HIP PAIN: ICD-10-CM

## 2023-11-04 DIAGNOSIS — R10.13 EPIGASTRIC PAIN: ICD-10-CM

## 2023-11-04 DIAGNOSIS — M19.90 ARTHRITIS: ICD-10-CM

## 2023-11-04 LAB
ALBUMIN SERPL BCP-MCNC: 3.5 G/DL (ref 3.5–5.2)
ALLENS TEST: ABNORMAL
ALP SERPL-CCNC: 86 U/L (ref 55–135)
ALT SERPL W/O P-5'-P-CCNC: 11 U/L (ref 10–44)
ANION GAP SERPL CALC-SCNC: 11 MMOL/L (ref 8–16)
AST SERPL-CCNC: 11 U/L (ref 10–40)
BASOPHILS # BLD AUTO: 0.04 K/UL (ref 0–0.2)
BASOPHILS NFR BLD: 0.5 % (ref 0–1.9)
BILIRUB SERPL-MCNC: 0.2 MG/DL (ref 0.1–1)
BILIRUB UR QL STRIP: NEGATIVE
BNP SERPL-MCNC: 22 PG/ML (ref 0–99)
BUN SERPL-MCNC: 11 MG/DL (ref 8–23)
CALCIUM SERPL-MCNC: 9.3 MG/DL (ref 8.7–10.5)
CHLORIDE SERPL-SCNC: 109 MMOL/L (ref 95–110)
CLARITY UR: CLEAR
CO2 SERPL-SCNC: 21 MMOL/L (ref 23–29)
COLOR UR: COLORLESS
CREAT SERPL-MCNC: 0.8 MG/DL (ref 0.5–1.4)
DELSYS: ABNORMAL
DIFFERENTIAL METHOD: ABNORMAL
EOSINOPHIL # BLD AUTO: 0.4 K/UL (ref 0–0.5)
EOSINOPHIL NFR BLD: 4.5 % (ref 0–8)
ERYTHROCYTE [DISTWIDTH] IN BLOOD BY AUTOMATED COUNT: 15.2 % (ref 11.5–14.5)
EST. GFR  (NO RACE VARIABLE): >60 ML/MIN/1.73 M^2
ETHANOL SERPL-MCNC: 251 MG/DL
FLOW: 1.5
GLUCOSE SERPL-MCNC: 133 MG/DL (ref 70–110)
GLUCOSE UR QL STRIP: NEGATIVE
HCO3 UR-SCNC: 26.5 MMOL/L (ref 24–28)
HCT VFR BLD AUTO: 37.6 % (ref 37–48.5)
HGB BLD-MCNC: 12.2 G/DL (ref 12–16)
HGB UR QL STRIP: NEGATIVE
IMM GRANULOCYTES # BLD AUTO: 0.02 K/UL (ref 0–0.04)
IMM GRANULOCYTES NFR BLD AUTO: 0.2 % (ref 0–0.5)
KETONES UR QL STRIP: NEGATIVE
LEUKOCYTE ESTERASE UR QL STRIP: NEGATIVE
LIPASE SERPL-CCNC: 40 U/L (ref 4–60)
LYMPHOCYTES # BLD AUTO: 3 K/UL (ref 1–4.8)
LYMPHOCYTES NFR BLD: 37.1 % (ref 18–48)
MAGNESIUM SERPL-MCNC: 1.8 MG/DL (ref 1.6–2.6)
MCH RBC QN AUTO: 28.6 PG (ref 27–31)
MCHC RBC AUTO-ENTMCNC: 32.4 G/DL (ref 32–36)
MCV RBC AUTO: 88 FL (ref 82–98)
MODE: ABNORMAL
MONOCYTES # BLD AUTO: 0.5 K/UL (ref 0.3–1)
MONOCYTES NFR BLD: 6 % (ref 4–15)
NEUTROPHILS # BLD AUTO: 4.2 K/UL (ref 1.8–7.7)
NEUTROPHILS NFR BLD: 51.7 % (ref 38–73)
NITRITE UR QL STRIP: NEGATIVE
NRBC BLD-RTO: 0 /100 WBC
PCO2 BLDA: 55.2 MMHG (ref 35–45)
PH SMN: 7.29 [PH] (ref 7.35–7.45)
PH UR STRIP: 5 [PH] (ref 5–8)
PLATELET # BLD AUTO: 248 K/UL (ref 150–450)
PMV BLD AUTO: 10.7 FL (ref 9.2–12.9)
PO2 BLDA: 47 MMHG (ref 40–60)
POC BE: -1 MMOL/L
POC SATURATED O2: 77 % (ref 95–100)
POC TCO2: 28 MMOL/L (ref 24–29)
POTASSIUM SERPL-SCNC: 3.4 MMOL/L (ref 3.5–5.1)
PROT SERPL-MCNC: 7.3 G/DL (ref 6–8.4)
PROT UR QL STRIP: NEGATIVE
RBC # BLD AUTO: 4.26 M/UL (ref 4–5.4)
SAMPLE: ABNORMAL
SITE: ABNORMAL
SODIUM SERPL-SCNC: 141 MMOL/L (ref 136–145)
SP GR UR STRIP: <1.005 (ref 1–1.03)
SP02: 93
TROPONIN I SERPL DL<=0.01 NG/ML-MCNC: 0.01 NG/ML (ref 0–0.03)
URN SPEC COLLECT METH UR: ABNORMAL
UROBILINOGEN UR STRIP-ACNC: NEGATIVE EU/DL
WBC # BLD AUTO: 8.2 K/UL (ref 3.9–12.7)

## 2023-11-04 PROCEDURE — 99900035 HC TECH TIME PER 15 MIN (STAT)

## 2023-11-04 PROCEDURE — 99285 EMERGENCY DEPT VISIT HI MDM: CPT | Mod: 25

## 2023-11-04 PROCEDURE — 80053 COMPREHEN METABOLIC PANEL: CPT | Performed by: STUDENT IN AN ORGANIZED HEALTH CARE EDUCATION/TRAINING PROGRAM

## 2023-11-04 PROCEDURE — 83735 ASSAY OF MAGNESIUM: CPT | Performed by: STUDENT IN AN ORGANIZED HEALTH CARE EDUCATION/TRAINING PROGRAM

## 2023-11-04 PROCEDURE — 85025 COMPLETE CBC W/AUTO DIFF WBC: CPT | Performed by: STUDENT IN AN ORGANIZED HEALTH CARE EDUCATION/TRAINING PROGRAM

## 2023-11-04 PROCEDURE — 84484 ASSAY OF TROPONIN QUANT: CPT | Performed by: STUDENT IN AN ORGANIZED HEALTH CARE EDUCATION/TRAINING PROGRAM

## 2023-11-04 PROCEDURE — 81003 URINALYSIS AUTO W/O SCOPE: CPT | Performed by: STUDENT IN AN ORGANIZED HEALTH CARE EDUCATION/TRAINING PROGRAM

## 2023-11-04 PROCEDURE — 63600175 PHARM REV CODE 636 W HCPCS: Performed by: EMERGENCY MEDICINE

## 2023-11-04 PROCEDURE — 82800 BLOOD PH: CPT

## 2023-11-04 PROCEDURE — 25000003 PHARM REV CODE 250: Performed by: EMERGENCY MEDICINE

## 2023-11-04 PROCEDURE — 25000003 PHARM REV CODE 250: Performed by: STUDENT IN AN ORGANIZED HEALTH CARE EDUCATION/TRAINING PROGRAM

## 2023-11-04 PROCEDURE — 83880 ASSAY OF NATRIURETIC PEPTIDE: CPT | Performed by: STUDENT IN AN ORGANIZED HEALTH CARE EDUCATION/TRAINING PROGRAM

## 2023-11-04 PROCEDURE — 83690 ASSAY OF LIPASE: CPT | Performed by: STUDENT IN AN ORGANIZED HEALTH CARE EDUCATION/TRAINING PROGRAM

## 2023-11-04 PROCEDURE — 82077 ASSAY SPEC XCP UR&BREATH IA: CPT | Performed by: STUDENT IN AN ORGANIZED HEALTH CARE EDUCATION/TRAINING PROGRAM

## 2023-11-04 PROCEDURE — 80048 BASIC METABOLIC PNL TOTAL CA: CPT | Mod: XB

## 2023-11-04 PROCEDURE — 96361 HYDRATE IV INFUSION ADD-ON: CPT

## 2023-11-04 PROCEDURE — 96374 THER/PROPH/DIAG INJ IV PUSH: CPT

## 2023-11-04 RX ORDER — LOSARTAN POTASSIUM 25 MG/1
50 TABLET ORAL DAILY
Status: DISCONTINUED | OUTPATIENT
Start: 2023-11-04 | End: 2023-11-04 | Stop reason: HOSPADM

## 2023-11-04 RX ORDER — CLONIDINE HYDROCHLORIDE 0.1 MG/1
0.1 TABLET ORAL
Status: COMPLETED | OUTPATIENT
Start: 2023-11-04 | End: 2023-11-04

## 2023-11-04 RX ORDER — AMLODIPINE BESYLATE 5 MG/1
10 TABLET ORAL
Status: COMPLETED | OUTPATIENT
Start: 2023-11-04 | End: 2023-11-04

## 2023-11-04 RX ORDER — HYDROCODONE BITARTRATE AND ACETAMINOPHEN 5; 325 MG/1; MG/1
1 TABLET ORAL
Status: COMPLETED | OUTPATIENT
Start: 2023-11-04 | End: 2023-11-04

## 2023-11-04 RX ORDER — HYDRALAZINE HYDROCHLORIDE 20 MG/ML
10 INJECTION INTRAMUSCULAR; INTRAVENOUS
Status: COMPLETED | OUTPATIENT
Start: 2023-11-04 | End: 2023-11-04

## 2023-11-04 RX ORDER — CLONIDINE HYDROCHLORIDE 0.1 MG/1
0.1 TABLET ORAL 2 TIMES DAILY
Qty: 60 TABLET | Refills: 11 | Status: SHIPPED | OUTPATIENT
Start: 2023-11-04 | End: 2024-11-03

## 2023-11-04 RX ORDER — HYDROCODONE BITARTRATE AND ACETAMINOPHEN 5; 325 MG/1; MG/1
1 TABLET ORAL EVERY 6 HOURS PRN
Qty: 12 TABLET | Refills: 0 | Status: SHIPPED | OUTPATIENT
Start: 2023-11-04

## 2023-11-04 RX ORDER — HYDRALAZINE HYDROCHLORIDE 20 MG/ML
10 INJECTION INTRAMUSCULAR; INTRAVENOUS
Status: DISCONTINUED | OUTPATIENT
Start: 2023-11-04 | End: 2023-11-04 | Stop reason: HOSPADM

## 2023-11-04 RX ADMIN — LOSARTAN POTASSIUM 50 MG: 25 TABLET, FILM COATED ORAL at 12:11

## 2023-11-04 RX ADMIN — AMLODIPINE BESYLATE 10 MG: 5 TABLET ORAL at 12:11

## 2023-11-04 RX ADMIN — HYDROCODONE BITARTRATE AND ACETAMINOPHEN 1 TABLET: 5; 325 TABLET ORAL at 12:11

## 2023-11-04 RX ADMIN — HYDRALAZINE HYDROCHLORIDE 10 MG: 20 INJECTION INTRAMUSCULAR; INTRAVENOUS at 01:11

## 2023-11-04 RX ADMIN — SODIUM CHLORIDE 1000 ML: 9 INJECTION, SOLUTION INTRAVENOUS at 03:11

## 2023-11-04 RX ADMIN — CLONIDINE HYDROCHLORIDE 0.1 MG: 0.1 TABLET ORAL at 12:11

## 2023-11-04 NOTE — ED NOTES
Report received from LENA Navas. Care taken over. Pt resting quietly on stretcher; chest rise and fall noted.  Pt remains on continuous cardiac and pulse ox monitoring. No acute distress or discomfort reported or observed.  Pt denies restroom needs at this time; purewick in place. Bed locked in lowest position; side rails up and locked x 2; call light, bedside table, and personal belongings within reach. Room assessed for safety measures and cleanliness; no action needed at this time. Unable to assess pt for suicide risk or med reconciliation at this time due to pt being very drowsy/sleepy, speech slurred, and pt being a poor historian at this time.

## 2023-11-04 NOTE — ED NOTES
Pt unable to remain awake despite repeated stimulation. Speech is slurred, oriented to self only. MD aware.

## 2023-11-04 NOTE — ED NOTES
Pt and family c/o right hip pain. Pt unable to give me a number on a scale of 0-10 due to pt falling alseep in the middle of conversation. MD Jose Alfredo informed and to order x-ray.

## 2023-11-04 NOTE — ED TRIAGE NOTES
Nicolás Khan is a 73 y.o female to ED by EMS for alcohol intoxication.  Family called EMS when pt remained on the couch in the same spot since 0900. Pt admits to drinking tonight.  Strong smell of ETOH present. Speech is slurred.

## 2023-11-04 NOTE — ED PROVIDER NOTES
Encounter Date: 11/4/2023       History     Chief Complaint   Patient presents with    Alcohol Intoxication    generalized pain     Family activated EMS stating the patient had been sitting in the same spot on the couch since 0900. Pt and family intoxicated. Pt states alcohol has nothing to do with it. Reports generalized pain all over. Ambulatory on scene with EMS     73 y.o. female who  has a past medical history of COPD (chronic obstructive pulmonary disease), Diabetes mellitus, GERD (gastroesophageal reflux disease), Hyperlipemia, Hypertension,  and Thyroid disease brought in by EMS after family called for patient having altered mental status.  Per EMS family endorse that she is been drinking multiple bottles of alcoholic beverages throughout the day and has been sitting on the couch without movement.  When EMS arrived to the scene patient endorsed having pain all over her body.   Currently patient reports having epigastric abdominal pain mild nausea.  No episodes of emesis.  She denies any chest pain or shortness of breath.  She does endorse drinking alcohol but does not believe her epigastric pain is secondary to alcohol use.  Denies any fevers or chills.    The history is provided by the patient and the EMS personnel.     Review of patient's allergies indicates:  No Known Allergies  Past Medical History:   Diagnosis Date    COPD (chronic obstructive pulmonary disease)     Diabetes mellitus     GERD (gastroesophageal reflux disease)     Hyperlipemia, mixed     Hypertension     Major depression     Obesity     Osteoarthritis     Thyroid disease     Tobacco abuse     Urinary incontinence, functional     Vitamin D deficiency      Past Surgical History:   Procedure Laterality Date    HYSTERECTOMY       History reviewed. No pertinent family history.  Social History     Tobacco Use    Smoking status: Every Day     Current packs/day: 1.00     Types: Cigarettes    Smokeless tobacco: Never   Substance Use Topics     Alcohol use: Yes     Review of Systems   Constitutional:  Negative for chills and fever.   HENT:  Negative for congestion and rhinorrhea.    Eyes:  Negative for pain.   Respiratory:  Negative for cough and shortness of breath.    Cardiovascular:  Negative for chest pain and leg swelling.   Gastrointestinal:  Positive for abdominal pain and nausea. Negative for vomiting.   Endocrine: Negative for polyuria.   Genitourinary:  Negative for dysuria and hematuria.   Musculoskeletal:  Negative for gait problem and neck pain.   Skin:  Negative for rash.   Allergic/Immunologic: Negative for immunocompromised state.   Neurological:  Negative for weakness and headaches.       Physical Exam     Initial Vitals [11/04/23 0222]   BP Pulse Resp Temp SpO2   (!) 146/77 74 18 98.2 °F (36.8 °C) 96 %      MAP       --         Physical Exam    Nursing note and vitals reviewed.  Constitutional: She is not diaphoretic. No distress.   HENT:   Head: Normocephalic and atraumatic.   Eyes: Conjunctivae and EOM are normal. Pupils are equal, round, and reactive to light.   Neck:   Normal range of motion.  Cardiovascular:  Regular rhythm.           Pulmonary/Chest: Breath sounds normal. No respiratory distress.   Abdominal: Abdomen is soft. Bowel sounds are normal. She exhibits no distension. There is abdominal tenderness. There is no rebound and no guarding.   Musculoskeletal:         General: No tenderness. Normal range of motion.      Cervical back: Normal range of motion.     Lymphadenopathy:     She has no cervical adenopathy.   Neurological: She is alert and oriented to person, place, and time.   Skin: Skin is warm and dry. Capillary refill takes less than 2 seconds.         ED Course   Procedures  Labs Reviewed   CBC W/ AUTO DIFFERENTIAL - Abnormal; Notable for the following components:       Result Value    RDW 15.2 (*)     All other components within normal limits   COMPREHENSIVE METABOLIC PANEL - Abnormal; Notable for the following  components:    Potassium 3.4 (*)     CO2 21 (*)     Glucose 133 (*)     All other components within normal limits   URINALYSIS, REFLEX TO URINE CULTURE - Abnormal; Notable for the following components:    Color, UA Colorless (*)     Specific Gravity, UA <1.005 (*)     All other components within normal limits    Narrative:     Specimen Source->Urine   ALCOHOL,MEDICAL (ETHANOL) - Abnormal; Notable for the following components:    Alcohol, Serum 251 (*)     All other components within normal limits   ISTAT PROCEDURE - Abnormal; Notable for the following components:    POC PH 7.289 (*)     POC PCO2 55.2 (*)     All other components within normal limits   MAGNESIUM   TROPONIN I   B-TYPE NATRIURETIC PEPTIDE   LIPASE          Imaging Results              CT Hip Without Contrast Right (Final result)  Result time 11/04/23 12:56:26      Final result by Mango Santos MD (11/04/23 12:56:26)                   Impression:      No displaced fracture-dislocation identified.      Electronically signed by: Mango Santos MD  Date:    11/04/2023  Time:    12:56               Narrative:    EXAMINATION:  CT HIP WITHOUT CONTRAST RIGHT    CLINICAL HISTORY:  Hip pain, stress fracture suspected, neg xray;    TECHNIQUE:  Axial images were obtained through the right hip without IV contrast.  Coronal in sagittal reformatted images were generated.    COMPARISON:  Right hip series earlier same day    FINDINGS:  No displaced fracture, dislocation or destructive osseous process.  Overall alignment is within normal limits.  Age-related mild degenerative change at the partially imaged lower lumbar spine, pubic symphysis and right sacroiliac and femoroacetabular joints.  Mild fatty atrophy of the partially imaged lower right paraspinal and also right hip/girdle muscles.  No subcutaneous emphysema or radiodense retained foreign body.    Scattered atherosclerotic vascular calcifications noted.  No significant abnormality seen within the partially  imaged right lower quadrant and pelvis.                                       X-Ray Hip 2 or 3 views Right (with Pelvis when performed) (Final result)  Result time 11/04/23 09:13:59      Final result by Earline Hill MD (11/04/23 09:13:59)                   Impression:      Somewhat slightly limited study, large catheter traverses the femoral neck and intertrochanteric region, no definite fracture identified.  If symptoms persist consider CT examination.      Electronically signed by: Earline Hill MD  Date:    11/04/2023  Time:    09:13               Narrative:    EXAMINATION:  XR HIP WITH PELVIS WHEN PERFORMED, 2 OR 3  VIEWS RIGHT    CLINICAL HISTORY:  Pain in right hip    TECHNIQUE:  AP view of the pelvis and frog leg lateral view of the right hip were performed.    COMPARISON:  None    FINDINGS:  Limited study, large catheter overlie the patient's femoral neck.    The right hip joint space appears mildly narrowed    The right femoral head contour is normal.    No definite fracture, no osseous lesions.    The acetabular region appears normal, no sclerosis or osteophyte formation.    The remainder of the visualized osseous structures and soft tissues appear within normal limits.                                       X-Ray Chest AP Portable (Final result)  Result time 11/04/23 03:53:28      Final result by Don Prado MD (11/04/23 03:53:28)                   Impression:      Radiographic findings as above.      Electronically signed by: Don Prado  Date:    11/04/2023  Time:    03:53               Narrative:    EXAMINATION:  XR CHEST AP PORTABLE    CLINICAL HISTORY:  Epigastric pain    TECHNIQUE:  Single frontal view of the chest was performed.    COMPARISON:  Chest radiograph January 5, 2023    FINDINGS:  Single portable chest view is submitted.  There is mild diminished depth of inspiration and there is rotation, the heart size appears enlarged, when accounting for position and  technique and depth of inspiration the appearance of the cardiac size and cardiomediastinal silhouette is thought stable.    Accentuation of the pulmonary vasculature is noted and there is bilateral pattern of pulmonary opacities consistent with pulmonary infiltrates, this pattern may relate to pulmonary vascular congestion and edema for which clinical correlation is needed.    There is no evidence for significant pleural effusion or pneumothorax.    Small densities associated with the right shoulder region are noted, seen on the prior study as well, may relate to remote injury.  The osseous structures demonstrate chronic change.                                       Medications   sodium chloride 0.9% bolus 1,000 mL 1,000 mL (0 mLs Intravenous Stopped 11/4/23 0437)   HYDROcodone-acetaminophen 5-325 mg per tablet 1 tablet (1 tablet Oral Given 11/4/23 1257)   cloNIDine tablet 0.1 mg (0.1 mg Oral Given 11/4/23 1257)   hydrALAZINE injection 10 mg (10 mg Intravenous Given 11/4/23 1325)   amLODIPine tablet 10 mg (10 mg Oral Given 11/4/23 1257)     Medical Decision Making:   History:   Old Medical Records: I decided to obtain old medical records.  Initial Assessment:   73 y.o. female who  has a past medical history of COPD (chronic obstructive pulmonary disease), Diabetes mellitus, GERD (gastroesophageal reflux disease), Hyperlipemia, Hypertension,  and Thyroid disease brought in by EMS after family called for patient having altered mental status.  Patient in no acute distress, alert oriented x3, no signs of head trauma.  Suspect patient's altered mental status secondary to alcohol use.  Will obtain lab work to assess for possible systemic infection or electrolyte derangements.  Will observe patient in the emergency department until clinical sobriety.  Differential Diagnosis:   Differential Diagnosis includes, but is not limited to:  substance abuse, alcohol intoxication/withdrawal, medication reaction, metabolic  disturbance, hypoglycemia.    Clinical Tests:   Lab Tests: Ordered and Reviewed  Radiological Study: Ordered and Reviewed  Medical Tests: Ordered and Reviewed               ED Course as of 11/04/23 1705   Sat Nov 04, 2023   0342 CBC auto differential(!) [AS]   0349 Alcohol, Serum(!): 251 [AS]   0414 Comprehensive metabolic panel(!) [AS]   0415 ISTAT PROCEDURE(!!) [AS]   0415 Brain natriuretic peptide [AS]   0415 Troponin I [AS]   0415 Lipase [AS]   0547 Patient continues to be unsteady on her feet. [AS]   0600 Patient care signed out to oncoming physician pending clinical sobriety.  Continue to suspect that patient's altered mental status secondary to alcohol intoxication.  Patient to be discharged once clinically sober. [AS]      ED Course User Index  [AS] Paris Mesa MD          Medical Decision Making  Amount and/or Complexity of Data Reviewed  Labs: ordered. Decision-making details documented in ED Course.  Radiology: ordered.    Risk  Prescription drug management.           Clinical Impression:   Final diagnoses:  [R10.13] Epigastric pain  [M25.551] Acute right hip pain  [F10.920] Acute alcoholic intoxication without complication (Primary)  [M19.90] Arthritis          ED Disposition Condition    Discharge Stable          ED Prescriptions       Medication Sig Dispense Start Date End Date Auth. Provider    HYDROcodone-acetaminophen (NORCO) 5-325 mg per tablet Take 1 tablet by mouth every 6 (six) hours as needed for Pain. 12 tablet 11/4/2023 -- Luis Alberto Veliz MD    cloNIDine (CATAPRES) 0.1 MG tablet Take 1 tablet (0.1 mg total) by mouth 2 (two) times daily. 60 tablet 11/4/2023 11/3/2024 Luis Alberto Veliz MD          Follow-up Information       Follow up With Specialties Details Why Contact Info    Ashlie Allison MD Internal Medicine In 3 days  [address]  [facility] [address]  [phone]              DISCLAIMER: This note was prepared with MModal voice recognition transcription  software. Garbled syntax, mangled pronouns, and other bizarre constructions may be attributed to that software system.     Paris Mesa MD  11/04/23 6210

## 2023-11-04 NOTE — ED PROVIDER NOTES
Encounter Date: 11/4/2023       History     Chief Complaint   Patient presents with    Alcohol Intoxication    generalized pain     Family activated EMS stating the patient had been sitting in the same spot on the couch since 0900. Pt and family intoxicated. Pt states alcohol has nothing to do with it. Reports generalized pain all over. Ambulatory on scene with EMS     73 y.o. female who  has a past medical history of COPD (chronic obstructive pulmonary disease), Diabetes mellitus, GERD (gastroesophageal reflux disease), Hyperlipemia, Hypertension,  and Thyroid disease brought in by EMS after family called for patient having altered mental status.  Per EMS family endorse that she is been drinking multiple bottles of alcoholic beverages throughout the day and has been sitting on the couch without movement.  When EMS arrived to the scene patient endorsed having pain all over her body.   Currently patient reports having epigastric abdominal pain mild nausea.  No episodes of emesis.  She denies any chest pain or shortness of breath.  She does endorse drinking alcohol but does not believe her epigastric pain is secondary to alcohol use.  Denies any fevers or chills.      Review of patient's allergies indicates:  No Known Allergies  Past Medical History:   Diagnosis Date    COPD (chronic obstructive pulmonary disease)     Diabetes mellitus     GERD (gastroesophageal reflux disease)     Hyperlipemia, mixed     Hypertension     Major depression     Obesity     Osteoarthritis     Thyroid disease     Tobacco abuse     Urinary incontinence, functional     Vitamin D deficiency      Past Surgical History:   Procedure Laterality Date    HYSTERECTOMY       History reviewed. No pertinent family history.  Social History     Tobacco Use    Smoking status: Every Day     Current packs/day: 1.00     Types: Cigarettes    Smokeless tobacco: Never   Substance Use Topics    Alcohol use: Yes     Review of Systems   Constitutional:  Negative  for chills and fever.   HENT:  Negative for congestion and rhinorrhea.    Eyes:  Negative for pain.   Respiratory:  Negative for cough and shortness of breath.    Cardiovascular:  Negative for chest pain and leg swelling.   Gastrointestinal:  Positive for abdominal pain and nausea. Negative for vomiting.   Endocrine: Negative for polyuria.   Genitourinary:  Negative for dysuria and hematuria.   Musculoskeletal:  Negative for gait problem and neck pain.   Skin:  Negative for rash.   Allergic/Immunologic: Negative for immunocompromised state.   Neurological:  Negative for weakness and headaches.       Physical Exam     Initial Vitals [11/04/23 0222]   BP Pulse Resp Temp SpO2   (!) 146/77 74 18 98.2 °F (36.8 °C) 96 %      MAP       --         Physical Exam    Nursing note and vitals reviewed.  Constitutional: She is not diaphoretic. No distress.   HENT:   Head: Normocephalic and atraumatic.   Eyes: Conjunctivae and EOM are normal. Pupils are equal, round, and reactive to light.   Neck:   Normal range of motion.  Cardiovascular:  Regular rhythm.           Pulmonary/Chest: Breath sounds normal. No respiratory distress.   Abdominal: Abdomen is soft. Bowel sounds are normal. She exhibits no distension. There is abdominal tenderness. There is no rebound and no guarding.   Musculoskeletal:         General: No tenderness. Normal range of motion.      Cervical back: Normal range of motion.     Lymphadenopathy:     She has no cervical adenopathy.   Neurological: She is alert and oriented to person, place, and time.   Skin: Skin is warm and dry. Capillary refill takes less than 2 seconds.         ED Course   Procedures  Labs Reviewed   CBC W/ AUTO DIFFERENTIAL - Abnormal; Notable for the following components:       Result Value    RDW 15.2 (*)     All other components within normal limits   COMPREHENSIVE METABOLIC PANEL - Abnormal; Notable for the following components:    Potassium 3.4 (*)     CO2 21 (*)     Glucose 133 (*)      All other components within normal limits   URINALYSIS, REFLEX TO URINE CULTURE - Abnormal; Notable for the following components:    Color, UA Colorless (*)     Specific Gravity, UA <1.005 (*)     All other components within normal limits    Narrative:     Specimen Source->Urine   ALCOHOL,MEDICAL (ETHANOL) - Abnormal; Notable for the following components:    Alcohol, Serum 251 (*)     All other components within normal limits   ISTAT PROCEDURE - Abnormal; Notable for the following components:    POC PH 7.289 (*)     POC PCO2 55.2 (*)     All other components within normal limits   MAGNESIUM   TROPONIN I   B-TYPE NATRIURETIC PEPTIDE   LIPASE          Imaging Results              CT Hip Without Contrast Right (Final result)  Result time 11/04/23 12:56:26      Final result by Mango Santos MD (11/04/23 12:56:26)                   Impression:      No displaced fracture-dislocation identified.      Electronically signed by: Mango Santos MD  Date:    11/04/2023  Time:    12:56               Narrative:    EXAMINATION:  CT HIP WITHOUT CONTRAST RIGHT    CLINICAL HISTORY:  Hip pain, stress fracture suspected, neg xray;    TECHNIQUE:  Axial images were obtained through the right hip without IV contrast.  Coronal in sagittal reformatted images were generated.    COMPARISON:  Right hip series earlier same day    FINDINGS:  No displaced fracture, dislocation or destructive osseous process.  Overall alignment is within normal limits.  Age-related mild degenerative change at the partially imaged lower lumbar spine, pubic symphysis and right sacroiliac and femoroacetabular joints.  Mild fatty atrophy of the partially imaged lower right paraspinal and also right hip/girdle muscles.  No subcutaneous emphysema or radiodense retained foreign body.    Scattered atherosclerotic vascular calcifications noted.  No significant abnormality seen within the partially imaged right lower quadrant and pelvis.                                        X-Ray Hip 2 or 3 views Right (with Pelvis when performed) (Final result)  Result time 11/04/23 09:13:59      Final result by Earline Hill MD (11/04/23 09:13:59)                   Impression:      Somewhat slightly limited study, large catheter traverses the femoral neck and intertrochanteric region, no definite fracture identified.  If symptoms persist consider CT examination.      Electronically signed by: Earline Hill MD  Date:    11/04/2023  Time:    09:13               Narrative:    EXAMINATION:  XR HIP WITH PELVIS WHEN PERFORMED, 2 OR 3  VIEWS RIGHT    CLINICAL HISTORY:  Pain in right hip    TECHNIQUE:  AP view of the pelvis and frog leg lateral view of the right hip were performed.    COMPARISON:  None    FINDINGS:  Limited study, large catheter overlie the patient's femoral neck.    The right hip joint space appears mildly narrowed    The right femoral head contour is normal.    No definite fracture, no osseous lesions.    The acetabular region appears normal, no sclerosis or osteophyte formation.    The remainder of the visualized osseous structures and soft tissues appear within normal limits.                                       X-Ray Chest AP Portable (Final result)  Result time 11/04/23 03:53:28      Final result by Don Prado MD (11/04/23 03:53:28)                   Impression:      Radiographic findings as above.      Electronically signed by: Don Prado  Date:    11/04/2023  Time:    03:53               Narrative:    EXAMINATION:  XR CHEST AP PORTABLE    CLINICAL HISTORY:  Epigastric pain    TECHNIQUE:  Single frontal view of the chest was performed.    COMPARISON:  Chest radiograph January 5, 2023    FINDINGS:  Single portable chest view is submitted.  There is mild diminished depth of inspiration and there is rotation, the heart size appears enlarged, when accounting for position and technique and depth of inspiration the appearance of the cardiac size and  cardiomediastinal silhouette is thought stable.    Accentuation of the pulmonary vasculature is noted and there is bilateral pattern of pulmonary opacities consistent with pulmonary infiltrates, this pattern may relate to pulmonary vascular congestion and edema for which clinical correlation is needed.    There is no evidence for significant pleural effusion or pneumothorax.    Small densities associated with the right shoulder region are noted, seen on the prior study as well, may relate to remote injury.  The osseous structures demonstrate chronic change.                                       Medications   sodium chloride 0.9% bolus 1,000 mL 1,000 mL (0 mLs Intravenous Stopped 11/4/23 0437)   HYDROcodone-acetaminophen 5-325 mg per tablet 1 tablet (1 tablet Oral Given 11/4/23 1257)   cloNIDine tablet 0.1 mg (0.1 mg Oral Given 11/4/23 1257)   hydrALAZINE injection 10 mg (10 mg Intravenous Given 11/4/23 1325)   amLODIPine tablet 10 mg (10 mg Oral Given 11/4/23 1257)     Medical Decision Making:   History:   Old Medical Records: I decided to obtain old medical records.  Initial Assessment:   73 y.o. female who  has a past medical history of COPD (chronic obstructive pulmonary disease), Diabetes mellitus, GERD (gastroesophageal reflux disease), Hyperlipemia, Hypertension,  and Thyroid disease brought in by EMS after family called for patient having altered mental status.  Patient in no acute distress, alert oriented x3, no signs of head trauma.  Suspect patient's altered mental status secondary to alcohol use.  Will obtain lab work to assess for possible systemic infection or electrolyte derangements.  Will observe patient in the emergency department until clinical sobriety.  Differential Diagnosis:   Differential Diagnosis includes, but is not limited to:  substance abuse, alcohol intoxication/withdrawal, medication reaction, metabolic disturbance, hypoglycemia.    Clinical Tests:   Lab Tests: Ordered and  Reviewed  Radiological Study: Ordered and Reviewed  Medical Tests: Ordered and Reviewed               ED Course as of 11/05/23 1546   Sat Nov 04, 2023   0342 CBC auto differential(!) [AS]   0349 Alcohol, Serum(!): 251 [AS]   0414 Comprehensive metabolic panel(!) [AS]   0415 ISTAT PROCEDURE(!!) [AS]   0415 Brain natriuretic peptide [AS]   0415 Troponin I [AS]   0415 Lipase [AS]   0547 Patient continues to be unsteady on her feet. [AS]   0600 Patient care signed out to oncoming physician pending clinical sobriety.  Continue to suspect that patient's altered mental status secondary to alcohol intoxication.  Patient to be discharged once clinically sober. [AS]      ED Course User Index  [AS] Paris Mesa MD          Medical Decision Making  Amount and/or Complexity of Data Reviewed  Labs: ordered. Decision-making details documented in ED Course.  Radiology: ordered.    Risk  Prescription drug management.    This is doctor Jose Alfredo dictating.  I assumed care this patient at 6:00 a.m..  The patient is awake and alert at 1:00 p.m..  She complains of pain in the right hip.  X-rays were equivocal.  CT of the right hip fails to reveal fracture or other significant abnormalities.  There is some pain with range of motion.  There is no pain with micro range of motion.  I carefully considered septic joint.  I feel it is unlikely.  The patient had onset of her pain on Wednesday.  She is not febrile ill or toxic.  Her blood pressure is high.  I will treat her for hypertension in the emergency room.  I will discharge her on your usual medicines.  I will treat her for pain.  I will advise moderation in her use of alcohol.  There is no leukocytosis to suggest infection there is no significant anemia there are no electrolyte abnormalities except for a borderline low potassium at 3.4 which is not significant.  The CO2 is slightly low at 21.  This is not likely significant.  The glucose is little high at 133.  There is no evidence of  hepatic or renal failure.  There is no elevation in the BNP or troponin.  The patient's alcohol is 251.  We have been watching the patient for sobriety.  The patient is alert and conversational at 1:00 p.m..  CT of the right hip is negative for fracture.  I will discharge this patient to outpatient evaluation and treatment.  The patient's urinalysis is clean.  The patient was little acidotic on arrival.  I think this is likely from CO2 retention and alcohol intoxication.  Chest x-ray fails to reveal significant abnormalities.         Clinical Impression:   Final diagnoses:  [R10.13] Epigastric pain  [M25.551] Acute right hip pain  [F10.920] Acute alcoholic intoxication without complication (Primary)  [M19.90] Arthritis        ED Disposition Condition    Discharge Stable          ED Prescriptions       Medication Sig Dispense Start Date End Date Auth. Provider    HYDROcodone-acetaminophen (NORCO) 5-325 mg per tablet Take 1 tablet by mouth every 6 (six) hours as needed for Pain. 12 tablet 11/4/2023 -- Luis Alberto Veliz MD    cloNIDine (CATAPRES) 0.1 MG tablet Take 1 tablet (0.1 mg total) by mouth 2 (two) times daily. 60 tablet 11/4/2023 11/3/2024 Luis Alberto Veliz MD          Follow-up Information       Follow up With Specialties Details Why Contact Info    Ashlie Allison MD Internal Medicine In 3 days  [address]  [facility] [address]  [phone]              DISCLAIMER: This note was prepared with [employer] voice recognition transcription software. Garbled syntax, mangled pronouns, and other bizarre constructions may be attributed to that software system.     Luis Alberto Veliz MD  11/04/23 1325       Luis Alberto Veliz MD  11/05/23 2251

## 2023-11-04 NOTE — DISCHARGE INSTRUCTIONS
Tylenol with hydrocodone for pain.  Please moderate your use of alcohol.  Please return immediately if you get worse or if new problems develop.  Use your walker.  Rest.  Please take your blood pressure medicines exactly as they have been written.  Please follow-up with your primary care doctor this week.  Rest.  Please use a heating pad.

## 2023-12-11 ENCOUNTER — HOSPITAL ENCOUNTER (EMERGENCY)
Facility: HOSPITAL | Age: 73
Discharge: HOME | End: 2023-12-11
Attending: EMERGENCY MEDICINE
Payer: MEDICARE

## 2023-12-11 VITALS
TEMPERATURE: 97 F | WEIGHT: 226 LBS | DIASTOLIC BLOOD PRESSURE: 74 MMHG | BODY MASS INDEX: 38.58 KG/M2 | OXYGEN SATURATION: 97 % | SYSTOLIC BLOOD PRESSURE: 160 MMHG | RESPIRATION RATE: 17 BRPM | HEIGHT: 64 IN | HEART RATE: 93 BPM

## 2023-12-11 DIAGNOSIS — M25.539 WRIST PAIN: ICD-10-CM
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
## 2023-12-11 DIAGNOSIS — S52.502A CLOSED FRACTURE OF DISTAL END OF LEFT RADIUS, UNSPECIFIED FRACTURE MORPHOLOGY, INITIAL ENCOUNTER: Primary | ICD-10-CM

## 2023-12-11 PROCEDURE — 25000003 PHARM REV CODE 250

## 2023-12-11 PROCEDURE — 29105 APPLICATION LONG ARM SPLINT: CPT | Mod: LT

## 2023-12-11 PROCEDURE — 99284 EMERGENCY DEPT VISIT MOD MDM: CPT | Mod: 25

## 2023-12-11 RX ORDER — HYDROCODONE BITARTRATE AND ACETAMINOPHEN 5; 325 MG/1; MG/1
1 TABLET ORAL
Status: COMPLETED | OUTPATIENT
Start: 2023-12-11 | End: 2023-12-11

## 2023-12-11 RX ORDER — ACETAMINOPHEN 325 MG/1
650 TABLET ORAL
Status: DISCONTINUED | OUTPATIENT
Start: 2023-12-11 | End: 2023-12-11

## 2023-12-11 RX ORDER — LIDOCAINE 50 MG/G
1 PATCH TOPICAL DAILY
Qty: 15 PATCH | Refills: 0 | Status: SHIPPED | OUTPATIENT
Start: 2023-12-11

## 2023-12-11 RX ORDER — HYDROCODONE BITARTRATE AND ACETAMINOPHEN 5; 325 MG/1; MG/1
1 TABLET ORAL EVERY 6 HOURS PRN
Qty: 18 TABLET | Refills: 0 | Status: SHIPPED | OUTPATIENT
Start: 2023-12-11 | End: 2023-12-18

## 2023-12-11 RX ADMIN — HYDROCODONE BITARTRATE AND ACETAMINOPHEN 1 TABLET: 5; 325 TABLET ORAL at 02:12

## 2023-12-11 NOTE — DISCHARGE INSTRUCTIONS
Thank you for coming to our Emergency Department today. It is important to remember that some problems or medical conditions are difficult to diagnose and may not be found or addressed during your Emergency Department visit.  These conditions often start with non-specific symptoms and can only be diagnosed on follow up visits with your primary care physician or specialist when the symptoms continue or change. Please remember that all medical conditions can change, and we cannot predict how you will be feeling tomorrow or the next day. Return to the ER with any questions/concerns, new/concerning symptoms, worsening or failure to improve.   Be sure to follow up with your primary care doctor and review all labs/imaging/tests that were performed during your ER visit with them. It is very common for us to identify non-emergent incidental findings which must be followed up with your primary care physician.  Some labs/imaging/tests may be outside of the normal range, and require non-emergent follow-up and/or further investigation/treatment/procedures/testing to help diagnose/exclude/prevent complications or other potentially serious medical conditions. Some abnormalities may not have been discussed or addressed during your ER visit. Some lab results may not return during your ER visit but can be accessible by downloading the free [facility] Mychart star or by visiting [email or web address] . It is important for you to review all labs/imaging/tests which are outside of the normal range with your physician.  An ER visit does not replace a primary care visit, and many screening tests or follow-up tests cannot be ordered by an ER doctor or performed by the ER. Some tests may even require pre-approval.  If you do not have a primary care doctor, you may contact the one listed on your discharge paperwork or you may also call the [facility] Appointment Desk at [phone] , or [address] [place] [address] at  [phone] to schedule an  appointment, or establish care with a primary care doctor or even a specialist and to obtain information about local resources. It is important to your health that you have a primary care doctor.  Please take all medications as directed. We have done our best to select a medication for you that will treat your condition however, all medications may potentially have side-effects and it is impossible to predict which medications may give you side-effects or what those side-effects (if any) those medications may give you.  If you feel that you are having a negative effect or side-effect of any medication you should stop taking those medications immediately and seek medical attention. If you feel that you are having a life-threatening reaction call 911.  Do not drive, swim, climb to height, take a bath, operate heavy machinery, drink alcohol or take potentially sedating medications, sign any legal documents or make any important decisions for 24 hours if you have received any pain medications, sedatives or mood altering drugs during your ER visit or within 24 hours of taking them if they have been prescribed to you.   You can find additional resources for Dentists, hearing aids, durable medical equipment, low cost pharmacies and other resources at [email or web address]  Patient agrees with this plan. Discussed with her strict return precautions, she verbalized understanding. Patient is stable for discharge.

## 2023-12-11 NOTE — ED PROVIDER NOTES
Encounter Date: 12/11/2023       History     Chief Complaint   Patient presents with    Fall     Patient reports trip and fall on Friday fell forward aught self with hands, left wrist pain, with swelling + ROM and +pulse. States did hit her head no LOC.      HPI      73-year-old female with a past medical history of hypertension, COPD, diabetes, osteoarthritis presenting to the emergency department today complaining of left wrist pain after a trip and fall.   Patient states she was walking when she tripped over a walker catching herself with her left hand.  Patient endorses immediate pain after fall.  Patient also endorses hitting her head on the ground without loss of consciousness and also denying nausea or vomiting.  Patient was not on anticoagulation.  Patient states she took an Excedrin last night with mild relief of pain.  Patient denies any fever, chest pain, shortness for breath, N/ V/ D, abdominal pain, urinary symptoms, extremity numbness / tingling.    Review of patient's allergies indicates:  No Known Allergies  Past Medical History:   Diagnosis Date    COPD (chronic obstructive pulmonary disease)     Diabetes mellitus     GERD (gastroesophageal reflux disease)     Hyperlipemia, mixed     Hypertension     Major depression     Obesity     Osteoarthritis     Thyroid disease     Tobacco abuse     Urinary incontinence, functional     Vitamin D deficiency      Past Surgical History:   Procedure Laterality Date    HYSTERECTOMY       No family history on file.  Social History     Tobacco Use    Smoking status: Every Day     Current packs/day: 1.00     Types: Cigarettes    Smokeless tobacco: Never   Substance Use Topics    Alcohol use: Yes     Review of Systems   Constitutional:  Negative for chills, diaphoresis, fatigue and fever.   HENT:  Negative for congestion, rhinorrhea and sore throat.    Eyes:  Negative for redness and visual disturbance.   Respiratory:  Negative for cough and shortness of breath.     Cardiovascular:  Negative for chest pain, palpitations and leg swelling.   Gastrointestinal:  Negative for abdominal pain, diarrhea, nausea and vomiting.   Genitourinary:  Negative for difficulty urinating, dysuria, flank pain and frequency.   Musculoskeletal:  Positive for arthralgias (left wrist) and joint swelling (left wrist). Negative for back pain, gait problem, myalgias, neck pain and neck stiffness.   Skin:  Negative for rash.   Neurological:  Negative for dizziness, syncope, weakness, light-headedness, numbness and headaches.   Hematological:  Does not bruise/bleed easily.       Physical Exam     Initial Vitals [12/11/23 1327]   BP Pulse Resp Temp SpO2   (!) 160/74 93 18 97.4 °F (36.3 °C) 97 %      MAP       --         Physical Exam    Nursing note and vitals reviewed.  Constitutional: She appears well-developed and well-nourished. She is not diaphoretic. No distress.   HENT:   Head: Normocephalic and atraumatic. Head is without raccoon's eyes, without Veliz's sign, without abrasion, without contusion and without laceration.   Right Ear: Tympanic membrane, external ear and ear canal normal.   Left Ear: Tympanic membrane, external ear and ear canal normal.   Nose: Nose normal. No rhinorrhea.   Mouth/Throat: Uvula is midline, oropharynx is clear and moist and mucous membranes are normal.   Eyes: Conjunctivae and EOM are normal. Pupils are equal, round, and reactive to light. Right eye exhibits no discharge. Left eye exhibits no discharge.   Neck:   Normal range of motion.   Full passive range of motion without pain.     Cardiovascular:  Normal rate, regular rhythm, normal heart sounds and normal pulses.           Pulmonary/Chest: Effort normal and breath sounds normal. No respiratory distress.   Abdominal: Abdomen is soft. Bowel sounds are normal. She exhibits no distension, no pulsatile midline mass and no mass. There is no abdominal tenderness.   No right CVA tenderness.  No left CVA tenderness. There is  no rebound and no guarding.   Musculoskeletal:      Right shoulder: Normal.      Left shoulder: Normal.      Right elbow: Normal.      Left elbow: Normal.      Right forearm: Normal.      Left forearm: Normal.      Right wrist: Normal.      Left wrist: Swelling, tenderness and bony tenderness present. No deformity, effusion, lacerations, snuff box tenderness or crepitus. Decreased range of motion (confounded by pain). Normal pulse.      Right hand: Normal.      Left hand: Swelling present. No deformity, tenderness or bony tenderness. Decreased range of motion (confounded by pain). Normal strength. Normal sensation. There is no disruption of two-point discrimination. Normal capillary refill. Normal pulse.      Cervical back: Normal, full passive range of motion without pain and normal range of motion.      Thoracic back: Normal.      Lumbar back: Normal.      Right hip: Normal.      Left hip: Normal.      Right knee: Normal.      Left knee: Normal.      Right ankle: Normal.      Left ankle: Normal.     Neurological: She is alert and oriented to person, place, and time. She has normal strength. No cranial nerve deficit or sensory deficit.   Skin: Skin is warm and dry. Capillary refill takes less than 2 seconds. No bruising, no ecchymosis and no rash noted. No pallor.   Psychiatric: She has a normal mood and affect. Her speech is normal and behavior is normal. Thought content normal.         ED Course   Procedures  Labs Reviewed - No data to display       Imaging Results              CT Head Without Contrast (Final result)  Result time 12/11/23 15:31:59      Final result by Noel Dubose MD (12/11/23 15:31:59)                   Impression:      No evidence of acute intracranial hemorrhage or other evidence of trauma.    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    Ventricles are mildly enlarged slightly more than on the prior study.      Electronically signed by: Noel Dubose  MD  Date:    12/11/2023  Time:    15:31               Narrative:    EXAMINATION:  CT HEAD WITHOUT CONTRAST    CLINICAL HISTORY:  Head trauma, minor (Age >= 65y);    TECHNIQUE:  Low dose axial images were obtained through the head.  Coronal and sagittal reformations were also performed. Contrast was not administered.    COMPARISON:  MRI 12/30/2022    FINDINGS:  Noncontrast head CT demonstrates remote infarct in the left lateral frontal perisylvian area and another smaller infarct in the left corona radiata, both similar to the prior MRI.  No acute infarct is seen.  No new areas of edema, mass effect, hemorrhage or infarction are observed.  No extra-axial collections are seen.    Ventricles are enlarged more than on the prior study which may relate to increasing volume loss.  Normal pressure hydrocephalus is another consideration.    Mild anterior ethmoid membrane thickening is seen.  No skull base bone destruction identified.                                       CT Cervical Spine Without Contrast (Final result)  Result time 12/11/23 15:38:55      Final result by Noel Dubose MD (12/11/23 15:38:55)                   Impression:      No acute fracture or subluxation.  Degenerative changes are seen, similar to the prior CT.      Electronically signed by: Noel Dubose MD  Date:    12/11/2023  Time:    15:38               Narrative:    EXAMINATION:  CT CERVICAL SPINE WITHOUT CONTRAST    CLINICAL HISTORY:  Neck trauma (Age >= 65y);    TECHNIQUE:  Low dose axial images, sagittal and coronal reformations were performed though the cervical spine.  Contrast was not administered.    COMPARISON:  06/17/2021    FINDINGS:  There is straightening of the set usual cervical lordosis.  Prominent anterior osteophytes are seen at C4 through C6.  No fracture is identified.  The odontoid is intact.  Facet joints are well aligned.  No obvious canal stenosis is appreciated.  No paraspinous soft tissue abnormality or prevertebral  soft tissue thickening is seen.  Carotid arteries have a medial tortuous retropharyngeal course.    Some ground-glass opacity is seen at the lung apices which is nonspecific and could represent edema or atelectasis.                                       X-Ray Wrist Complete Left (Final result)  Result time 12/11/23 13:57:19      Final result by Anastacio Almonte MD (12/11/23 13:57:19)                   Impression:      Possible fracture of the distal radius      Electronically signed by: Anastacio Almonte MD  Date:    12/11/2023  Time:    13:57               Narrative:    EXAMINATION:  XR WRIST COMPLETE 3 VIEWS LEFT    CLINICAL HISTORY:  Unspecified fall, initial encounter    TECHNIQUE:  PA, lateral, and oblique views of the left wrist were performed.    COMPARISON:  06/17/2021    FINDINGS:  There is minimal irregularity of the distal radius and I believe there is an undisplaced fracture of the distal radius.  Degenerative changes seen at the 1st carpal-metacarpal joint space.  There also degenerative changes at the 5th MP joint.                                       Medications   HYDROcodone-acetaminophen 5-325 mg per tablet 1 tablet (1 tablet Oral Given 12/11/23 1401)     Medical Decision Making    73-year-old female with a past medical history of hypertension, COPD, diabetes, osteoarthritis presenting to the emergency department today complaining of left wrist pain after a trip and fall.   Patient states she was walking when she tripped over a walker catching herself with her left hand.  Patient endorses immediate pain after fall.  Patient also endorses hitting her head on the ground without loss of consciousness and also denying nausea or vomiting.  Patient was not on anticoagulation.  Patient states she took an Excedrin last night with mild relief of pain.  Patient denies any fever, chest pain, shortness for breath, N/ V/ D, abdominal pain, urinary symptoms, extremity numbness / tingling.  Patient's chart  and medical history reviewed.  Patient's vitals reviewed.  They are afebrile, no respiratory distress, nontoxic-appearing in the ED.  Differential diagnosis is Septic Arthritis, Gout, Osteomyelitis, Fracture/Dislocation, Contusion/Sprain/Strain, SAH, SDH, EDH.  CT head/cervical spine ordered due to head injury in a 73 year old. CT without evidence of acute abnormalities. Xray results with evidence of distal radial fracture. Plan   Is made to place patient in a sugar-tong splint for stabilization will be referred out to follow up with Orthopedics in the next several days.  Patient will also follow up with her primary care physician in the next 3-5 days for re-evaluation.  Patient agrees with this plan does not have any questions for me at this time.  I discussed with the patient/family the diagnosis, treatment plan, indications for return to the emergency department, and for expected follow-up. The patient/family verbalized an understanding. The patient/family is asked if there are any questions or concerns. We discuss the case, until all issues are addressed to the patient/family's satisfaction. Patient/family understands and is agreeable to the plan.   TANNER SIERRA    DISCLAIMER: This note was prepared with [employer] voice recognition transcription software. Garbled syntax, mangled pronouns, and other bizarre constructions may be attributed to that software system.        Amount and/or Complexity of Data Reviewed  Radiology: ordered.    Risk  Prescription drug management.  Diagnosis or treatment significantly limited by social determinants of health.                                      Clinical Impression:  Final diagnoses:  [W19.XXXA] Fall  [M25.539] Wrist pain  [S52.502A] Closed fracture of distal end of left radius, unspecified fracture morphology, initial encounter (Primary)          ED Disposition Condition    Discharge Stable          ED Prescriptions       Medication Sig Dispense Start Date End Date Auth.  Provider    HYDROcodone-acetaminophen (NORCO) 5-325 mg per tablet Take 1 tablet by mouth every 6 (six) hours as needed for Pain. 18 tablet 12/11/2023 12/18/2023 Donavon Carlin PA-C    LIDOcaine (LIDODERM) 5 % Place 1 patch onto the skin once daily. Apply patch for 12 hours and then leave off for 12 hours 15 patch 12/11/2023 -- Donavon Carlin PA-C          Follow-up Information       Follow up With Specialties Details Why Contact Info    Ashlie Allison MD Internal Medicine Schedule an appointment as soon as possible for a visit in 3 days for follow up [address]  [facility] [address]  [phone]      Rodríguez Carranza Jr., MD Hand Surgery, Orthopedic Surgery Schedule an appointment as soon as possible for a visit  for orthopedic follow up, for follow up, As needed [address]  [address]  [facility] [id number] [phone]      Harman Jiang MD Orthopedic Surgery Schedule an appointment as soon as possible for a visit  for follow up, As needed [address]  [address]  [facility] [address]  [phone]               Donavon Carlin PA-C  12/11/23 3219

## 2023-12-12 ENCOUNTER — TELEPHONE (OUTPATIENT)
Dept: ORTHOPEDICS | Facility: CLINIC | Age: 73
End: 2023-12-12
Payer: MEDICARE

## 2023-12-12 NOTE — TELEPHONE ENCOUNTER
Spoke with pt.  Offered pt several appointments this week for left wrist pain.  Pt declined stating that she has to arrange transportation.  Scheduled as requested.

## (undated) DEVICE — NDL 18GA X1 1/2 REG BEVEL

## (undated) DEVICE — SEE MEDLINE ITEM 146271

## (undated) DEVICE — BLADE SAW OSC 25.4X90X1.27 ST

## (undated) DEVICE — GAUZE SPONGE 4X4 12PLY

## (undated) DEVICE — SUT VICRYL 2-0 36 CT-1

## (undated) DEVICE — COVER OVERHEAD SURG LT BLUE

## (undated) DEVICE — LINER GLOVE POWDERFREE 8

## (undated) DEVICE — NDL SPINAL 20GX3.5 HUB

## (undated) DEVICE — BLANKET UPPER BODY 78.7X29.9IN

## (undated) DEVICE — TRAY FOLEY 16FR INFECTION CONT

## (undated) DEVICE — PAD COLD THERAPY KNEE WRAP ON

## (undated) DEVICE — SOL IRR NACL .9% 3000ML

## (undated) DEVICE — SUT ETHIBOND EXCEL 1 CTX 18

## (undated) DEVICE — STOCKING KNEE HIGH MED REGULAR

## (undated) DEVICE — PADDING CAST SOFT-ROLL 6 X 4

## (undated) DEVICE — PIN THREADED STERILE
Type: IMPLANTABLE DEVICE | Site: KNEE | Status: NON-FUNCTIONAL
Removed: 2018-03-12

## (undated) DEVICE — SEE MEDLINE ITEM 154981

## (undated) DEVICE — SYR 50CC LL

## (undated) DEVICE — GLOVE SURGICAL LATEX SZ 8

## (undated) DEVICE — SYR ONLY LUER LOCK 20CC

## (undated) DEVICE — PULSAVAC ZIMMER

## (undated) DEVICE — SOL SOD CHLORIDE 0.9% 10ML

## (undated) DEVICE — KIT TOTAL HIP OPTIVAC

## (undated) DEVICE — Device

## (undated) DEVICE — DURAPREP SURG SCRUB 26ML

## (undated) DEVICE — LINER GLOVE POWDERFREE SZ 7.5

## (undated) DEVICE — TOURNIQUET SB QC DP 34X4IN

## (undated) DEVICE — GLOVE BIOGEL ORTHOPEDIC 7.5

## (undated) DEVICE — SUT VICRYL PLUS ANTIBACT

## (undated) DEVICE — PIN TEMPORARY
Type: IMPLANTABLE DEVICE | Site: KNEE | Status: NON-FUNCTIONAL
Removed: 2018-03-12

## (undated) DEVICE — ELECTRODE REM PLYHSV RETURN 9

## (undated) DEVICE — BLADE SAW OSCILLATING

## (undated) DEVICE — PUMP COLD THERAPY